# Patient Record
Sex: MALE | Race: WHITE | Employment: FULL TIME | URBAN - METROPOLITAN AREA
[De-identification: names, ages, dates, MRNs, and addresses within clinical notes are randomized per-mention and may not be internally consistent; named-entity substitution may affect disease eponyms.]

---

## 2017-03-12 DIAGNOSIS — E78.5 HYPERLIPIDEMIA LDL GOAL <130: ICD-10-CM

## 2017-03-14 RX ORDER — SIMVASTATIN 20 MG
TABLET ORAL
Qty: 90 TABLET | Refills: 0 | Status: SHIPPED | OUTPATIENT
Start: 2017-03-14 | End: 2017-06-11

## 2017-03-14 NOTE — TELEPHONE ENCOUNTER
Prescription approved per [facility] Refill Protocol.  Steph Ames RN

## 2017-03-14 NOTE — TELEPHONE ENCOUNTER
simvastatin (ZOCOR) 20 MG tablet       Last Written Prescription Date: 6/14/2016  Last Fill Quantity: 90, # refills: 2  Last Office Visit with G, P or [facility] prescribing provider: 4/29/2016       Lab Results   Component Value Date    CHOL 164 01/20/2014     Lab Results   Component Value Date    HDL 56 04/29/2016     Lab Results   Component Value Date    LDL 85 04/29/2016    LDL 93 01/20/2014     Lab Results   Component Value Date    TRIG 92 01/20/2014     Lab Results   Component Value Date    CHOLHDLRATIO 3.1 01/20/2014

## 2017-04-08 DIAGNOSIS — I10 HTN (HYPERTENSION): ICD-10-CM

## 2017-04-08 NOTE — LETTER
[facility]  [address]  [address]  [place]  [address]  Tel: [phone]    April 12, 2017    Primo MERVAT Willian  [address] [address]        Dear Mr. Dorsey,    Our records indicate that you are due for an office visit with .  Please call to schedule an appointment so that we can avoid interruption of your medical care and medication refills.  Your recent request for the medication, Losartan, has been filled for one month.  Further refills can be discussed at your next appointment.    Sincerely,    Steph PEREZ RN     [facility]/office of

## 2017-04-10 NOTE — TELEPHONE ENCOUNTER
losartan (COZAAR) 100 MG tablet        Last Written Prescription Date: 7/8/2016  Last Fill Quantity: 90, # refills: 2  Last Office Visit with G, P or [facility] prescribing provider: 4/29/2016       Potassium   Date Value Ref Range Status   04/29/2016 4.4 3.4 - 5.3 mmol/L Final     Creatinine   Date Value Ref Range Status   04/29/2016 0.89 0.66 - 1.25 mg/dL Final     BP Readings from Last 3 Encounters:   05/12/16 137/61   04/29/16 139/73   04/06/16 (!) 186/96

## 2017-04-12 RX ORDER — LOSARTAN POTASSIUM 100 MG/1
TABLET ORAL
Qty: 90 TABLET | Refills: 0 | Status: SHIPPED | OUTPATIENT
Start: 2017-04-12 | End: 2017-07-18

## 2017-04-12 NOTE — TELEPHONE ENCOUNTER
Left message on machine to call back to schedule.  Letter mailed to patient also.  Prescription approved per [facility] Refill Protocol X 1  Steph Ames RN

## 2017-06-11 DIAGNOSIS — E78.5 HYPERLIPIDEMIA LDL GOAL <130: ICD-10-CM

## 2017-06-12 NOTE — TELEPHONE ENCOUNTER
TC's Patient needs to est care with new PCP. Route to refill once complete      simvastatin (ZOCOR) 20 MG tablet       Last Written Prescription Date: 3/14/2017  Last Fill Quantity: 90, # refills: 0  Last Office Visit with [facility], [facility] or [facility] prescribing provider: 4/29/2016       Lab Results   Component Value Date    CHOL 164 01/20/2014     Lab Results   Component Value Date    HDL 56 04/29/2016     Lab Results   Component Value Date    LDL 85 04/29/2016    LDL 93 01/20/2014     Lab Results   Component Value Date    TRIG 92 01/20/2014     Lab Results   Component Value Date    CHOLHDLRATIO 3.1 01/20/2014

## 2017-06-14 RX ORDER — SIMVASTATIN 20 MG
TABLET ORAL
Qty: 90 TABLET | Refills: 0 | Status: SHIPPED | OUTPATIENT
Start: 2017-06-14 | End: 2018-01-16

## 2017-06-14 NOTE — TELEPHONE ENCOUNTER
Routing refill request to provider for review/approval because:  Labs not current:  FLP    PCP: Patient has appointment to establish care with Dr Maldonado.    Please review refill request and approve as appropriate    Thank you    Jesi Belle RN

## 2017-07-18 ENCOUNTER — OFFICE VISIT (OUTPATIENT)
Dept: FAMILY MEDICINE | Facility: CLINIC | Age: 77
End: 2017-07-18
Payer: COMMERCIAL

## 2017-07-18 VITALS
DIASTOLIC BLOOD PRESSURE: 70 MMHG | BODY MASS INDEX: 18.26 KG/M2 | HEART RATE: 67 BPM | WEIGHT: 134.8 LBS | TEMPERATURE: 97.6 F | HEIGHT: 72 IN | SYSTOLIC BLOOD PRESSURE: 136 MMHG | OXYGEN SATURATION: 98 %

## 2017-07-18 DIAGNOSIS — I10 ESSENTIAL HYPERTENSION: Primary | ICD-10-CM

## 2017-07-18 DIAGNOSIS — F17.200 TOBACCO USE DISORDER: ICD-10-CM

## 2017-07-18 DIAGNOSIS — E78.5 HYPERLIPIDEMIA LDL GOAL <130: ICD-10-CM

## 2017-07-18 DIAGNOSIS — M47.892 OTHER OSTEOARTHRITIS OF SPINE, CERVICAL REGION: ICD-10-CM

## 2017-07-18 DIAGNOSIS — J43.8 OTHER EMPHYSEMA (H): ICD-10-CM

## 2017-07-18 DIAGNOSIS — J30.2 CHRONIC SEASONAL ALLERGIC RHINITIS, UNSPECIFIED TRIGGER: ICD-10-CM

## 2017-07-18 PROCEDURE — 99214 OFFICE O/P EST MOD 30 MIN: CPT | Performed by: INTERNAL MEDICINE

## 2017-07-18 RX ORDER — SIMVASTATIN 20 MG
20 TABLET ORAL AT BEDTIME
Qty: 90 TABLET | Refills: 3 | Status: SHIPPED | OUTPATIENT
Start: 2017-07-18 | End: 2018-01-01

## 2017-07-18 RX ORDER — LOSARTAN POTASSIUM 100 MG/1
TABLET ORAL
Qty: 90 TABLET | Refills: 3 | Status: SHIPPED | OUTPATIENT
Start: 2017-07-18 | End: 2018-01-01

## 2017-07-18 RX ORDER — FLUTICASONE PROPIONATE 50 MCG
1-2 SPRAY, SUSPENSION (ML) NASAL DAILY
Qty: 16 G | Refills: 1 | COMMUNITY

## 2017-07-18 NOTE — MR AVS SNAPSHOT
After Visit Summary   7/18/2017    Primo Dorsey    MRN: [id number]           Patient Information     Date Of Birth          1940        Visit Information        Provider Department      7/18/2017 8:30 AM Ralph Maldonado MD [facility]        Today's Diagnoses     Essential hypertension    -  1    Other emphysema (H)        Hyperlipidemia LDL goal <130        Tobacco use disorder        Other osteoarthritis of spine, cervical region        Chronic seasonal allergic rhinitis, unspecified trigger           Follow-ups after your visit        Your next 10 appointments already scheduled     Aug 21, 2017  8:30 AM CDT   Office Visit with [facility]donado MD   [facility]    [address] [facility] [address] [phone]           Bring a current list of meds and any records pertaining to this visit.  For Physicals, please bring immunization records and any forms needing to be filled out.  Please arrive 10 minutes early to complete paperwork.              Who to contact     If you have questions or need follow up information about today's clinic visit or your schedule please contact [facility] directly at [phone].  Normal or non-critical lab and imaging results will be communicated to you by Front[employer], letter or phone within 4 business days after the clinic has received the results. If you do not hear from us within 7 days, please contact the clinic through [employer]en[employer] or phone. If you have a critical or abnormal lab result, we will notify you by phone as soon as possible.  Submit refill requests through [employer] or call your pharmacy and they will forward the refill request to us. Please allow 3 business days for your refill to be completed.          Additional Information About Your Visit        Frontenach[employer]oddler Information     [employer] lets you send messages to your doctor, view your test results, renew your prescriptions, schedule  "appointments and more. To sign up, go to [email or web address] . Click on \"Log in\" on the left side of the screen, which will take you to the Welcome page. Then click on \"Sign up Now\" on the right side of the page.     You will be asked to enter the access code listed below, as well as some personal information. Please follow the directions to create your username and password.     Your access code is: [email or web address]  Expires: 10/17/2017  6:01 AM     Your access code will  in 90 days. If you need help or a new code, please call your [facility] clinic or [phone].        Care EveryWhere ID     This is your Care EveryWhere ID. This could be used by other organizations to access your [facility] medical records  [id number]        Your Vitals Were     Pulse Temperature Height Pulse Oximetry BMI (Body Mass Index)       67 97.6  F (36.4  C) (Oral) 6' (1.829 m) 98% 18.28 kg/m2        Blood Pressure from Last 3 Encounters:   17 136/70   16 137/61   16 139/73    Weight from Last 3 Encounters:   17 134 lb 12.8 oz (61.1 kg)   16 136 lb 8 oz (61.9 kg)   16 139 lb 1 oz (63.1 kg)              Today, you had the following     No orders found for display         Today's Medication Changes          These changes are accurate as of: 17 11:59 PM.  If you have any questions, ask your nurse or doctor.               These medicines have changed or have updated prescriptions.        Dose/Directions    fluticasone-salmeterol 250-50 MCG/DOSE diskus inhaler   Commonly known as:  ADVAIR   This may have changed:  when to take this   Used for:  Other emphysema (H)   Changed by:  Ralph Maldonado MD        Dose:  1 puff   Inhale 1 puff into the lungs daily   Quantity:  1 Inhaler   Refills:  3       losartan 100 MG tablet   Commonly known as:  COZAAR   This may have changed:  See the new instructions.   Used for:  Essential hypertension   Changed by:  Ralph Maldonado MD        take 1 tablet by " mouth once daily.   Quantity:  90 tablet   Refills:  3       * simvastatin 20 MG tablet   Commonly known as:  ZOCOR   This may have changed:  Another medication with the same name was added. Make sure you understand how and when to take each.   Used for:  Hyperlipidemia LDL goal <130   Changed by:  Anton Brown MD        TAKE 1 TABLET BY MOUTH EVERY NIGHT AT BEDTIME.   Quantity:  90 tablet   Refills:  0       * simvastatin 20 MG tablet   Commonly known as:  ZOCOR   This may have changed:  You were already taking a medication with the same name, and this prescription was added. Make sure you understand how and when to take each.   Used for:  Hyperlipidemia LDL goal <130   Changed by:  Ralph Maldonado MD        Dose:  20 mg   Take 1 tablet (20 mg) by mouth At Bedtime   Quantity:  90 tablet   Refills:  3       * Notice:  This list has 2 medication(s) that are the same as other medications prescribed for you. Read the directions carefully, and ask your doctor or other care provider to review them with you.      Stop taking these medicines if you haven't already. Please contact your care team if you have questions.     ibuprofen 600 MG tablet   Commonly known as:  ADVIL/MOTRIN   Stopped by:  Ralph Maldonado MD           MULTI-DAY Tabs   Stopped by:  Ralph Maldonado MD           oxyCODONE-acetaminophen 5-325 MG per tablet   Commonly known as:  PERCOCET   Stopped by:  Ralph Maldonado MD           senna-docusate 8.6-50 MG per tablet   Commonly known as:  SENOKOT-S;PERICOLACE   Stopped by:  Ralph Maldonado MD                Where to get your medicines      These medications were sent to [facility][id number] - [place], [place] - [address]   [address] [facility] [address]     Phone:  [phone]     losartan 100 MG tablet    simvastatin 20 MG tablet                Primary Care Provider Office Phone # Fax #    Ralph Maldonado [phone]       [facility]  [address]  [facility] [address]        Equal Access to Services     JACKSON CARRILLO : Hadii michelle ku hadconyo Sosharonali, waaxda luqadaha, qaybta kaalmada malickvanessa, jose monreal carlos albertojack teresalucia urena herbie couch. So [facility] [phone].    ATENCIÓN: Si habla español, tiene a dee disposición servicios gratuitos de asistencia lingüística. Llame al [phone].    We comply with applicable federal civil rights laws and [place] laws. We do not discriminate on the basis of race, color, national origin, age, disability sex, sexual orientation or gender identity.            Thank you!     Thank you for choosing [facility]  for your care. Our goal is always to provide you with excellent care. Hearing back from our patients is one way we can continue to improve our services. Please take a few minutes to complete the written survey that you may receive in the mail after your visit with us. Thank you!             Your Updated Medication List - Protect others around you: Learn how to safely use, store and throw away your medicines at www.disposemymeds.org.          This list is accurate as of: 7/18/17 11:59 PM.  Always use your most recent med list.                   Brand Name Dispense Instructions for use Diagnosis    aspirin 81 MG tablet      1 tab po QD (Once per day)        FLONASE 50 MCG/ACT spray   Generic drug:  fluticasone     16 g    Spray 1-2 sprays into both nostrils daily        fluticasone-salmeterol 250-50 MCG/DOSE diskus inhaler    ADVAIR    1 Inhaler    Inhale 1 puff into the lungs daily    Other emphysema (H)       levalbuterol 45 MCG/ACT Inhaler    XOPENEX HFA    1 Inhaler    Inhale 2 puffs into the lungs every 6 hours as needed for shortness of breath / dyspnea or wheezing    COPD exacerbation (H)       losartan 100 MG tablet    COZAAR    90 tablet    take 1 tablet by mouth once daily.    Essential hypertension       * simvastatin 20 MG tablet    ZOCOR    90 tablet    TAKE 1 TABLET BY MOUTH  EVERY NIGHT AT BEDTIME.    Hyperlipidemia LDL goal <130       * simvastatin 20 MG tablet    ZOCOR    90 tablet    Take 1 tablet (20 mg) by mouth At Bedtime    Hyperlipidemia LDL goal <130       * Notice:  This list has 2 medication(s) that are the same as other medications prescribed for you. Read the directions carefully, and ask your doctor or other care provider to review them with you.

## 2017-07-18 NOTE — NURSING NOTE
Chief Complaint   Patient presents with     Hypertension     Lipids       Initial /77 (BP Location: Right arm, Patient Position: Chair, Cuff Size: Adult Regular)  Pulse 67  Temp 97.6  F (36.4  C) (Oral)  Ht 6' (1.829 m)  Wt 134 lb 12.8 oz (61.1 kg)  SpO2 98%  BMI 18.28 kg/m2 Estimated body mass index is 18.28 kg/(m^2) as calculated from the following:    Height as of this encounter: 6' (1.829 m).    Weight as of this encounter: 134 lb 12.8 oz (61.1 kg).  Medication Reconciliation: complete.  Isabel Blancas, CMA

## 2017-07-18 NOTE — PROGRESS NOTES
SUBJECTIVE:                                                    Prmio Dorsey is a 77 year old male who was a previous Dr. Brown patient, presents to clinic today for the following health issues:    He notes hearing loss for which he has hearing aids though he is reluctant to wear them due to aggravation of congestion.    Patient has emphysema and has been using Advair 1 puff once a day. He notes he recently started one month ago and has noticed improvement of breathing. Denies wheezing. Patient is able to walk at least 1.5 blocks without exertional dyspnea. He does not walk routinely though he does walk at work at a [employer]. Patient notes he has not used rescue inhaler in 4-6 weeks.    Patient has seasonal allergic rhinitis and notes he does not use fluticasone though he uses a nasal sprayer, Clear, a homeopathic nasal spray and a saline solution at night. He opts to keep Flonase on hand for increased symptoms.     Hyperlipidemia Follow-Up      Rate your low fat/cholesterol diet?: not monitoring fat    Taking statin?  Yes, no muscle aches from statin. Tolerated well.    Other lipid medications/supplements?:  none    Hypertension Follow-up      Outpatient blood pressures are not being checked.    Low Salt Diet: watches sodium      Amount of exercise or physical activity: still works, on his feet a lot    Problems taking medications regularly: No    Medication side effects: none    Diet: low salt    Problem list and histories reviewed & adjusted, as indicated.  Additional history: as documented    Current Outpatient Prescriptions   Medication Sig Dispense Refill     fluticasone-salmeterol (ADVAIR) 250-50 MCG/DOSE diskus inhaler Inhale 1 puff into the lungs daily 1 Inhaler 3     fluticasone (FLONASE) 50 MCG/ACT spray Spray 1-2 sprays into both nostrils daily 16 g 1     losartan (COZAAR) 100 MG tablet take 1 tablet by mouth once daily. 90 tablet 3     simvastatin (ZOCOR) 20 MG tablet Take 1 tablet (20  mg) by mouth At Bedtime 90 tablet 3     simvastatin (ZOCOR) 20 MG tablet TAKE 1 TABLET BY MOUTH EVERY NIGHT AT BEDTIME. 90 tablet 0     levalbuterol (XOPENEX HFA) 45 MCG/ACT inhaler Inhale 2 puffs into the lungs every 6 hours as needed for shortness of breath / dyspnea or wheezing 1 Inhaler 3     ASPIRIN 81 MG OR TABS 1 tab po QD (Once per day)       [DISCONTINUED] losartan (COZAAR) 100 MG tablet take 1 tablet by mouth once daily. 90 tablet 0     [DISCONTINUED] fluticasone-salmeterol (ADVAIR) 250-50 MCG/DOSE diskus inhaler Inhale 1 puff into the lungs 2 times daily 1 Inhaler 3     Allergies   Allergen Reactions     No Known Drug Allergies      Reviewed and updated as needed this visit by clinical staff  Tobacco  Meds  Soc Hx      Reviewed and updated as needed this visit by Provider       ROS:  Constitutional, HEENT, cardiovascular, pulmonary, gi and gu systems are negative, except as otherwise noted.    This document serves as a record of the services and decisions personally performed and made by Ralph Maldonado MD. It was created on his/her behalf by Breana Morgan, a trained medical scribe. The creation of this document is based the provider's statements to the medical scribe.  Scribfatou Morgan 8:46 AM, July 18, 2017     OBJECTIVE:   /70  Pulse 67  Temp 97.6  F (36.4  C) (Oral)  Ht 1.829 m (6')  Wt 61.1 kg (134 lb 12.8 oz)  SpO2 98%  BMI 18.28 kg/m2  Body mass index is 18.28 kg/(m^2).  Neck was supple without adenopathy or thyromegaly his carotids were normal without bruits  Chest clear to auscultation and percussion. Decreased breath sounds bilaterally  Cardiovascular S1 and S2 are physiologic without murmurs or gallops  Abdomen bowel sounds were normal.  There is no palpable mass or organomegaly  Extremities nontender without any edema  Skin without significant abnormality     ASSESSMENT/PLAN:   1. Essential hypertension  - losartan (COZAAR) 100 MG tablet; take 1 tablet by mouth once  daily.  Dispense: 90 tablet; Refill: 3    2. Other emphysema (H)  Patient has previously used Advair QD. Begin Advair BID.  - fluticasone-salmeterol (ADVAIR) 250-50 MCG/DOSE diskus inhaler; Inhale 1 puff into the lungs daily  Dispense: 1 Inhaler; Refill: 3    3. Hyperlipidemia LDL goal <130  - simvastatin (ZOCOR) 20 MG tablet; Take 1 tablet (20 mg) by mouth At Bedtime  Dispense: 90 tablet; Refill: 3    4. Tobacco use disorder    5. Other osteoarthritis of spine, cervical region    6.Chronic Seasonal allergic rhinitis,unspecified trigger  - fluticasone (FLONASE) 50 MCG/ACT spray; Spray 1-2 sprays into both nostrils daily  Dispense: 16 g; Refill: 1    Except otherwise noted as above, all conditions are stable and medications are tolerated well. Continue related medications unchanged.    Patient was instructed to monitor his activity by using a pedometer.     Follow up for breathing, annual physical and associated labs in one month.    Ralph Maldonado MD  [facility]    The information in this document, created by the medical scribe for me, accurately reflects the services I personally performed and the decisions made by me. I have reviewed and approved this document for accuracy prior to leaving the patient care area.  Ralph Maldonado MD  8:35 AM, 07/18/17

## 2017-07-24 DIAGNOSIS — J43.8 OTHER EMPHYSEMA (H): ICD-10-CM

## 2017-07-25 NOTE — TELEPHONE ENCOUNTER
fluticasone-salmeterol (ADVAIR) 250-50 MCG/DOSE diskus inhaler         Medication listed as historical    Last Written Prescription Date: -  Last Fill Quantity: 1, # refills: 3    Last Office Visit with G, P or [facility] prescribing provider:  7/18/17   Future Office Visit:    Next 5 appointments (look out 90 days)     Aug 21, 2017  8:30 AM CDT   [facility] Visit with Ralph Maldonado MD   [facility]    [address] [address]   [phone]                   Date of Last Asthma Action Plan Letter:   There are no preventive care reminders to display for this patient.   Asthma Control Test: No flowsheet data found.    Date of Last Spirometry Test:   No results found for this or any previous visit.          Cathi Avalos RT(R)

## 2017-07-26 NOTE — TELEPHONE ENCOUNTER
Routing refill request to provider for review/approval because:  1) Rx listed as historical  2) No spirometry test in patient chart  3) Per office visit notes 7/18/17 - patient to increase Advair to BID (was taking QD)     pended for BID dosing.   Yareli HOLLOWAY RN

## 2017-08-18 NOTE — PROGRESS NOTES
"  SUBJECTIVE:   Primo Dorsey is a 77 year old male who presents to clinic today for the following health issues:  {Provider please address medication reconciliation discrepancies--rooming staff please delete if no med/rec issues}    {Superlists:587224}    {additional problems for provider to add:117326}    Problem list and histories reviewed & adjusted, as indicated.  Additional history: {NONE - AS DOCUMENTED:199945::\"as documented\"}    {HIST REVIEW/ LINKS 2:319362}    Reviewed and updated as needed this visit by clinical staff       Reviewed and updated as needed this visit by Provider         {PROVIDER CHARTING PREFERENCE:808714}    "

## 2017-08-21 ENCOUNTER — OFFICE VISIT (OUTPATIENT)
Dept: FAMILY MEDICINE | Facility: CLINIC | Age: 77
End: 2017-08-21
Payer: COMMERCIAL

## 2017-08-21 VITALS
OXYGEN SATURATION: 97 % | HEIGHT: 72 IN | HEART RATE: 69 BPM | SYSTOLIC BLOOD PRESSURE: 155 MMHG | BODY MASS INDEX: 18.28 KG/M2 | WEIGHT: 135 LBS | DIASTOLIC BLOOD PRESSURE: 75 MMHG | TEMPERATURE: 97.9 F

## 2017-08-21 DIAGNOSIS — M47.892 OTHER OSTEOARTHRITIS OF SPINE, CERVICAL REGION: ICD-10-CM

## 2017-08-21 DIAGNOSIS — Z00.00 ENCOUNTER FOR ROUTINE ADULT HEALTH EXAMINATION WITHOUT ABNORMAL FINDINGS: ICD-10-CM

## 2017-08-21 DIAGNOSIS — Z12.5 SCREENING FOR PROSTATE CANCER: ICD-10-CM

## 2017-08-21 DIAGNOSIS — E78.5 HYPERLIPIDEMIA LDL GOAL <130: ICD-10-CM

## 2017-08-21 DIAGNOSIS — G62.89 OTHER POLYNEUROPATHY: ICD-10-CM

## 2017-08-21 DIAGNOSIS — F17.200 TOBACCO USE DISORDER: ICD-10-CM

## 2017-08-21 DIAGNOSIS — I10 ESSENTIAL HYPERTENSION: Primary | ICD-10-CM

## 2017-08-21 DIAGNOSIS — R53.83 FATIGUE, UNSPECIFIED TYPE: ICD-10-CM

## 2017-08-21 DIAGNOSIS — R73.9 HYPERGLYCEMIA: ICD-10-CM

## 2017-08-21 DIAGNOSIS — J43.8 OTHER EMPHYSEMA (H): ICD-10-CM

## 2017-08-21 LAB
ALBUMIN SERPL-MCNC: 4.3 G/DL (ref 3.4–5)
ALBUMIN UR-MCNC: NEGATIVE MG/DL
ALP SERPL-CCNC: 71 U/L (ref 40–150)
ALT SERPL W P-5'-P-CCNC: 27 U/L (ref 0–70)
ANION GAP SERPL CALCULATED.3IONS-SCNC: 8 MMOL/L (ref 3–14)
APPEARANCE UR: CLEAR
AST SERPL W P-5'-P-CCNC: 39 U/L (ref 0–45)
BILIRUB SERPL-MCNC: 0.6 MG/DL (ref 0.2–1.3)
BILIRUB UR QL STRIP: NEGATIVE
BUN SERPL-MCNC: 21 MG/DL (ref 7–30)
CALCIUM SERPL-MCNC: 9.2 MG/DL (ref 8.5–10.1)
CHLORIDE SERPL-SCNC: 106 MMOL/L (ref 94–109)
CHOLEST SERPL-MCNC: 167 MG/DL
CO2 SERPL-SCNC: 26 MMOL/L (ref 20–32)
COLOR UR AUTO: YELLOW
CREAT SERPL-MCNC: 0.86 MG/DL (ref 0.66–1.25)
ERYTHROCYTE [DISTWIDTH] IN BLOOD BY AUTOMATED COUNT: 13.6 % (ref 10–15)
FEF 25/75: NORMAL
FEV-1: NORMAL
FEV1/FVC: NORMAL
FVC: NORMAL
GFR SERPL CREATININE-BSD FRML MDRD: 87 ML/MIN/1.7M2
GLUCOSE SERPL-MCNC: 87 MG/DL (ref 70–99)
GLUCOSE UR STRIP-MCNC: NEGATIVE MG/DL
HBA1C MFR BLD: 5.5 % (ref 4.3–6)
HCT VFR BLD AUTO: 42.5 % (ref 40–53)
HDLC SERPL-MCNC: 74 MG/DL
HGB BLD-MCNC: 13.9 G/DL (ref 13.3–17.7)
HGB UR QL STRIP: ABNORMAL
KETONES UR STRIP-MCNC: NEGATIVE MG/DL
LDLC SERPL CALC-MCNC: 82 MG/DL
LEUKOCYTE ESTERASE UR QL STRIP: NEGATIVE
MCH RBC QN AUTO: 30.5 PG (ref 26.5–33)
MCHC RBC AUTO-ENTMCNC: 32.7 G/DL (ref 31.5–36.5)
MCV RBC AUTO: 93 FL (ref 78–100)
NITRATE UR QL: NEGATIVE
NONHDLC SERPL-MCNC: 93 MG/DL
PH UR STRIP: 7 PH (ref 5–7)
PLATELET # BLD AUTO: 212 10E9/L (ref 150–450)
POTASSIUM SERPL-SCNC: 4.2 MMOL/L (ref 3.4–5.3)
PROT SERPL-MCNC: 7.9 G/DL (ref 6.8–8.8)
PSA SERPL-ACNC: 1.75 UG/L (ref 0–4)
RBC # BLD AUTO: 4.56 10E12/L (ref 4.4–5.9)
RBC #/AREA URNS AUTO: NORMAL /HPF
SODIUM SERPL-SCNC: 140 MMOL/L (ref 133–144)
SOURCE: ABNORMAL
SP GR UR STRIP: 1.01 (ref 1–1.03)
TRIGL SERPL-MCNC: 53 MG/DL
TSH SERPL DL<=0.005 MIU/L-ACNC: 1.6 MU/L (ref 0.4–4)
UROBILINOGEN UR STRIP-ACNC: 0.2 EU/DL (ref 0.2–1)
VIT B12 SERPL-MCNC: 545 PG/ML (ref 193–986)
WBC # BLD AUTO: 6.5 10E9/L (ref 4–11)
WBC #/AREA URNS AUTO: NORMAL /HPF

## 2017-08-21 PROCEDURE — 36415 COLL VENOUS BLD VENIPUNCTURE: CPT | Performed by: INTERNAL MEDICINE

## 2017-08-21 PROCEDURE — 85027 COMPLETE CBC AUTOMATED: CPT | Performed by: INTERNAL MEDICINE

## 2017-08-21 PROCEDURE — 81001 URINALYSIS AUTO W/SCOPE: CPT | Performed by: INTERNAL MEDICINE

## 2017-08-21 PROCEDURE — 94010 BREATHING CAPACITY TEST: CPT | Performed by: INTERNAL MEDICINE

## 2017-08-21 PROCEDURE — G0103 PSA SCREENING: HCPCS | Performed by: INTERNAL MEDICINE

## 2017-08-21 PROCEDURE — G0439 PPPS, SUBSEQ VISIT: HCPCS | Performed by: INTERNAL MEDICINE

## 2017-08-21 PROCEDURE — 82607 VITAMIN B-12: CPT | Performed by: INTERNAL MEDICINE

## 2017-08-21 PROCEDURE — 84443 ASSAY THYROID STIM HORMONE: CPT | Performed by: INTERNAL MEDICINE

## 2017-08-21 PROCEDURE — 80053 COMPREHEN METABOLIC PANEL: CPT | Performed by: INTERNAL MEDICINE

## 2017-08-21 PROCEDURE — 80061 LIPID PANEL: CPT | Performed by: INTERNAL MEDICINE

## 2017-08-21 PROCEDURE — 83036 HEMOGLOBIN GLYCOSYLATED A1C: CPT | Performed by: INTERNAL MEDICINE

## 2017-08-21 NOTE — MR AVS SNAPSHOT
After Visit Summary   8/21/2017    Primo Dorsey    MRN: [id number]           Patient Information     Date Of Birth          1940        Visit Information        Provider Department      8/21/2017 8:30 AM Ralph Maldonado MD [facility]        Today's Diagnoses     Essential hypertension    -  1    Hyperlipidemia LDL goal <130        Other polyneuropathy (H)        Other emphysema (H)        Tobacco use disorder        Other osteoarthritis of spine, cervical region        Encounter for routine adult health examination without abnormal findings        Screening for prostate cancer        Fatigue, unspecified type        Hyperglycemia          Care Instructions      Preventive Health Recommendations:       Male Ages 65 and over    Yearly exam:             See your health care provider every year in order to  o   Review health changes.   o   Discuss preventive care.    o   Review your medicines if your doctor has prescribed any.    Talk with your health care provider about whether you should have a test to screen for prostate cancer (PSA).    Every 3 years, have a diabetes test (fasting glucose). If you are at risk for diabetes, you should have this test more often.    Every 5 years, have a cholesterol test. Have this test more often if you are at risk for high cholesterol or heart disease.     Every 10 years, have a colonoscopy. Or, have a yearly FIT test (stool test). These exams will check for colon cancer.    Talk to with your health care provider about screening for Abdominal Aortic Aneurysm if you have a family history of AAA or have a history of smoking.  Shots:     Get a flu shot each year.     Get a tetanus shot every 10 years.     Talk to your doctor about your pneumonia vaccines. There are now two you should receive - Pneumovax (PPSV 23) and Prevnar (PCV 13).    Talk to your doctor about a shingles vaccine.     Talk to your doctor about the hepatitis B vaccine.  Nutrition:  "    Eat at least 5 servings of fruits and vegetables each day.     Eat whole-grain bread, whole-wheat pasta and brown rice instead of white grains and rice.     Talk to your doctor about Calcium and Vitamin D.   Lifestyle    Exercise for at least 150 minutes a week (30 minutes a day, 5 days a week). This will help you control your weight and prevent disease.     Limit alcohol to one drink per day.     No smoking.     Wear sunscreen to prevent skin cancer.     See your dentist every six months for an exam and cleaning.     See your eye doctor every 1 to 2 years to screen for conditions such as glaucoma, macular degeneration and cataracts.          Follow-ups after your visit        Who to contact     If you have questions or need follow up information about today's clinic visit or your schedule please contact [facility] directly at [phone].  Normal or non-critical lab and imaging results will be communicated to you by RunRevhart, letter or phone within 4 business days after the clinic has received the results. If you do not hear from us within 7 days, please contact the clinic through RunRevhart or phone. If you have a critical or abnormal lab result, we will notify you by phone as soon as possible.  Submit refill requests through Contraqer or call your pharmacy and they will forward the refill request to us. Please allow 3 business days for your refill to be completed.          Additional Information About Your Visit        Contraqer Information     Contraqer lets you send messages to your doctor, view your test results, renew your prescriptions, schedule appointments and more. To sign up, go to [email or web address] . Click on \"Log in\" on the left side of the screen, which will take you to the Welcome page. Then click on \"Sign up Now\" on the right side of the page.     You will be asked to enter the access code listed below, as well as some personal information. Please follow the directions to create your " username and password.     Your access code is: [email or web address]  Expires: 10/17/2017  6:01 AM     Your access code will  in 90 days. If you need help or a new code, please call your [facility] clinic or [phone].        Care EveryWhere ID     This is your Care EveryWhere ID. This could be used by other organizations to access your [facility] medical records  [id number]        Your Vitals Were     Pulse Temperature Height Pulse Oximetry BMI (Body Mass Index)       69 97.9  F (36.6  C) (Oral) 6' (1.829 m) 97% 18.31 kg/m2        Blood Pressure from Last 3 Encounters:   17 155/75   17 136/70   16 137/61    Weight from Last 3 Encounters:   17 135 lb (61.2 kg)   17 134 lb 12.8 oz (61.1 kg)   16 136 lb 8 oz (61.9 kg)              We Performed the Following     CBC with platelets     Comprehensive metabolic panel     Hemoglobin A1c     Lipid panel reflex to direct LDL     Prostate spec antigen screen     Spirometry, Breathing Capacity: Normal Order, Clinic Performed     TSH     UA reflex to Microscopic and Culture     Urine Microscopic     Vitamin B12          Today's Medication Changes          These changes are accurate as of: 17 11:59 PM.  If you have any questions, ask your nurse or doctor.               Start taking these medicines.        Dose/Directions    order for DME   Used for:  Other emphysema (H)   Started by:  Ralph Maldonado MD        Dog walking three times weekly   Quantity:  1 unit marking on an U-100 insulin syringe   Refills:  11            Where to get your medicines      Some of these will need a paper prescription and others can be bought over the counter.  Ask your nurse if you have questions.     Bring a paper prescription for each of these medications     order for DME                Primary Care Provider Office Phone # Fax #    Ralph Maldonado [phone] [address] [facility] [address]  [facility] [address]        Equal Access to  Services     [facility]: Hadii aad ku hadconybennett Sanadov, waaxda luqadaha, qaybta kaalmada erica, jose stoner . So [facility] [phone].    ATENCIÓN: Si habla dexter, tiene a dee disposición servicios gratuitos de asistencia lingüística. Llame al [phone].    We comply with applicable federal civil rights laws and [place] laws. We do not discriminate on the basis of race, color, national origin, age, disability sex, sexual orientation or gender identity.            Thank you!     Thank you for choosing [facility]  for your care. Our goal is always to provide you with excellent care. Hearing back from our patients is one way we can continue to improve our services. Please take a few minutes to complete the written survey that you may receive in the mail after your visit with us. Thank you!             Your Updated Medication List - Protect others around you: Learn how to safely use, store and throw away your medicines at www.disposemymeds.org.          This list is accurate as of: 8/21/17 11:59 PM.  Always use your most recent med list.                   Brand Name Dispense Instructions for use Diagnosis    aspirin 81 MG tablet      1 tab po QD (Once per day)        FLONASE 50 MCG/ACT spray   Generic drug:  fluticasone     16 g    Spray 1-2 sprays into both nostrils daily        fluticasone-salmeterol 250-50 MCG/DOSE diskus inhaler    ADVAIR    3 Inhaler    Inhale 1 puff into the lungs 2 times daily    Other emphysema (H)       levalbuterol 45 MCG/ACT Inhaler    XOPENEX HFA    1 Inhaler    Inhale 2 puffs into the lungs every 6 hours as needed for shortness of breath / dyspnea or wheezing    COPD exacerbation (H)       losartan 100 MG tablet    COZAAR    90 tablet    take 1 tablet by mouth once daily.    Essential hypertension       order for DME     1 unit marking on an U-100 insulin syringe    Dog walking three times weekly    Other emphysema (H)       PRO-BIOTIC  BLEND PO           * simvastatin 20 MG tablet    ZOCOR    90 tablet    TAKE 1 TABLET BY MOUTH EVERY NIGHT AT BEDTIME.    Hyperlipidemia LDL goal <130       * simvastatin 20 MG tablet    ZOCOR    90 tablet    Take 1 tablet (20 mg) by mouth At Bedtime    Hyperlipidemia LDL goal <130       * Notice:  This list has 2 medication(s) that are the same as other medications prescribed for you. Read the directions carefully, and ask your doctor or other care provider to review them with you.

## 2017-08-21 NOTE — PROGRESS NOTES
SUBJECTIVE:   Primo Dorsey is a 77 year old male who presents for Preventive Visit.    155/75  Are you in the first 12 months of your Medicare Part B coverage?  No    Healthy Habits:    Do you get at least three servings of calcium containing foods daily (dairy, green leafy vegetables, etc.)? yes    Amount of exercise or daily activities, outside of work: 5 day(s) per week    Problems taking medications regularly No    Medication side effects: No    Have you had an eye exam in the past two years? yes    Do you see a dentist twice per year? yes    Do you have sleep apnea, excessive snoring or daytime drowsiness?no    COGNITIVE SCREEN  1) Repeat 3 items (Banana, Sunrise, Chair)    2) Clock draw:   3) 3 item recall:   Results: 3 items recalled: COGNITIVE IMPAIRMENT LESS LIKELY    Mini-CogTM Copyright S Iva. Licensed by the author for use in [facility]; reprinted with permission (eligio@[facility]). All rights reserved.              {additional problems to add (Optional):084287}    Reviewed and updated as needed this visit by clinical staff         Reviewed and updated as needed this visit by Provider      Social History   Substance Use Topics     Smoking status: Current Every Day Smoker     Packs/day: 0.75     Years: 45.00     Types: Cigarettes     Smokeless tobacco: Never Used      Comment: 1 pack will last 2-3 days     Alcohol use No      Comment: not for 10 years       The patient does not drink >3 drinks per day nor >7 drinks per week.    Today's PHQ-2 Score:   PHQ-2 ( 1999 Pfizer) 11/2/2015 10/6/2014   Q1: Little interest or pleasure in doing things 0 0   Q2: Feeling down, depressed or hopeless 0 0   PHQ-2 Score 0 0         Do you feel safe in your environment - Yes    Do you have a Health Care Directive?: Yes: Patient states has Advance Directive and will bring in a copy to clinic.    Current providers sharing in care for this patient include: Patient Care Team:  Ralph Maldonado MD as PCP -  General (Internal Medicine)      Hearing impairment: Yes,     Ability to successfully perform activities of daily living: Yes, no assistance needed     Fall risk:         Home safety:  none identified      The following health maintenance items are reviewed in Epic and correct as of today:Health Maintenance   Topic Date Due     COPD ACTION PLAN Q1 YR  1940     AORTIC ANEURYSM SCREENING (SYSTEM ASSIGNED)  2007     FALL RISK ASSESSMENT  2016     INFLUENZA VACCINE (SYSTEM ASSIGNED)  2017     LIPID SCREEN Q5 YR MALE (SYSTEM ASSIGNED)  2021     ADVANCE DIRECTIVE PLANNING Q5 YRS  2021     TETANUS IMMUNIZATION (SYSTEM ASSIGNED)  2023     SPIROMETRY ONETIME  Completed     PNEUMOCOCCAL  Completed     {Chronicprobdata (Optional):512825}    {Decision Support (Optional):037674}    ROS:  {ROS COMP:758777}    OBJECTIVE:   There were no vitals taken for this visit. Estimated body mass index is 18.28 kg/(m^2) as calculated from the following:    Height as of 17: 6' (1.829 m).    Weight as of 17: 134 lb 12.8 oz (61.1 kg).  EXAM:   {Exam :197825}    ASSESSMENT / PLAN:   {Diag Picklist:621985}    End of Life Planning:  Patient currently has an advanced directive: { :833473}    COUNSELING:  {Medicare Counselin}    {BP Counseling- Complete if BP >= 120/80  (Optional):414626}    Estimated body mass index is 18.28 kg/(m^2) as calculated from the following:    Height as of 17: 6' (1.829 m).    Weight as of 17: 134 lb 12.8 oz (61.1 kg).  {Weight Management Plan -- Complete if patient has an abnormal BMI (Optional):381993}   reports that he has been smoking Cigarettes.  He has a 33.75 pack-year smoking history. He has never used smokeless tobacco.  {Tobacco Cessation -- Complete if patient is a smoker (Optional):222658}    Appropriate preventive services were discussed with this patient, including applicable screening as appropriate for cardiovascular disease, diabetes,  osteopenia/osteoporosis, and glaucoma.  As appropriate for age/gender, discussed screening for colorectal cancer, prostate cancer, breast cancer, and cervical cancer. Checklist reviewing preventive services available has been given to the patient.    Reviewed patients plan of care and provided an AVS. The {CarePlan:514439} for Primo meets the Care Plan requirement. This Care Plan has been established and reviewed with the {PATIENT, FAMILY MEMBER, CAREGIVER:801969}.    Counseling Resources:  ATP IV Guidelines  Pooled Cohorts Equation Calculator  Breast Cancer Risk Calculator  FRAX Risk Assessment  ICSI Preventive Guidelines  Dietary Guidelines for Americans, 2010  USDA's MyPlate  ASA Prophylaxis  Lung CA Screening    Ralph Maldonado MD  [facility]

## 2017-08-21 NOTE — NURSING NOTE
Chief Complaint   Patient presents with     Medicare Visit       Initial /75  Pulse 69  Temp 97.9  F (36.6  C) (Oral)  Ht 6' (1.829 m)  Wt 135 lb (61.2 kg)  SpO2 97%  BMI 18.31 kg/m2 Estimated body mass index is 18.31 kg/(m^2) as calculated from the following:    Height as of this encounter: 6' (1.829 m).    Weight as of this encounter: 135 lb (61.2 kg).  Medication Reconciliation: complete   Neida HOLLOWAY CMA

## 2017-08-21 NOTE — PROGRESS NOTES
SUBJECTIVE:   Primo Dorsey is a 77 year old male who presents for Preventive Visit.    Are you in the first 12 months of your Medicare Part B coverage?  No    Healthy Habits:    Do you get at least three servings of calcium containing foods daily (dairy, green leafy vegetables, etc.)? yes    Amount of exercise or daily activities, outside of work: 5 day(s) per week    Problems taking medications regularly No    Medication side effects: No    Have you had an eye exam in the past two years? yes    Do you see a dentist twice per year? yes    Do you have sleep apnea, excessive snoring or daytime drowsiness?no    COGNITIVE SCREEN  1) Repeat 3 items (Banana, Sunrise, Chair)    2) Clock draw:   3) 3 item recall:   Results: 3 items recalled: COGNITIVE IMPAIRMENT LESS LIKELY    Mini-CogTM Copyright S [employer]va. Licensed by the author for use in [facility] [employer]; reprinted with permission (eligio@[facility]). All rights reserved.      Patient presents to the clinic for a preventive health visit. He reports his breathing is going well. Symptoms are well managed with Advair 1 puff BID. He rarely has to use his rescue inhaler. He states he can walk up to two blocks without exertional dyspnea. He is currently walking 5100 steps a day. Patient was advised to add 30 minutes of walking daily to his routine. Patient started taking a probiotic a month ago, he states that his bowels were inconsistent and weren't regular, notes an improvement in his bowel habits. He denies any headaches, dyspnea, angina, palpitations, heartburn, diarrhea, constipation, hematochezia, urinary difficulties, and skin changes. Nocturia 2-3x.    Patient complains of peripheral neuropathy. He describes a warm, burning sensation in the bottoms of his feet bilaterally but left greater than right. He also complains of occasional leg cramps. He states the leg cramps usually occur at the end of the week. Patient uses a compression stocking to resolve his  symptoms. He has degenerative joint disease and has neck pain and stiffness that radiates into his head, but denies any paraesthesias in his arms.     Current everyday smoker. He is smoking 6 cigarettes a day. Patient has no plans of cigarette cessation at this time.    Reviewed and updated as needed this visit by clinical staff  Tobacco  Allergies  Meds  Problems  Med Hx  Surg Hx  Fam Hx  Soc Hx        Reviewed and updated as needed this visit by Provider      Social History   Substance Use Topics     Smoking status: Current Every Day Smoker     Packs/day: 0.75     Years: 45.00     Types: Cigarettes     Smokeless tobacco: Never Used      Comment: 1 pack will last 2-3 days     Alcohol use No      Comment: not for 10 years       The patient does not drink >3 drinks per day nor >7 drinks per week.    Today's PHQ-2 Score:   PHQ-2 ( 1999 [employer]) 11/2/2015 10/6/2014   Q1: Little interest or pleasure in doing things 0 0   Q2: Feeling down, depressed or hopeless 0 0   PHQ-2 Score 0 0       Do you feel safe in your environment - Yes    Do you have a Health Care Directive?: Yes: Patient states has Advance Directive and will bring in a copy to clinic.    Current providers sharing in care for this patient include: Patient Care Team:  Ralph Maldonado MD as PCP - General (Internal Medicine)      Hearing impairment: Yes,     Ability to successfully perform activities of daily living: Yes, no assistance needed     Fall risk:       Home safety:  none identified      The following health maintenance items are reviewed in Epic and correct as of today:Health Maintenance   Topic Date Due     COPD ACTION PLAN Q1 YR  1940     AORTIC ANEURYSM SCREENING (SYSTEM ASSIGNED)  05/13/2007     FALL RISK ASSESSMENT  11/02/2016     INFLUENZA VACCINE (SYSTEM ASSIGNED)  09/01/2017     LIPID SCREEN Q5 YR MALE (SYSTEM ASSIGNED)  04/29/2021     ADVANCE DIRECTIVE PLANNING Q5 YRS  08/03/2021     TETANUS IMMUNIZATION (SYSTEM ASSIGNED)   06/20/2023     SPIROMETRY ONETIME  Completed     PNEUMOCOCCAL  Completed     Current Outpatient Prescriptions   Medication Sig Dispense Refill     Probiotic Product (PRO-BIOTIC BLEND PO)        fluticasone-salmeterol (ADVAIR) 250-50 MCG/DOSE diskus inhaler Inhale 1 puff into the lungs 2 times daily 3 Inhaler 3     fluticasone (FLONASE) 50 MCG/ACT spray Spray 1-2 sprays into both nostrils daily 16 g 1     losartan (COZAAR) 100 MG tablet take 1 tablet by mouth once daily. 90 tablet 3     simvastatin (ZOCOR) 20 MG tablet Take 1 tablet (20 mg) by mouth At Bedtime 90 tablet 3     simvastatin (ZOCOR) 20 MG tablet TAKE 1 TABLET BY MOUTH EVERY NIGHT AT BEDTIME. 90 tablet 0     levalbuterol (XOPENEX HFA) 45 MCG/ACT inhaler Inhale 2 puffs into the lungs every 6 hours as needed for shortness of breath / dyspnea or wheezing 1 Inhaler 3     ASPIRIN 81 MG OR TABS 1 tab po QD (Once per day)       Allergies   Allergen Reactions     No Known Drug Allergies        ROS:  C: NEGATIVE for fever, chills, change in weight  I: NEGATIVE for worrisome rashes, moles or lesions  E: NEGATIVE for vision changes or irritation  E/M: NEGATIVE for ear, mouth and throat problems  R: NEGATIVE for significant cough or SOB  B: NEGATIVE for masses, tenderness or discharge  CV: NEGATIVE for chest pain, palpitations or peripheral edema  GI: NEGATIVE for nausea, abdominal pain, heartburn, or change in bowel habits  : NEGATIVE for frequency, dysuria, or hematuria  M: NEGATIVE for significant arthralgias or myalgia  N: NEGATIVE for weakness, dizziness or paresthesias  E: NEGATIVE for temperature intolerance, skin/hair changes  H: NEGATIVE for bleeding problems  P: NEGATIVE for changes in mood or affect    This document serves as a record of the services and decisions personally performed and made by Ralph Maldonado MD. It was created on his/her behalf by Awa Mcdaniels, a trained medical scribe. The creation of this document is based the provider's  statements to the medical scribe.  Lynda Cejaarturo 8:35 AM, August 21, 2017    OBJECTIVE:   /75  Pulse 69  Temp 97.9  F (36.6  C) (Oral)  Ht 1.829 m (6')  Wt 61.2 kg (135 lb)  SpO2 97%  BMI 18.31 kg/m2 Estimated body mass index is 18.28 kg/(m^2) as calculated from the following:    Height as of 7/18/17: 6' (1.829 m).    Weight as of 7/18/17: 134 lb 12.8 oz (61.1 kg).  EXAM:   GENERAL: healthy, alert and no distress  EYES: Eyes grossly normal to inspection, PERRL and conjunctivae and sclerae normal  HENT: ear canals and TM's normal, nose and mouth without ulcers or lesions, poor dentition  NECK: no adenopathy, no asymmetry, masses, or scars and thyroid normal to palpation, minimal loss of rage of motion  RESP: lungs clear to auscultation - no rales,or wheezes, decreased respirations with expiratory rhonchi bilaterally  CV: regular rate and rhythm, normal S1 S2, no S3 or S4, no murmur, click or rub, no peripheral edema and peripheral pulses strong  ABDOMEN: soft, nontender, no hepatosplenomegaly, no masses and bowel sounds normal   (male): normal male genitalia without lesions or urethral discharge, no hernia, prostate gland is 2+ and smooth without nodules  MS: no gross musculoskeletal defects noted, no edema, superficial varicosities in his feet and ankles, he has bilateral femoral bruits otherwise pulses are normal  Back: minor scoliosis   SKIN: no suspicious lesions or rashes  NEURO: Normal strength and tone, mentation intact and speech normal, paraesthesias of the plantar feet bilaterally  PSYCH: mentation appears normal, affect normal/bright    ASSESSMENT / PLAN:   1. Essential hypertension  - Comprehensive metabolic panel  Reviewed the benefits of an exercise program in addition to his daily ADL's  Reevaluate blood pressure  2. Hyperlipidemia LDL goal <130  - Lipid panel reflex to direct LDL    3. Other polyneuropathy (H)  Evaluation for printable causes of neuropathy. Patient using  orthotics. The sensations are not debilitating at this point and no treatment is necessary however the patient was educated about balance  And the availability of treatment modalities if necessary.  4. Other emphysema (H)  - Spirometry, Breathing Capacity: Normal Order, Clinic Performed  - order for DME; Dog walking three times weekly  Dispense: 1 unit marking on an U-100 insulin syringe; Refill: 11    5. Tobacco use disorder  -Current every day smoker. Patient is using 6 cigarettes daily.    6. Other osteoarthritis of spine, cervical region  -Status quo, patient manages pain with Aleve.    7. Encounter for routine adult health examination without abnormal findings  - UA reflex to Microscopic and Culture  - CBC with platelets  - Comprehensive metabolic panel    8. Screening for prostate cancer  - Prostate spec antigen screen    9. Fatigue, unspecified type  - Vitamin B12  - TSH    10. Hyperglycemia  - Hemoglobin A1c    -Will start using Rescue inhaler, TID before breakfast, lunch, and dinner to see if it will improve his breathing.  -Patient will start walking his dog for at least 30 minutes a day 3 to 4 times per week.    Life Planning:  Patient currently has an advanced directive: Yes.  Practitioner is supportive of decision.    COUNSELING:  Reviewed preventive health counseling, as reflected in patient instructions       Regular exercise       Healthy diet/nutrition       Vision screening       Hearing screening       Dental care    Estimated body mass index is 18.28 kg/(m^2) as calculated from the following:    Height as of 7/18/17: 6' (1.829 m).    Weight as of 7/18/17: 134 lb 12.8 oz (61.1 kg).     reports that he has been smoking Cigarettes.  He has a 33.75 pack-year smoking history. He has never used smokeless tobacco.  Tobacco Cessation Action Plan: Information offered: Patient not interested at this time    Appropriate preventive services were discussed with this patient, including applicable screening as  appropriate for cardiovascular disease, diabetes, osteopenia/osteoporosis, and glaucoma.  As appropriate for age/gender, discussed screening for colorectal cancer, prostate cancer, breast cancer, and cervical cancer. Checklist reviewing preventive services available has been given to the patient.    Reviewed patients plan of care and provided an AVS. The Basic Care Plan (routine screening as documented in Health Maintenance) for Primo meets the Care Plan requirement. This Care Plan has been established and reviewed with the Patient.    Counseling Resources:  ATP IV Guidelines  Pooled Cohorts Equation Calculator  Breast Cancer Risk Calculator  FRAX Risk Assessment  ICSI Preventive Guidelines  Dietary Guidelines for Americans, 2010  USDA's MyPlate  ASA Prophylaxis  Lung CA Screening    Ralph Maldonado MD  [facility]    The information in this document, created by the medical scribe for me, accurately reflects the services I personally performed and the decisions made by me. I have reviewed and approved this document for accuracy prior to leaving the patient care area.  Ralph Maldonado MD  9:26 AM, 08/21/17

## 2017-10-28 ENCOUNTER — HOSPITAL ENCOUNTER (EMERGENCY)
Facility: CLINIC | Age: 77
Discharge: HOME | End: 2017-10-28
Attending: NURSE PRACTITIONER | Admitting: NURSE PRACTITIONER
Payer: MEDICARE

## 2017-10-28 VITALS
SYSTOLIC BLOOD PRESSURE: 178 MMHG | WEIGHT: 135 LBS | RESPIRATION RATE: 18 BRPM | TEMPERATURE: 98.2 F | DIASTOLIC BLOOD PRESSURE: 87 MMHG | OXYGEN SATURATION: 98 % | HEIGHT: 72 IN | BODY MASS INDEX: 18.28 KG/M2

## 2017-10-28 DIAGNOSIS — R04.0 EPISTAXIS: ICD-10-CM

## 2017-10-28 PROCEDURE — 99284 EMERGENCY DEPT VISIT MOD MDM: CPT | Mod: 25

## 2017-10-28 PROCEDURE — 30901 CONTROL OF NOSEBLEED: CPT | Mod: RT

## 2017-10-28 ASSESSMENT — ENCOUNTER SYMPTOMS
FEVER: 0
RESPIRATORY NEGATIVE: 1
LIGHT-HEADEDNESS: 0
CHILLS: 0
CARDIOVASCULAR NEGATIVE: 1

## 2017-10-28 NOTE — ED PROVIDER NOTES
History     Chief Complaint:  Epistaxis     HPI   Primo Dorsey is a 77 year old male with a history of seasonal allergies who presents with epistaxis. Four days ago, patient was using his OTC saline nasal wash at home for some nasal congestion when he reports onset of right epistaxis. He applied pressure and packed it with tissue with eventual resolution by the next morning. He went to work as normal three days ago, but two days ago he reports recurrence of right epistaxis lasting about 30 minutes, and resolving with ice and manual pressure. He had recurrence of right epistaxis again last night, which lasted for some 1.5 hours before resolving with manual pressure, ice, and tissue packing. He has not had any recurrence since then, and no active bleeding at time of evaluation. The patient denies vomiting, lightheadedness, chest pain, palpitations, dyspnea, fever or chills, or any other acute symptoms. He takes aspirin 81mg daily but no other chronic anticoagulation.       Allergies:  NKDA     Medications:    Aspirin 81mg  Losartan  levalbuterol   Simvastatin   Fluticasone      Past Medical History:    COPD  Hypertension  Hyperlipidemia   DJD  Inguinal herniae   Seasonal allergies  Hearing loss     Past Surgical History:    Appendectomy   Herniorrhaphy, umbilical, bilateral inguina  Tonsillectomy    Family History:    CAD (brother with CABG)  Ca, gastric and lung (brother)    Social History:  Current 0.75ppd tobacco smoker x45 years. Consumes 1 glass wine/night.   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    Review of Systems   Constitutional: Negative for chills and fever.   HENT: Positive for congestion and nosebleeds.    Respiratory: Negative.    Cardiovascular: Negative.    Neurological: Negative for light-headedness.   All other systems reviewed and are negative.      Physical Exam     Patient Vitals for the past 24 hrs:   BP Temp Temp src Heart Rate Resp SpO2  Height Weight   10/28/17 1318 178/87 98.2  F (36.8  C) Oral 97 18 98 % 1.829 m (6') 61.2 kg (135 lb)        Physical Exam  Constitutional: Non toxic appearing.   Head: Head moves freely with normal range of motion.   ENT: Oropharynx is clear and moist. Nares with no active bleeding. Clear Rhinorrhea noted. No blood noted posterior oropharynx. No prominent Kiesselbach's plexus noted on the right/affected nare. The tissues is friable in the right nare, inferior aspect at the septum.    Eyes: Conjunctivae pink. EOMs intact.  Cardiovascular: Regular rate and rhythm. Normal heart sounds. No concerning murmur.  Pulmonary/Chest: No respiratory distress. Breath sounds normal.   Neurological: Oriented to person, place, and time. No focal deficits.   Skin: Skin is warm. No rash noted.        Emergency Department Course   Procedure:  PROCEDURE: Silver Nitrate Cautery    PROCEDURE NOTE:  The friable tissue at the septum in the right nare was cauterized with silver nitrate.  The patient tolerated the chemical cautery well.      Emergency Department Course:  Past medical records, nursing notes, and vitals reviewed.   1325: I performed an exam of the patient as documented above.     1340: I rechecked patient. Clinical findings and plan explained to the Patient. Patient discharged home with instructions regarding supportive care, medications, and reasons to return as well as the importance of close follow-up were reviewed.       Impression & Plan    Medical Decision Making:  Primo Dorsey is a 77 year old male who is not on chronic anticoagulation and presents for evaluation of epistaxis that has since resolved. Last episode yesterday evening lasting 1/5 hours. There is a friable area at the inferior septum in the right nare that I did chemically cauterize with silver nitrate. We discussed no blowing his norse and sneezing with his mouth open as well as what to do if the bleeding recurs. Also, he was given ENT contact information  for follow up if recurring bleeding.     Diagnosis:    ICD-10-CM    1. Epistaxis R04.0     resolved       Disposition:  discharged to home    I, Esvin Frey, am serving as a scribe at 1:23 PM on 10/28/2017 to document services personally performed by Yareli Ghosh NP  based on my observations and the provider's statements to me.      Esvin Frey  10/28/2017    EMERGENCY DEPARTMENT       Yareli Ghosh, AUBRIE CRENSHAW  10/30/17 1238

## 2017-10-28 NOTE — ED NOTES
Tuesday night pt used his nasal spray, nose bleed started after and throughout the night, no bleeding on Wed.  Thursday nosebleed for about 0.5 hour, stopped after ice was applied.  Again nosebleed started last evening, ice pack applied with not relief, bleeding for about 1.5 hours.

## 2017-10-28 NOTE — DISCHARGE INSTRUCTIONS
Do not blow your nose. If you sneeze, sneeze with your mouth open.     Nosebleed (Adult)    Bleeding from the nose most commonly occurs because of injury or drying and cracking of the inner lining of the nose. Most nosebleeds are because of dry air or nose-picking. They can occur during a common cold or an allergy attack. They can also occur on a very hot day, or from dry air in the winter.  If the bleeding site is found, it may be cauterized. This means it is treated to cause a blood clot to form. This may be done with a chemical, heat, or electricity. If the bleeding continues after the site is cauterized, or if the site cannot be found, packing may be put in your nose. This is to apply pressure and stop the bleeding. The packing may be made of gauze or sponge. A small balloon catheter is sometimes used. These must be removed by your doctor. Some types of packing dissolve on their own.  Home care    If packing was put in your nose, unless told otherwise, do not pull on it or try to remove it yourself. You will be given an appointment to have it removed. You may also have been given antibiotics to prevent a sinus infection. If so, finish all of the medicine.    Do not blow your nose for 12 hours after the bleeding stops. This will allow a strong blood clot to form. Do not pick your nose. This may restart bleeding.    Avoid drinking alcohol and hot liquids for the next 2 days. Alcohol or hot liquids in your mouth can dilate blood vessels in your nose. This can cause bleeding to start again.    Do not take ibuprofen, naproxen, or medicines that contain aspirin. These thin the blood and may cause your nose to bleed. You may take acetaminophen for pain, unless another pain medicine was prescribed.    If the bleeding starts again, sit up and lean forward to prevent swallowing blood. Pinch your nose tightly on both sides, as shown above, for 10 to 15 minutes. Time yourself. Don t release the pressure on your nose until  10 minutes is up. If bleeding does not stop, continue to pinch your nose and call your healthcare provider or return to this facility.    If you have a cold, allergies, or dry nasal membranes, lubricate the nasal passages. Apply a small amount of petroleum jelly inside the nose with a cotton swab twice a day (morning and night).    Avoid overheating your home. This can dry the air and make your condition worse.    Put a humidifier in the room where you sleep. This will add moisture to the air.    Use a saline nasal spray to keep nasal passages moist.    Do not pick your nose. Keep fingernails trimmed to decrease risk of bleeds.    Do not smoke.  Follow-up care  Follow up with your healthcare provider, or as advised. Nasal packing should be rechecked or removed within 2 to 3 days.  When to seek medical advice  Call your healthcare provider right away if any of these occur.    You have another nosebleed that you cannot control    Dizziness, weakness, or fainting    You become tired or confused    Fever of 100.4 F (38 C) or higher, or as directed by your healthcare provider    Headache    Sinus or facial pain    Shortness of breath or trouble breathing  Date Last Reviewed: 3/22/2015    4092-4566 The [employer]. [address] [place] [address], [place] [address]. All rights reserved. This information is not intended as a substitute for professional medical care. Always follow your healthcare professional's instructions.

## 2017-10-28 NOTE — ED AVS SNAPSHOT
Emergency Department    [address] [place] [id number]    Phone:  [phone]    Fax:  [phone]                                       Primo Dorsey   MRN: [id number]    Department:   Emergency Department   Date of Visit:  10/28/2017           After Visit Summary Signature Page     I have received my discharge instructions, and my questions have been answered. I have discussed any challenges I see with this plan with the nurse or doctor.    ..........................................................................................................................................  Patient/Patient Representative Signature      ..........................................................................................................................................  Patient Representative Print Name and Relationship to Patient    ..................................................               ................................................  Date                                            Time    ..........................................................................................................................................  Reviewed by Signature/Title    ...................................................              ..............................................  Date                                                            Time

## 2017-10-28 NOTE — ED AVS SNAPSHOT
Emergency Department    [address] [facility] [address]    Phone:  [phone]    Fax:  [phone]                                       Primo Doresy   MRN: [id number]    Department:   Emergency Department   Date of Visit:  10/28/2017           Patient Information     Date Of Birth          1940        Your diagnoses for this visit were:     Epistaxis resolved       You were seen by Yareli Ghosh APRN CNP.      Follow-up Information     Follow up with Naren aDvila MD In 5 days.    Specialty:  Otolaryngology    Why:  if you have recuurent bleeding    Contact information:    [facility] OTOLARYNGOLOGY  [address] [address] [phone]          Discharge Instructions         Do not blow your nose. If you sneeze, sneeze with your mouth open.     Nosebleed (Adult)    Bleeding from the nose most commonly occurs because of injury or drying and cracking of the inner lining of the nose. Most nosebleeds are because of dry air or nose-picking. They can occur during a common cold or an allergy attack. They can also occur on a very hot day, or from dry air in the winter.  If the bleeding site is found, it may be cauterized. This means it is treated to cause a blood clot to form. This may be done with a chemical, heat, or electricity. If the bleeding continues after the site is cauterized, or if the site cannot be found, packing may be put in your nose. This is to apply pressure and stop the bleeding. The packing may be made of gauze or sponge. A small balloon catheter is sometimes used. These must be removed by your doctor. Some types of packing dissolve on their own.  Home care    If packing was put in your nose, unless told otherwise, do not pull on it or try to remove it yourself. You will be given an appointment to have it removed. You may also have been given antibiotics to prevent a sinus infection. If so, finish all of the medicine.    Do not blow  your nose for 12 hours after the bleeding stops. This will allow a strong blood clot to form. Do not pick your nose. This may restart bleeding.    Avoid drinking alcohol and hot liquids for the next 2 days. Alcohol or hot liquids in your mouth can dilate blood vessels in your nose. This can cause bleeding to start again.    Do not take ibuprofen, naproxen, or medicines that contain aspirin. These thin the blood and may cause your nose to bleed. You may take acetaminophen for pain, unless another pain medicine was prescribed.    If the bleeding starts again, sit up and lean forward to prevent swallowing blood. Pinch your nose tightly on both sides, as shown above, for 10 to 15 minutes. Time yourself. Don t release the pressure on your nose until 10 minutes is up. If bleeding does not stop, continue to pinch your nose and call your healthcare provider or return to this facility.    If you have a cold, allergies, or dry nasal membranes, lubricate the nasal passages. Apply a small amount of petroleum jelly inside the nose with a cotton swab twice a day (morning and night).    Avoid overheating your home. This can dry the air and make your condition worse.    Put a humidifier in the room where you sleep. This will add moisture to the air.    Use a saline nasal spray to keep nasal passages moist.    Do not pick your nose. Keep fingernails trimmed to decrease risk of bleeds.    Do not smoke.  Follow-up care  Follow up with your healthcare provider, or as advised. Nasal packing should be rechecked or removed within 2 to 3 days.  When to seek medical advice  Call your healthcare provider right away if any of these occur.    You have another nosebleed that you cannot control    Dizziness, weakness, or fainting    You become tired or confused    Fever of 100.4 F (38 C) or higher, or as directed by your healthcare provider    Headache    Sinus or facial pain    Shortness of breath or trouble breathing  Date Last Reviewed:  3/22/2015    7719-9892 [employer] by [employer]. [address] [place] [address], [place] [address]. All rights reserved. This information is not intended as a substitute for professional medical care. Always follow your healthcare professional's instructions.          24 Hour Appointment Hotline       To make an appointment at any [facility], call [phone] ([phone]). If you don't have a family doctor or clinic, we will help you find one. [facility] are conveniently located to serve the needs of you and your family.             Review of your medicines      Our records show that you are taking the medicines listed below. If these are incorrect, please call your family doctor or clinic.        Dose / Directions Last dose taken    aspirin 81 MG tablet        1 tab po QD (Once per day)   Refills:  0        FLONASE 50 MCG/ACT spray   Dose:  1-2 spray   Quantity:  16 g   Generic drug:  fluticasone        Spray 1-2 sprays into both nostrils daily   Refills:  1        fluticasone-salmeterol 250-50 MCG/DOSE diskus inhaler   Commonly known as:  ADVAIR   Dose:  1 puff   Quantity:  3 Inhaler        Inhale 1 puff into the lungs 2 times daily   Refills:  3        levalbuterol 45 MCG/ACT Inhaler   Commonly known as:  XOPENEX HFA   Dose:  2 puff   Quantity:  1 Inhaler        Inhale 2 puffs into the lungs every 6 hours as needed for shortness of breath / dyspnea or wheezing   Refills:  3        losartan 100 MG tablet   Commonly known as:  COZAAR   Quantity:  90 tablet        take 1 tablet by mouth once daily.   Refills:  3        order for DME   Quantity:  1 unit marking on an U-100 insulin syringe        Dog walking three times weekly   Refills:  11        PRO-BIOTIC BLEND PO        Refills:  0        * simvastatin 20 MG tablet   Commonly known as:  ZOCOR   Quantity:  90 tablet        TAKE 1 TABLET BY MOUTH EVERY NIGHT AT BEDTIME.   Refills:  0        * simvastatin 20 MG tablet   Commonly known as:  ZOCOR   Dose:   20 mg   Quantity:  90 tablet        Take 1 tablet (20 mg) by mouth At Bedtime   Refills:  3        * Notice:  This list has 2 medication(s) that are the same as other medications prescribed for you. Read the directions carefully, and ask your doctor or other care provider to review them with you.            Orders Needing Specimen Collection     None      Pending Results     No orders found from 10/26/2017 to 10/29/2017.            Pending Culture Results     No orders found from 10/26/2017 to 10/29/2017.            Pending Results Instructions     If you had any lab results that were not finalized at the time of your Discharge, you can call the ED Lab Result RN at [phone]. You will be contacted by this team for any positive Lab results or changes in treatment. The nurses are available 7 days a week from 10A to 6:30P.  You can leave a message 24 hours per day and they will return your call.        Test Results From Your Hospital Stay               Clinical Quality Measure: Blood Pressure Screening     Your blood pressure was checked while you were in the emergency department today. The last reading we obtained was  BP: 178/87 . Please read the guidelines below about what these numbers mean and what you should do about them.  If your systolic blood pressure (the top number) is less than 120 and your diastolic blood pressure (the bottom number) is less than 80, then your blood pressure is normal. There is nothing more that you need to do about it.  If your systolic blood pressure (the top number) is 120-139 or your diastolic blood pressure (the bottom number) is 80-89, your blood pressure may be higher than it should be. You should have your blood pressure rechecked within a year by a primary care provider.  If your systolic blood pressure (the top number) is 140 or greater or your diastolic blood pressure (the bottom number) is 90 or greater, you may have high blood pressure. High blood pressure is treatable,  "but if left untreated over time it can put you at risk for heart attack, stroke, or kidney failure. You should have your blood pressure rechecked by a primary care provider within the next 4 weeks.  If your provider in the emergency department today gave you specific instructions to follow-up with your doctor or provider even sooner than that, you should follow that instruction and not wait for up to 4 weeks for your follow-up visit.        Thank you for choosing [facility]       Thank you for choosing [facility] for your care. Our goal is always to provide you with excellent care. Hearing back from our patients is one way we can continue to improve our services. Please take a few minutes to complete the written survey that you may receive in the mail after you visit with us. Thank you!        [employer] Information     [employer] lets you send messages to your doctor, view your test results, renew your prescriptions, schedule appointments and more. To sign up, go to [email or web address] . Click on \"Log in\" on the left side of the screen, which will take you to the Welcome page. Then click on \"Sign up Now\" on the right side of the page.     You will be asked to enter the access code listed below, as well as some personal information. Please follow the directions to create your username and password.     Your access code is: [email or web address]  Expires: 2018  2:01 PM     Your access code will  in 90 days. If you need help or a new code, please call your [facility] clinic or [phone].        Care EveryWhere ID     This is your Care EveryWhere ID. This could be used by other organizations to access your [facility] medical records  [id number]        Equal Access to Services     MAISHA CARRILLO : Jose Antonio Heller, aleisha carter, jose taveras. So [facility] [phone].    ATENCIÓN: Si habla español, tiene a dee disposición servicios gratuitos de asistencia " adalgisa Allenalessia al [phone].    We comply with applicable federal civil rights laws and [place] laws. We do not discriminate on the basis of race, color, national origin, age, disability, sex, sexual orientation, or gender identity.            After Visit Summary       This is your record. Keep this with you and show to your community pharmacist(s) and doctor(s) at your next visit.

## 2017-11-24 DIAGNOSIS — J44.1 COPD EXACERBATION (H): ICD-10-CM

## 2017-11-27 NOTE — TELEPHONE ENCOUNTER
levalbuterol       Last Written Prescription Date:  11/16/16  Last Fill Quantity: 1,   # refills: 3  Last Office Visit: 8/21/17  Future Office visit:       Routing refill request to provider for review/approval because:  Diagnosis not on problem list.

## 2017-11-28 RX ORDER — LEVALBUTEROL TARTRATE 45 UG/1
2 AEROSOL, METERED ORAL EVERY 6 HOURS PRN
Qty: 1 INHALER | Refills: 3 | Status: SHIPPED | OUTPATIENT
Start: 2017-11-28 | End: 2018-01-01

## 2018-01-01 ENCOUNTER — TELEPHONE (OUTPATIENT)
Dept: FAMILY MEDICINE | Facility: CLINIC | Age: 78
End: 2018-01-01

## 2018-01-01 ENCOUNTER — OFFICE VISIT (OUTPATIENT)
Dept: FAMILY MEDICINE | Facility: CLINIC | Age: 78
End: 2018-01-01
Payer: COMMERCIAL

## 2018-01-01 VITALS
SYSTOLIC BLOOD PRESSURE: 141 MMHG | DIASTOLIC BLOOD PRESSURE: 73 MMHG | TEMPERATURE: 97.1 F | BODY MASS INDEX: 17.61 KG/M2 | OXYGEN SATURATION: 97 % | HEART RATE: 68 BPM | HEIGHT: 72 IN | WEIGHT: 130 LBS

## 2018-01-01 VITALS
WEIGHT: 128 LBS | BODY MASS INDEX: 17.34 KG/M2 | TEMPERATURE: 97.2 F | HEART RATE: 76 BPM | OXYGEN SATURATION: 99 % | SYSTOLIC BLOOD PRESSURE: 130 MMHG | HEIGHT: 72 IN | DIASTOLIC BLOOD PRESSURE: 60 MMHG

## 2018-01-01 VITALS
WEIGHT: 131 LBS | HEART RATE: 71 BPM | DIASTOLIC BLOOD PRESSURE: 78 MMHG | TEMPERATURE: 97.7 F | OXYGEN SATURATION: 98 % | SYSTOLIC BLOOD PRESSURE: 169 MMHG | BODY MASS INDEX: 17.74 KG/M2 | HEIGHT: 72 IN

## 2018-01-01 DIAGNOSIS — J43.8 OTHER EMPHYSEMA (H): ICD-10-CM

## 2018-01-01 DIAGNOSIS — J30.2 CHRONIC SEASONAL ALLERGIC RHINITIS, UNSPECIFIED TRIGGER: ICD-10-CM

## 2018-01-01 DIAGNOSIS — F17.200 TOBACCO USE DISORDER: ICD-10-CM

## 2018-01-01 DIAGNOSIS — M47.892 OTHER OSTEOARTHRITIS OF SPINE, CERVICAL REGION: ICD-10-CM

## 2018-01-01 DIAGNOSIS — I10 ESSENTIAL HYPERTENSION: ICD-10-CM

## 2018-01-01 DIAGNOSIS — Z12.11 SPECIAL SCREENING FOR MALIGNANT NEOPLASMS, COLON: ICD-10-CM

## 2018-01-01 DIAGNOSIS — E78.5 HYPERLIPIDEMIA LDL GOAL <130: ICD-10-CM

## 2018-01-01 DIAGNOSIS — K40.20 BILATERAL INGUINAL HERNIA WITHOUT OBSTRUCTION OR GANGRENE, RECURRENCE NOT SPECIFIED: ICD-10-CM

## 2018-01-01 DIAGNOSIS — I10 ESSENTIAL HYPERTENSION: Primary | ICD-10-CM

## 2018-01-01 DIAGNOSIS — B37.0 THRUSH: ICD-10-CM

## 2018-01-01 DIAGNOSIS — E55.9 VITAMIN D DEFICIENCY: ICD-10-CM

## 2018-01-01 DIAGNOSIS — J44.1 COPD EXACERBATION (H): ICD-10-CM

## 2018-01-01 DIAGNOSIS — Z00.00 ENCOUNTER FOR ROUTINE ADULT HEALTH EXAMINATION WITHOUT ABNORMAL FINDINGS: Primary | ICD-10-CM

## 2018-01-01 DIAGNOSIS — Z12.5 SCREENING FOR PROSTATE CANCER: ICD-10-CM

## 2018-01-01 LAB
ALBUMIN SERPL-MCNC: 4.3 G/DL (ref 3.4–5)
ALBUMIN UR-MCNC: NEGATIVE MG/DL
ALP SERPL-CCNC: 75 U/L (ref 40–150)
ALT SERPL W P-5'-P-CCNC: 22 U/L (ref 0–70)
ANION GAP SERPL CALCULATED.3IONS-SCNC: 11 MMOL/L (ref 3–14)
APPEARANCE UR: CLEAR
AST SERPL W P-5'-P-CCNC: 26 U/L (ref 0–45)
BILIRUB SERPL-MCNC: 0.6 MG/DL (ref 0.2–1.3)
BILIRUB UR QL STRIP: NEGATIVE
BUN SERPL-MCNC: 16 MG/DL (ref 7–30)
CALCIUM SERPL-MCNC: 9 MG/DL (ref 8.5–10.1)
CHLORIDE SERPL-SCNC: 105 MMOL/L (ref 94–109)
CHOLEST SERPL-MCNC: 160 MG/DL
CO2 SERPL-SCNC: 24 MMOL/L (ref 20–32)
COLOR UR AUTO: YELLOW
CREAT SERPL-MCNC: 0.84 MG/DL (ref 0.66–1.25)
DEPRECATED CALCIDIOL+CALCIFEROL SERPL-MC: 25 UG/L (ref 20–75)
ERYTHROCYTE [DISTWIDTH] IN BLOOD BY AUTOMATED COUNT: 14.3 % (ref 10–15)
FEF 25/75: NORMAL
FEV-1: NORMAL
FEV1/FVC: NORMAL
FVC: NORMAL
GFR SERPL CREATININE-BSD FRML MDRD: 89 ML/MIN/1.7M2
GLUCOSE SERPL-MCNC: 100 MG/DL (ref 70–99)
GLUCOSE UR STRIP-MCNC: NEGATIVE MG/DL
HCT VFR BLD AUTO: 41 % (ref 40–53)
HDLC SERPL-MCNC: 66 MG/DL
HGB BLD-MCNC: 13.4 G/DL (ref 13.3–17.7)
HGB UR QL STRIP: NEGATIVE
KETONES UR STRIP-MCNC: NEGATIVE MG/DL
LDLC SERPL CALC-MCNC: 80 MG/DL
LEUKOCYTE ESTERASE UR QL STRIP: NEGATIVE
MCH RBC QN AUTO: 30.2 PG (ref 26.5–33)
MCHC RBC AUTO-ENTMCNC: 32.7 G/DL (ref 31.5–36.5)
MCV RBC AUTO: 93 FL (ref 78–100)
NITRATE UR QL: NEGATIVE
NONHDLC SERPL-MCNC: 94 MG/DL
PH UR STRIP: 7 PH (ref 5–7)
PLATELET # BLD AUTO: 214 10E9/L (ref 150–450)
POTASSIUM SERPL-SCNC: 4 MMOL/L (ref 3.4–5.3)
PROT SERPL-MCNC: 7.5 G/DL (ref 6.8–8.8)
PSA SERPL-ACNC: 1.46 UG/L (ref 0–4)
RBC # BLD AUTO: 4.43 10E12/L (ref 4.4–5.9)
SODIUM SERPL-SCNC: 140 MMOL/L (ref 133–144)
SOURCE: NORMAL
SP GR UR STRIP: 1.01 (ref 1–1.03)
TRIGL SERPL-MCNC: 69 MG/DL
UROBILINOGEN UR STRIP-ACNC: 0.2 EU/DL (ref 0.2–1)
WBC # BLD AUTO: 8.8 10E9/L (ref 4–11)

## 2018-01-01 PROCEDURE — G0103 PSA SCREENING: HCPCS | Performed by: INTERNAL MEDICINE

## 2018-01-01 PROCEDURE — 99397 PER PM REEVAL EST PAT 65+ YR: CPT | Mod: 25 | Performed by: INTERNAL MEDICINE

## 2018-01-01 PROCEDURE — 99213 OFFICE O/P EST LOW 20 MIN: CPT | Performed by: INTERNAL MEDICINE

## 2018-01-01 PROCEDURE — 81003 URINALYSIS AUTO W/O SCOPE: CPT | Performed by: INTERNAL MEDICINE

## 2018-01-01 PROCEDURE — 80053 COMPREHEN METABOLIC PANEL: CPT | Performed by: INTERNAL MEDICINE

## 2018-01-01 PROCEDURE — 80061 LIPID PANEL: CPT | Performed by: INTERNAL MEDICINE

## 2018-01-01 PROCEDURE — 36415 COLL VENOUS BLD VENIPUNCTURE: CPT | Performed by: INTERNAL MEDICINE

## 2018-01-01 PROCEDURE — 94010 BREATHING CAPACITY TEST: CPT | Performed by: INTERNAL MEDICINE

## 2018-01-01 PROCEDURE — 85027 COMPLETE CBC AUTOMATED: CPT | Performed by: INTERNAL MEDICINE

## 2018-01-01 PROCEDURE — 82306 VITAMIN D 25 HYDROXY: CPT | Performed by: INTERNAL MEDICINE

## 2018-01-01 RX ORDER — NYSTATIN 100000/ML
500000 SUSPENSION, ORAL (FINAL DOSE FORM) ORAL 4 TIMES DAILY
Qty: 280 ML | Refills: 0 | Status: SHIPPED | OUTPATIENT
Start: 2018-01-01 | End: 2019-01-01

## 2018-01-01 RX ORDER — LEVALBUTEROL TARTRATE 45 UG/1
AEROSOL, METERED ORAL
Qty: 15 G | Refills: 2 | Status: SHIPPED | OUTPATIENT
Start: 2018-01-01 | End: 2019-01-01

## 2018-01-01 RX ORDER — SIMVASTATIN 20 MG
TABLET ORAL
Qty: 90 TABLET | Refills: 3 | Status: SHIPPED | OUTPATIENT
Start: 2018-01-01

## 2018-01-01 RX ORDER — LOSARTAN POTASSIUM 100 MG/1
TABLET ORAL
Qty: 90 TABLET | Refills: 2 | Status: SHIPPED | OUTPATIENT
Start: 2018-01-01 | End: 2019-01-01

## 2018-01-01 RX ORDER — LOSARTAN POTASSIUM 100 MG/1
TABLET ORAL
Qty: 90 TABLET | Refills: 0 | Status: SHIPPED | OUTPATIENT
Start: 2018-01-01 | End: 2018-01-01

## 2018-01-16 ENCOUNTER — OFFICE VISIT (OUTPATIENT)
Dept: FAMILY MEDICINE | Facility: CLINIC | Age: 78
End: 2018-01-16
Payer: COMMERCIAL

## 2018-01-16 VITALS
TEMPERATURE: 98.3 F | DIASTOLIC BLOOD PRESSURE: 78 MMHG | HEART RATE: 65 BPM | BODY MASS INDEX: 18.01 KG/M2 | OXYGEN SATURATION: 99 % | HEIGHT: 72 IN | WEIGHT: 133 LBS | SYSTOLIC BLOOD PRESSURE: 162 MMHG

## 2018-01-16 DIAGNOSIS — R05.9 COUGH: Primary | ICD-10-CM

## 2018-01-16 LAB
ERYTHROCYTE [DISTWIDTH] IN BLOOD BY AUTOMATED COUNT: 13.6 % (ref 10–15)
FLUAV+FLUBV AG SPEC QL: NEGATIVE
FLUAV+FLUBV AG SPEC QL: NEGATIVE
HCT VFR BLD AUTO: 39.5 % (ref 40–53)
HGB BLD-MCNC: 12.9 G/DL (ref 13.3–17.7)
MCH RBC QN AUTO: 30.3 PG (ref 26.5–33)
MCHC RBC AUTO-ENTMCNC: 32.7 G/DL (ref 31.5–36.5)
MCV RBC AUTO: 93 FL (ref 78–100)
PLATELET # BLD AUTO: 214 10E9/L (ref 150–450)
RBC # BLD AUTO: 4.26 10E12/L (ref 4.4–5.9)
SPECIMEN SOURCE: NORMAL
WBC # BLD AUTO: 8.1 10E9/L (ref 4–11)

## 2018-01-16 PROCEDURE — 85027 COMPLETE CBC AUTOMATED: CPT | Performed by: INTERNAL MEDICINE

## 2018-01-16 PROCEDURE — 36415 COLL VENOUS BLD VENIPUNCTURE: CPT | Performed by: INTERNAL MEDICINE

## 2018-01-16 PROCEDURE — 99213 OFFICE O/P EST LOW 20 MIN: CPT | Performed by: INTERNAL MEDICINE

## 2018-01-16 PROCEDURE — 87804 INFLUENZA ASSAY W/OPTIC: CPT | Mod: 59 | Performed by: INTERNAL MEDICINE

## 2018-01-16 RX ORDER — AZITHROMYCIN 250 MG/1
TABLET, FILM COATED ORAL
Qty: 6 TABLET | Refills: 0 | Status: SHIPPED | OUTPATIENT
Start: 2018-01-16 | End: 2018-01-01

## 2018-01-16 RX ORDER — METHYLPREDNISOLONE 4 MG
TABLET, DOSE PACK ORAL
Qty: 21 TABLET | Refills: 0 | Status: SHIPPED | OUTPATIENT
Start: 2018-01-16 | End: 2018-01-01

## 2018-01-16 NOTE — PROGRESS NOTES
SUBJECTIVE:   Primo Dorsey is a 77 year old male who presents to clinic today for the following health issues:      RESPIRATORY SYMPTOMS      Duration: x2.5 weeks    Description  nasal congestion, rhinorrhea, cough, wheezing, fever, chills, headache, fatigue/malaise, hoarse voice, nausea and stomach ache  Congested, dry cough - clear phlegm     Severity: moderate    Accompanying signs and symptoms: None    He denies shortness of breath, chest pain, facial pain    History (predisposing factors):  none    Precipitating or alleviating factors: None    Therapies tried and outcome:  Sudafed -helped dry up sinus a little but sx's still present.    The patient states that he has been cold for the past month. He is normally comfortable in 70 degrees but now he has to long underwear under his normal clothing. Despite his extra layers he still feels cold.    Problem list and histories reviewed & adjusted, as indicated.  Additional history: as documented    BP Readings from Last 3 Encounters:   01/16/18 162/78   10/28/17 178/87   08/21/17 155/75    Wt Readings from Last 3 Encounters:   01/16/18 60.3 kg (133 lb)   10/28/17 61.2 kg (135 lb)   08/21/17 61.2 kg (135 lb)                      Reviewed and updated as needed this visit by clinical staffTobacco  Allergies  Meds  Soc Hx      Reviewed and updated as needed this visit by Provider         ROS:  Constitutional, HEENT, cardiovascular, pulmonary, gi and gu systems are negative, except as otherwise noted.    This document serves as a record of the services and decisions personally performed and made by Ralph Maldonado MD. It was created on his behalf by Pao Barrow, a trained medical scribe. The creation of this document is based the provider's statements to the medical scribe. 1/16/2018  OBJECTIVE:   /78 (BP Location: Right arm, Patient Position: Sitting, Cuff Size: Adult Regular)  Pulse 65  Temp 98.3  F (36.8  C) (Oral)  Ht 1.829 m (6')  Wt 60.3 kg  (133 lb)  SpO2 99%  BMI 18.04 kg/m2  Body mass index is 18.04 kg/(m^2).  GENERAL: healthy, alert and no distress  HENT: ear canals and TM's normal, nose and mouth without ulcers or lesions, throat is erythematous  NECK: no adenopathy, no asymmetry, masses, or scars and thyroid normal to palpation  RESP: lungs clear to auscultation - no rales, rhonchi or wheezes, expiratory faint rhonchi  CV: regular rate and rhythm, normal S1 S2, no S3 or S4, no murmur, click or rub, no peripheral edema and peripheral pulses strong  ABDOMEN: soft, nontender, no hepatosplenomegaly, no masses and bowel sounds normal  MS: no gross musculoskeletal defects noted, no edema  SKIN: no suspicious lesions or rashes    Diagnostic Test Results:  Results for orders placed or performed in visit on 01/16/18 (from the past 24 hour(s))   Influenza A/B antigen   Result Value Ref Range    Influenza A/B Agn Specimen Nasal     Influenza A Negative NEG^Negative    Influenza B Negative NEG^Negative   CBC with platelets   Result Value Ref Range    WBC 8.1 4.0 - 11.0 10e9/L    RBC Count 4.26 (L) 4.4 - 5.9 10e12/L    Hemoglobin 12.9 (L) 13.3 - 17.7 g/dL    Hematocrit 39.5 (L) 40.0 - 53.0 %    MCV 93 78 - 100 fl    MCH 30.3 26.5 - 33.0 pg    MCHC 32.7 31.5 - 36.5 g/dL    RDW 13.6 10.0 - 15.0 %    Platelet Count 214 150 - 450 10e9/L       ASSESSMENT/PLAN:     1. Cough  Start taking Medrol and azithromycin regimens.  - Influenza A/B antigen - negative  - CBC with platelets  - methylPREDNISolone (MEDROL DOSEPAK) 4 MG tablet; Follow package instructions  Dispense: 21 tablet; Refill: 0  - azithromycin (ZITHROMAX) 250 MG tablet; Two tablets first day, then one tablet daily for four days.  Dispense: 6 tablet; Refill: 0    Ralph Maldonado MD  [facility]    The information in this document, created by the medical scribe for me, accurately reflects the services I personally performed and the decisions made by me. I have reviewed and approved this  document for accuracy prior to leaving the patient care area.  Ralph Maldonado MD  4:29 PM, 01/16/18

## 2018-01-16 NOTE — MR AVS SNAPSHOT
"              After Visit Summary   2018    Primo Dorsey    MRN: [id number]           Patient Information     Date Of Birth          1940        Visit Information        Provider Department      2018 4:00 PM Ralph Maldonado MD [facility]        Today's Diagnoses     Cough    -  1       Follow-ups after your visit        Who to contact     If you have questions or need follow up information about today's clinic visit or your schedule please contact [facility] directly at [phone].  Normal or non-critical lab and imaging results will be communicated to you by [employer], letter or phone within 4 business days after the clinic has received the results. If you do not hear from us within 7 days, please contact the clinic through [employer] or phone. If you have a critical or abnormal lab result, we will notify you by phone as soon as possible.  Submit refill requests through [employer] or call your pharmacy and they will forward the refill request to us. Please allow 3 business days for your refill to be completed.          Additional Information About Your Visit        MyChart Information     [employer] lets you send messages to your doctor, view your test results, renew your prescriptions, schedule appointments and more. To sign up, go to [email or web address] . Click on \"Log in\" on the left side of the screen, which will take you to the Welcome page. Then click on \"Sign up Now\" on the right side of the page.     You will be asked to enter the access code listed below, as well as some personal information. Please follow the directions to create your username and password.     Your access code is: [email or web address]  Expires: 2018  1:01 PM     Your access code will  in 90 days. If you need help or a new code, please call your [facility] or [phone].        Care EveryWhere ID     This is your Care EveryWhere ID. This could be used by other organizations to access your " [place] medical records  [id number]        Your Vitals Were     Pulse Temperature Height Pulse Oximetry BMI (Body Mass Index)       65 98.3  F (36.8  C) (Oral) 6' (1.829 m) 99% 18.04 kg/m2        Blood Pressure from Last 3 Encounters:   01/16/18 162/78   10/28/17 178/87   08/21/17 155/75    Weight from Last 3 Encounters:   01/16/18 133 lb (60.3 kg)   10/28/17 135 lb (61.2 kg)   08/21/17 135 lb (61.2 kg)              We Performed the Following     CBC with platelets     Influenza A/B antigen          Today's Medication Changes          These changes are accurate as of: 1/16/18 11:59 PM.  If you have any questions, ask your nurse or doctor.               Start taking these medicines.        Dose/Directions    azithromycin 250 MG tablet   Commonly known as:  ZITHROMAX   Used for:  Cough   Started by:  Ralph Maldonado MD        Two tablets first day, then one tablet daily for four days.   Quantity:  6 tablet   Refills:  0       methylPREDNISolone 4 MG tablet   Commonly known as:  MEDROL DOSEPAK   Used for:  Cough   Started by:  Ralph Maldonado MD        Follow package instructions   Quantity:  21 tablet   Refills:  0            Where to get your medicines      These medications were sent to [id number] - [place] [address]  [address] [address]     Phone:  [phone]     azithromycin 250 MG tablet    methylPREDNISolone 4 MG tablet                Primary Care Provider Office Phone # Fax #    Ralph Maldonado [phone] [address] [address]        Equal Access to Services     [facility] LORENA : Haddiego Heller, aleisha carter, jose taveras. So [facility] [phone].    ATENCIÓN: Si habla español, tiene a dee disposición servicios gratuitos de asistencia lingüística. Llame al [phone].    We comply with applicable federal civil rights laws and [place]  laws. We do not discriminate on the basis of race, color, national origin, age, disability, sex, sexual orientation, or gender identity.            Thank you!     Thank you for choosing [facility]  for your care. Our goal is always to provide you with excellent care. Hearing back from our patients is one way we can continue to improve our services. Please take a few minutes to complete the written survey that you may receive in the mail after your visit with us. Thank you!             Your Updated Medication List - Protect others around you: Learn how to safely use, store and throw away your medicines at www.disposemymeds.org.          This list is accurate as of: 1/16/18 11:59 PM.  Always use your most recent med list.                   Brand Name Dispense Instructions for use Diagnosis    aspirin 81 MG tablet      1 tab po QD (Once per day)        azithromycin 250 MG tablet    ZITHROMAX    6 tablet    Two tablets first day, then one tablet daily for four days.    Cough       B-12 1000 MCG Caps      Take 1 tablet by mouth        FLONASE 50 MCG/ACT spray   Generic drug:  fluticasone     16 g    Spray 1-2 sprays into both nostrils daily        fluticasone-salmeterol 250-50 MCG/DOSE diskus inhaler    ADVAIR    3 Inhaler    Inhale 1 puff into the lungs 2 times daily    Other emphysema (H)       levalbuterol 45 MCG/ACT Inhaler    XOPENEX HFA    1 Inhaler    Inhale 2 puffs into the lungs every 6 hours as needed for shortness of breath / dyspnea or wheezing    COPD exacerbation (H)       losartan 100 MG tablet    COZAAR    90 tablet    take 1 tablet by mouth once daily.    Essential hypertension       methylPREDNISolone 4 MG tablet    MEDROL DOSEPAK    21 tablet    Follow package instructions    Cough       order for DME     1 unit marking on an U-100 insulin syringe    Dog walking three times weekly    Other emphysema (H)       PRO-BIOTIC BLEND PO           simvastatin 20 MG tablet    ZOCOR    90 tablet     Take 1 tablet (20 mg) by mouth At Bedtime    Hyperlipidemia LDL goal <130       VITAMIN D (CHOLECALCIFEROL) PO      Take 1,000 Units by mouth daily

## 2018-01-16 NOTE — NURSING NOTE
Chief Complaint   Patient presents with     URI       Initial /78 (BP Location: Right arm, Patient Position: Sitting, Cuff Size: Adult Regular)  Pulse 65  Temp 98.3  F (36.8  C) (Oral)  Ht 6' (1.829 m)  Wt 133 lb (60.3 kg)  SpO2 99%  BMI 18.04 kg/m2 Estimated body mass index is 18.04 kg/(m^2) as calculated from the following:    Height as of this encounter: 6' (1.829 m).    Weight as of this encounter: 133 lb (60.3 kg).  Medication Reconciliation: complete   Rhoda Guilford- CMA

## 2018-07-02 NOTE — TELEPHONE ENCOUNTER
"losartan (COZAAR) 100 MG tablet 90 tablet 3 7/18/2017     Last Written Prescription Date:  7/18/17  Last Fill Quantity: 90,  # refills: 3   Last office visit: 1/16/2018 with prescribing provider:  Joel   Future Office Visit:  none    Requested Prescriptions   Pending Prescriptions Disp Refills     losartan (COZAAR) 100 MG tablet [Pharmacy Med Name: Losartan Potassium Oral Tablet 100 MG] 90 tablet 2     Sig: TAKE ONE TABLET BY MOUTH ONE TIME DAILY    Angiotensin-II Receptors Failed    7/1/2018 12:30 PM       Failed - Blood pressure under 140/90 in past 12 months    BP Readings from Last 3 Encounters:   01/16/18 162/78   10/28/17 178/87   08/21/17 155/75                Passed - Recent (12 mo) or future (30 days) visit within the authorizing provider's specialty    Patient had office visit in the last 12 months or has a visit in the next 30 days with authorizing provider or within the authorizing provider's specialty.  See \"Patient Info\" tab in inbasket, or \"Choose Columns\" in Meds & Orders section of the refill encounter.           Passed - Patient is age 18 or older       Passed - Normal serum creatinine on file in past 12 months    Recent Labs   Lab Test  08/21/17   0926   CR  0.86            Passed - Normal serum potassium on file in past 12 months    Recent Labs   Lab Test  08/21/17   0926   POTASSIUM  4.2                    No flowsheet data found.  "

## 2018-07-03 NOTE — TELEPHONE ENCOUNTER
Routing refill request to provider for review/approval because:  BP out of range.  Please authorize if appropriate.  Thanks,  Stephie Hamilton RN

## 2018-07-10 NOTE — PROGRESS NOTES
SUBJECTIVE:   Primo Dorsey is a 78 year old male who presents to clinic today for the following health issues:      Medication Followup of Losartan    Taking Medication as prescribed: yes    Side Effects:  None    Medication Helping Symptoms:  yes           Problem list and histories reviewed & adjusted, as indicated.  Additional history: as documented    Current Outpatient Prescriptions   Medication Sig Dispense Refill     ASPIRIN 81 MG OR TABS 1 tab po QD (Once per day)       fluticasone (FLONASE) 50 MCG/ACT spray Spray 1-2 sprays into both nostrils daily 16 g 1     levalbuterol (XOPENEX HFA) 45 MCG/ACT Inhaler Inhale 2 puffs into the lungs every 6 hours as needed for shortness of breath / dyspnea or wheezing 1 Inhaler 3     nystatin (MYCOSTATIN) 050557 UNIT/ML suspension Take 5 mLs (500,000 Units) by mouth 4 times daily (Patient not taking: Reported on 8/30/2018) 280 mL 0     order for DME Dog walking three times weekly 1 unit marking on an U-100 insulin syringe 11     ADVAIR DISKUS 250-50 MCG/DOSE diskus inhaler Inhale 1 puff into the lungs twice daily 180 Inhaler 0     losartan (COZAAR) 100 MG tablet TAKE ONE TABLET BY MOUTH ONE TIME DAILY  90 tablet 2     simvastatin (ZOCOR) 20 MG tablet TAKE ONE TABLET (20 mg) BY MOUTH DAILY AT BEDTIME 90 tablet 3       Reviewed and updated as needed this visit by clinical staff  Tobacco  Allergies  Soc Hx      Reviewed and updated as needed this visit by Provider         ROS:  Constitutional, HEENT, cardiovascular, pulmonary, gi and gu systems are negative, except as otherwise noted.    OBJECTIVE:     /73  Pulse 68  Temp 97.1  F (36.2  C) (Oral)  Ht 6' (1.829 m)  Wt 130 lb (59 kg)  SpO2 97%  BMI 17.63 kg/m2  Body mass index is 17.63 kg/(m^2).  GENERAL: healthy, alert and no distress  HEENT posterior pharynx associated with white exudate.  Sample taken for KOH  NECK: no adenopathy, no asymmetry, masses, or scars and thyroid normal to palpation  RESP: lungs  clear to auscultation - no rales, rhonchi or wheezes  CV: regular rate and rhythm, normal S1 S2, no S3 or S4, no murmur, click or rub, no peripheral edema and peripheral pulses strong  ABDOMEN: soft, nontender, no hepatosplenomegaly, no masses and bowel sounds normal  MS: no gross musculoskeletal defects noted, no edema        ASSESSMENT/PLAN:             1. Essential hypertension  Patient's blood pressure is borderline recommend following no added salt diet and keeping close tabs recheck his blood pressure in the office    2. Hyperlipidemia LDL goal <130      3. Other osteoarthritis of spine, cervical region      4. Other emphysema (H)      5. Chronic seasonal allergic rhinitis, unspecified trigger      6. Thrush    - nystatin (MYCOSTATIN) 106187 UNIT/ML suspension; Take 5 mLs (500,000 Units) by mouth 4 times daily (Patient not taking: Reported on 8/30/2018)  Dispense: 280 mL; Refill: 0        Ralph Maldonado MD  [facility]

## 2018-07-10 NOTE — MR AVS SNAPSHOT
"              After Visit Summary   7/10/2018    Primo Dorsey    MRN: [id number]           Patient Information     Date Of Birth          1940        Visit Information        Provider Department      7/10/2018 4:30 PM Ralph Maldonado MD [facility]        Today's Diagnoses     Essential hypertension    -  1    Hyperlipidemia LDL goal <130        Other osteoarthritis of spine, cervical region        Other emphysema (H)        Chronic seasonal allergic rhinitis, unspecified trigger        Thrush           Follow-ups after your visit        Who to contact     If you have questions or need follow up information about today's clinic visit or your schedule please contact [facility] directly at [phone].  Normal or non-critical lab and imaging results will be communicated to you by MyChart, letter or phone within 4 business days after the clinic has received the results. If you do not hear from us within 7 days, please contact the clinic through MyChart or phone. If you have a critical or abnormal lab result, we will notify you by phone as soon as possible.  Submit refill requests through Tistagames or call your pharmacy and they will forward the refill request to us. Please allow 3 business days for your refill to be completed.          Additional Information About Your Visit        MyChart Information     Tistagames lets you send messages to your doctor, view your test results, renew your prescriptions, schedule appointments and more. To sign up, go to [email or web address] . Click on \"Log in\" on the left side of the screen, which will take you to the Welcome page. Then click on \"Sign up Now\" on the right side of the page.     You will be asked to enter the access code listed below, as well as some personal information. Please follow the directions to create your username and password.     Your access code is: [email or web address]  Expires: 2019 11:19 PM     Your access code will  in 90 " days. If you need help or a new code, please call your [facility] clinic or [phone].        Care EveryWhere ID     This is your Care EveryWhere ID. This could be used by other organizations to access your [facility] medical records  [id number]        Your Vitals Were     Pulse Temperature Height Pulse Oximetry BMI (Body Mass Index)       68 97.1  F (36.2  C) (Oral) 6' (1.829 m) 97% 17.63 kg/m2        Blood Pressure from Last 3 Encounters:   08/30/18 130/60   08/20/18 169/78   07/10/18 141/73    Weight from Last 3 Encounters:   08/30/18 128 lb (58.1 kg)   08/20/18 131 lb (59.4 kg)   07/10/18 130 lb (59 kg)              Today, you had the following     No orders found for display         Today's Medication Changes          These changes are accurate as of 7/10/18 11:59 PM.  If you have any questions, ask your nurse or doctor.               Start taking these medicines.        Dose/Directions    nystatin 037847 UNIT/ML suspension   Commonly known as:  MYCOSTATIN   Used for:  Thrush   Started by:  Ralph Maldonado MD        Dose:  457661 Units   Take 5 mLs (500,000 Units) by mouth 4 times daily   Quantity:  280 mL   Refills:  0            Where to get your medicines      These medications were sent [id number] - [facility] [address]  [address] [facility] [address]     Phone:  [phone]     nystatin 962345 UNIT/ML suspension                Primary Care Provider Office Phone # Fax #    Ralph Maldonado [phone] [address] [facility] [address]  [facility] [address]        Equal Access to Services     [facility] : Jose Antonio Heller, aleisha carter, eula ortezaltawana maldonado, jose couch. So [facility] [phone].    ATENCIÓN: Si habla español, tiene a dee disposición servicios gratuitos de asistencia lingüística. Llame al [phone].    We comply with applicable federal civil rights laws and [place] laws. We do not  discriminate on the basis of race, color, national origin, age, disability, sex, sexual orientation, or gender identity.            Thank you!     Thank you for choosing [facility]  for your care. Our goal is always to provide you with excellent care. Hearing back from our patients is one way we can continue to improve our services. Please take a few minutes to complete the written survey that you may receive in the mail after your visit with us. Thank you!             Your Updated Medication List - Protect others around you: Learn how to safely use, store and throw away your medicines at www.disposemymeds.org.          This list is accurate as of 7/10/18 11:59 PM.  Always use your most recent med list.                   Brand Name Dispense Instructions for use Diagnosis    aspirin 81 MG tablet      1 tab po QD (Once per day)        FLONASE 50 MCG/ACT spray   Generic drug:  fluticasone     16 g    Spray 1-2 sprays into both nostrils daily        levalbuterol 45 MCG/ACT Inhaler    XOPENEX HFA    1 Inhaler    Inhale 2 puffs into the lungs every 6 hours as needed for shortness of breath / dyspnea or wheezing    COPD exacerbation (H)       nystatin 392808 UNIT/ML suspension    MYCOSTATIN    280 mL    Take 5 mLs (500,000 Units) by mouth 4 times daily    Thrush       order for DME     1 unit marking on an U-100 insulin syringe    Dog walking three times weekly    Other emphysema (H)

## 2018-08-17 NOTE — PROGRESS NOTES
SUBJECTIVE:   Primo Dorsey is a 78 year old male who presents for Preventive Visit.  Concerned that the inhalers have caused him to have thrush and that it is compromising his breathing.  Nystatin causes taste problem and he believes it caused him to have a rash on his leg    Neuropathy in his feet    Are you in the first 12 months of your Medicare Part B coverage?  No    Healthy Habits:    Do you get at least three servings of calcium containing foods daily (dairy, green leafy vegetables, etc.)? yes    Amount of exercise or daily activities, outside of work: 7 day(s) per week    Problems taking medications regularly No    Medication side effects: No    Have you had an eye exam in the past two years? yes    Do you see a dentist twice per year? yes    Do you have sleep apnea, excessive snoring or daytime drowsiness?no      Ability to successfully perform activities of daily living: Yes, no assistance needed    Home safety:  none identified     Hearing impairment: Yes,     Fall risk:           COGNITIVE SCREEN  1) Repeat 3 items (Leader, Season, Table)    2) Clock draw:   3) 3 item recall:   Results: 3 items recalled: COGNITIVE IMPAIRMENT LESS LIKELY    Mini-CogTM Copyright S Iva. Licensed by the author for use in [facility]; reprinted with permission (soob@[facility]). All rights reserved.                Reviewed and updated as needed this visit by clinical staff         Reviewed and updated as needed this visit by Provider        Social History   Substance Use Topics     Smoking status: Current Every Day Smoker     Packs/day: 0.75     Years: 45.00     Types: Cigarettes     Smokeless tobacco: Never Used      Comment: 1 pack will last 2-3 days     Alcohol use No      Comment: not for 10 years       If you drink alcohol do you typically have >3 drinks per day or >7 drinks per week? No                        Today's PHQ-2 Score:   PHQ-2 ( 1999 [employer]) 7/10/2018 8/21/2017   Q1: Little interest or  pleasure in doing things 0 0   Q2: Feeling down, depressed or hopeless 0 0   PHQ-2 Score 0 0       Do you feel safe in your environment - Yes    Do you have a Health Care Directive?: Yes: Patient states has Advance Directive and will bring in a copy to clinic.    Current providers sharing in care for this patient include:   Patient Care Team:  Ralph Maldonado MD as PCP - General (Internal Medicine)    The following health maintenance items are reviewed in Epic and correct as of today:  Health Maintenance   Topic Date Due     COPD ACTION PLAN Q1 YR  1940     INFLUENZA VACCINE (1) 09/01/2018     FALL RISK ASSESSMENT  07/10/2019     PHQ-2 Q1 YR  07/10/2019     ADVANCE DIRECTIVE PLANNING Q5 YRS  08/03/2021     LIPID SCREEN Q5 YR MALE (SYSTEM ASSIGNED)  08/21/2022     TETANUS IMMUNIZATION (SYSTEM ASSIGNED)  06/20/2023     SPIROMETRY ONETIME  Completed     PNEUMOCOCCAL  Completed     Current Outpatient Prescriptions   Medication Sig Dispense Refill     ASPIRIN 81 MG OR TABS 1 tab po QD (Once per day)       fluticasone (FLONASE) 50 MCG/ACT spray Spray 1-2 sprays into both nostrils daily 16 g 1     levalbuterol (XOPENEX HFA) 45 MCG/ACT Inhaler Inhale 2 puffs into the lungs every 6 hours as needed for shortness of breath / dyspnea or wheezing 1 Inhaler 3     order for DME Dog walking three times weekly 1 unit marking on an U-100 insulin syringe 11     ADVAIR DISKUS 250-50 MCG/DOSE diskus inhaler Inhale 1 puff into the lungs twice daily 180 Inhaler 0     losartan (COZAAR) 100 MG tablet TAKE ONE TABLET BY MOUTH ONE TIME DAILY  90 tablet 2     nystatin (MYCOSTATIN) 132587 UNIT/ML suspension Take 5 mLs (500,000 Units) by mouth 4 times daily (Patient not taking: Reported on 8/30/2018) 280 mL 0     simvastatin (ZOCOR) 20 MG tablet TAKE ONE TABLET (20 mg) BY MOUTH DAILY AT BEDTIME 90 tablet 3           ROS:  CONSTITUTIONAL: NEGATIVE for fever, chills, change in weight  INTEGUMENTARY/SKIN: NEGATIVE for worrisome rashes, moles  or lesions  EYES: NEGATIVE for vision changes or irritation  ENT/MOUTH: NEGATIVE for ear, mouth and throat problems complains of chronic hearing issues  RESP: NEGATIVE for significant cough or SOB  BREAST: NEGATIVE for masses, tenderness or discharge  CV: NEGATIVE for chest pain, palpitations or peripheral edema walks 5-6000 steps per day  GI: NEGATIVE for nausea, abdominal pain, heartburn, or change in bowel habits however he complains of chronic constipation  : NEGATIVE for frequency, dysuria, or hematuria nocturia x4  MUSCULOSKELETAL: NEGATIVE for significant arthralgias or myalgia however he has chronic neck pain  NEURO: NEGATIVE for weakness, dizziness or paresthesias  ENDOCRINE: NEGATIVE for temperature intolerance, skin/hair changes  HEME: NEGATIVE for bleeding problems  PSYCHIATRIC: NEGATIVE for changes in mood or affect    OBJECTIVE:   There were no vitals taken for this visit. Estimated body mass index is 17.63 kg/(m^2) as calculated from the following:    Height as of 7/10/18: 6' (1.829 m).    Weight as of 7/10/18: 130 lb (59 kg).   EXAM: /78 (BP Location: Left arm, Patient Position: Sitting, Cuff Size: Adult Regular)  Pulse 71  Temp 97.7  F (36.5  C) (Oral)  Ht 6' (1.829 m)  Wt 131 lb (59.4 kg)  SpO2 98%  BMI 17.77 kg/m2    GENERAL: healthy, alert and no distress  EYES: Eyes grossly normal to inspection, PERRL and conjunctivae and sclerae normal  HENT: ear canals and TM's normal, nose and mouth without ulcers or lesions  NECK: no adenopathy, no asymmetry, masses, or scars and thyroid normal to palpation  RESP: lungs clear to auscultation - no rales, rhonchi or wheezes  CV: regular rate and rhythm, normal S1 S2, no S3 or S4, no murmur, click or rub, no peripheral edema and peripheral pulses strong  ABDOMEN: soft, nontender, no hepatosplenomegaly, no masses and bowel sounds normal  MS: no gross musculoskeletal defects noted, no edema  SKIN: no suspicious lesions or rashes  NEURO: Normal  strength and tone, mentation intact and speech normal  PSYCH: mentation appears normal, affect normal/bright        ASSESSMENT / PLAN:   1. Encounter for routine adult health examination without abnormal findings    - Spirometry, Breathing Capacity: Normal Order, Clinic Performed  - UA reflex to Microscopic and Culture  - CBC with platelets  - Comprehensive metabolic panel  - Lipid panel reflex to direct LDL Fasting  - Prostate spec antigen screen  - Vitamin D Deficiency    2. Essential hypertension  Blood pressure is previously been well controlled need to document normal,m follow-up blood pressure  - UA reflex to Microscopic and Culture  - CBC with platelets  - Comprehensive metabolic panel    3. Other emphysema (H)  -Notable decline in FEV1.  Use Advair regularly and add albuterol regularly  - Spirometry, Breathing Capacity: Normal Order, Clinic Performed    4. Hyperlipidemia LDL goal <130    - Lipid panel reflex to direct LDL Fasting    5. Tobacco use disorder    Reviewed opportunities for cigarette cessation  6. Other osteoarthritis of spine, cervical region      7. Chronic seasonal allergic rhinitis, unspecified trigger      8. Screening for prostate cancer    - Prostate spec antigen screen    9. Vitamin D deficiency    - Vitamin D Deficiency    10. Special screening for malignant neoplasms, colon  - Fecal colorectal cancer screen (FIT); Future    End of Life Planning:  Patient currently has an advanced directive:     COUNSELING:      BP Readings from Last 1 Encounters:   07/10/18 141/73     Estimated body mass index is 17.63 kg/(m^2) as calculated from the following:    Height as of 7/10/18: 6' (1.829 m).    Weight as of 7/10/18: 130 lb (59 kg).           reports that he has been smoking Cigarettes.  He has a 33.75 pack-year smoking history. He has never used smokeless tobacco.      Appropriate preventive services were discussed with this patient, including applicable screening as appropriate for  cardiovascular disease, diabetes, osteopenia/osteoporosis, and glaucoma.  As appropriate for age/gender, discussed screening for colorectal cancer, prostate cancer, breast cancer, and cervical cancer. Checklist reviewing preventive services available has been given to the patient.    Reviewed patients plan of care and provided an AVS. The  saad Tillman meets the Care Plan requirement. This Care Plan has been established and reviewed with the Patient.    Counseling Resources:  ATP IV Guidelines  Pooled Cohorts Equation Calculator  Breast Cancer Risk Calculator  FRAX Risk Assessment  ICSI Preventive Guidelines  Dietary Guidelines for Americans, 2010  USDA's MyPlate  ASA Prophylaxis  Lung CA Screening    Ralph Maldonado MD  [facility]

## 2018-08-20 NOTE — MR AVS SNAPSHOT
After Visit Summary   8/20/2018    Primo Dorsey    MRN: [id number]           Patient Information     Date Of Birth          1940        Visit Information        Provider Department      8/20/2018 8:00 AM Ralph Maldonado MD [facility]        Today's Diagnoses     Encounter for routine adult health examination without abnormal findings    -  1    Essential hypertension        Other emphysema (H)        Hyperlipidemia LDL goal <130        Tobacco use disorder        Other osteoarthritis of spine, cervical region        Chronic seasonal allergic rhinitis, unspecified trigger        Screening for prostate cancer        Vitamin D deficiency        Special screening for malignant neoplasms, colon          Care Instructions      Preventive Health Recommendations:       Male Ages 65 and over    Yearly exam:             See your health care provider every year in order to  o   Review health changes.   o   Discuss preventive care.    o   Review your medicines if your doctor has prescribed any.    Talk with your health care provider about whether you should have a test to screen for prostate cancer (PSA).    Every 3 years, have a diabetes test (fasting glucose). If you are at risk for diabetes, you should have this test more often.    Every 5 years, have a cholesterol test. Have this test more often if you are at risk for high cholesterol or heart disease.     Every 10 years, have a colonoscopy. Or, have a yearly FIT test (stool test). These exams will check for colon cancer.    Talk to with your health care provider about screening for Abdominal Aortic Aneurysm if you have a family history of AAA or have a history of smoking.  Shots:     Get a flu shot each year.     Get a tetanus shot every 10 years.     Talk to your doctor about your pneumonia vaccines. There are now two you should receive - Pneumovax (PPSV 23) and Prevnar (PCV 13).    Talk to your pharmacist about a shingles vaccine.  "    Talk to your doctor about the hepatitis B vaccine.  Nutrition:     Eat at least 5 servings of fruits and vegetables each day.     Eat whole-grain bread, whole-wheat pasta and brown rice instead of white grains and rice.     Get adequate Calcium and Vitamin D.   Lifestyle    Exercise for at least 150 minutes a week (30 minutes a day, 5 days a week). This will help you control your weight and prevent disease.     Limit alcohol to one drink per day.     No smoking.     Wear sunscreen to prevent skin cancer.     See your dentist every six months for an exam and cleaning.     See your eye doctor every 1 to 2 years to screen for conditions such as glaucoma, macular degeneration and cataracts.          Follow-ups after your visit        Who to contact     If you have questions or need follow up information about today's clinic visit or your schedule please contact [facility] directly at [phone].  Normal or non-critical lab and imaging results will be communicated to you by MyChart, letter or phone within 4 business days after the clinic has received the results. If you do not hear from us within 7 days, please contact the clinic through VODECLIChart or phone. If you have a critical or abnormal lab result, we will notify you by phone as soon as possible.  Submit refill requests through U-NOTE or call your pharmacy and they will forward the refill request to us. Please allow 3 business days for your refill to be completed.          Additional Information About Your Visit        VODECLIChart Information     U-NOTE lets you send messages to your doctor, view your test results, renew your prescriptions, schedule appointments and more. To sign up, go to [email or web address] . Click on \"Log in\" on the left side of the screen, which will take you to the Welcome page. Then click on \"Sign up Now\" on the right side of the page.     You will be asked to enter the access code listed below, as well as some personal " information. Please follow the directions to create your username and password.     Your access code is: [email or web address]  Expires: 2019 10:19 PM     Your access code will  in 90 days. If you need help or a new code, please call your [facility] clinic or [phone].        Care EveryWhere ID     This is your Care EveryWhere ID. This could be used by other organizations to access your [facility] medical records  [id number]        Your Vitals Were     Pulse Temperature Height Pulse Oximetry BMI (Body Mass Index)       71 97.7  F (36.5  C) (Oral) 6' (1.829 m) 98% 17.77 kg/m2        Blood Pressure from Last 3 Encounters:   18 130/60   18 169/78   07/10/18 141/73    Weight from Last 3 Encounters:   18 128 lb (58.1 kg)   18 131 lb (59.4 kg)   07/10/18 130 lb (59 kg)              We Performed the Following     CBC with platelets     Comprehensive metabolic panel     Lipid panel reflex to direct LDL Fasting     Prostate spec antigen screen     Spirometry, Breathing Capacity: Normal Order, Clinic Performed     UA reflex to Microscopic and Culture     Vitamin D Deficiency        Primary Care Provider Office Phone # Fax #    Ralph Maldonado [phone] [address] [address]        Equal Access to Services     [facility] : Hadii aad ku hadasho Sosharonali, waaxda luqadaha, qaybta kaalmada erica, jose stoner . So [facility] [phone].    ATENCIÓN: Si habla español, tiene a dee disposición servicios gratuitos de asistencia lingüística. Llame al [phone].    We comply with applicable federal civil rights laws and [place] laws. We do not discriminate on the basis of race, color, national origin, age, disability, sex, sexual orientation, or gender identity.            Thank you!     Thank you for choosing [facility]  for your care. Our goal is always to provide you with excellent care. Hearing back from our  patients is one way we can continue to improve our services. Please take a few minutes to complete the written survey that you may receive in the mail after your visit with us. Thank you!             Your Updated Medication List - Protect others around you: Learn how to safely use, store and throw away your medicines at www.disposemymeds.org.          This list is accurate as of 8/20/18 11:59 PM.  Always use your most recent med list.                   Brand Name Dispense Instructions for use Diagnosis    aspirin 81 MG tablet      1 tab po QD (Once per day)        FLONASE 50 MCG/ACT spray   Generic drug:  fluticasone     16 g    Spray 1-2 sprays into both nostrils daily        levalbuterol 45 MCG/ACT Inhaler    XOPENEX HFA    1 Inhaler    Inhale 2 puffs into the lungs every 6 hours as needed for shortness of breath / dyspnea or wheezing    COPD exacerbation (H)       nystatin 186763 UNIT/ML suspension    MYCOSTATIN    280 mL    Take 5 mLs (500,000 Units) by mouth 4 times daily    Thrush       order for DME     1 unit marking on an U-100 insulin syringe    Dog walking three times weekly    Other emphysema (H)

## 2018-08-27 NOTE — TELEPHONE ENCOUNTER
"  fluticasone-salmeterol (ADVAIR) 250-50 MCG/DOSE diskus inhaler 3 Inhaler 3 7/27/2017         Last Written Prescription Date:  07/27/2017  Last Fill Quantity: 3,  # refills: 3   Last office visit: 8/20/2018 with prescribing provider:     Future Office Visit: 08/30/2018  Next 5 appointments (look out 90 days)     Aug 30, 2018  8:00 AM CDT   [facility]donado [facility]   [facility]    [address] [address]   [phone]                 Requested Prescriptions   Pending Prescriptions Disp Refills     ADVAIR DISKUS 250-50 MCG/DOSE diskus inhaler [Pharmacy Med Name: Advair Diskus Inhalation Aerosol Powder Breath Activated 250-50 MCG/DOSE]  2     Sig: Inhale 1 puff into the lungs twice daily    Inhaled Steroids Protocol Passed    8/26/2018 12:20 PM       Passed - Patient is age 12 or older       Passed - Recent (12 mo) or future (30 days) visit within the authorizing provider's specialty    Patient had office visit in the last 12 months or has a visit in the next 30 days with authorizing provider or within the authorizing provider's specialty.  See \"Patient Info\" tab in inbasket, or \"Choose Columns\" in Meds & Orders section of the refill encounter.              "

## 2018-08-28 NOTE — TELEPHONE ENCOUNTER
Pres[place] [facility] [place]  Dinora Falcon RN

## 2018-08-30 NOTE — PATIENT INSTRUCTIONS
Smoking Cessation  Set a quit date.  Begin tapering your tobacco use one week prior.    Let people know that you are planning to quit, looking for support/encouragement, and what your triggers are for smoking so that they can help you avoid them.     Make a list of your triggers and some viable alternatives to smoking when you have an urge.     List stress management techniques. New stresses will occur during this period of time. Having techniques to deal with this is important.    Counseling and support is available from the tobacco settlement fund through [employer].  They have coaches and phone counselors available.     [employer]  [email or web address]

## 2018-08-30 NOTE — MR AVS SNAPSHOT
After Visit Summary   8/30/2018    Primo Dorsey    MRN: [id number]           Patient Information     Date Of Birth          1940        Visit Information        Provider Department      8/30/2018 8:00 AM Ralph Maldonado MD [facility]        Today's Diagnoses     Essential hypertension    -  1    Hyperlipidemia LDL goal <130        Tobacco use disorder        Bilateral inguinal hernia without obstruction or gangrene, recurrence not specified        Other osteoarthritis of spine, cervical region        Other emphysema (H)        Chronic seasonal allergic rhinitis, unspecified trigger        Special screening for malignant neoplasms, colon          Care Instructions    Smoking Cessation  Set a quit date.  Begin tapering your tobacco use one week prior.    Let people know that you are planning to quit, looking for support/encouragement, and what your triggers are for smoking so that they can help you avoid them.     Make a list of your triggers and some viable alternatives to smoking when you have an urge.     List stress management techniques. New stresses will occur during this period of time. Having techniques to deal with this is important.    Counseling and support is available from the tobacco settlement fund through [employer].  They have coaches and phone counselors available.     [employer]  [email or web address]            Follow-ups after your visit        Future tests that were ordered for you today     Open Future Orders        Priority Expected Expires Ordered    Fecal colorectal cancer screen (FIT) Routine 9/20/2018 11/22/2018 8/30/2018            Who to contact     If you have questions or need follow up information about today's clinic visit or your schedule please contact [facility] directly at [phone].  Normal or non-critical lab and imaging results will be communicated to you by MyChart, letter or phone within 4 business days after the clinic has  received the results. If you do not hear from us within 7 days, please contact the clinic through [employer] or phone. If you have a critical or abnormal lab result, we will notify you by phone as soon as possible.  Submit refill requests through [employer] or call your pharmacy and they will forward the refill request to us. Please allow 3 business days for your refill to be completed.          Additional Information About Your Visit        Care EveryWhere ID     This is your Care EveryWhere ID. This could be used by other organizations to access your [facility] medical records  [id number]        Your Vitals Were     Pulse Temperature Height Pulse Oximetry BMI (Body Mass Index)       76 97.2  F (36.2  C) (Oral) 6' (1.829 m) 99% 17.36 kg/m2        Blood Pressure from Last 3 Encounters:   08/30/18 130/60   08/20/18 169/78   07/10/18 141/73    Weight from Last 3 Encounters:   08/30/18 128 lb (58.1 kg)   08/20/18 131 lb (59.4 kg)   07/10/18 130 lb (59 kg)               Primary Care Provider Office Phone # Fax #    Ralph Maldonado [phone] [address] [address]        Equal Access to Services     JACKSON CARRILLO : Hadii aad ku hadasho Soomaali, waaxda luqadaha, qaybta kaalmada adeegyada, waxay rah couch. So [facility] [phone].    ATENCIÓN: Si habla español, tiene a dee disposición servicios gratuitos de asistencia lingüística. alessia al [phone].    We comply with applicable federal civil rights laws and [place] laws. We do not discriminate on the basis of race, color, national origin, age, disability, sex, sexual orientation, or gender identity.            Thank you!     Thank you for choosing [facility]  for your care. Our goal is always to provide you with excellent care. Hearing back from our patients is one way we can continue to improve our services. Please take a few minutes to complete the written survey that you may receive in  the mail after your visit with us. Thank you!             Your Updated Medication List - Protect others around you: Learn how to safely use, store and throw away your medicines at www.disposemymeds.org.          This list is accurate as of 8/30/18 11:59 PM.  Always use your most recent med list.                   Brand Name Dispense Instructions for use Diagnosis    ADVAIR DISKUS 250-50 MCG/DOSE diskus inhaler   Generic drug:  fluticasone-salmeterol     180 Inhaler    Inhale 1 puff into the lungs twice daily    Other emphysema (H)       aspirin 81 MG tablet      1 tab po QD (Once per day)        FLONASE 50 MCG/ACT spray   Generic drug:  fluticasone     16 g    Spray 1-2 sprays into both nostrils daily        levalbuterol 45 MCG/ACT Inhaler    XOPENEX HFA    1 Inhaler    Inhale 2 puffs into the lungs every 6 hours as needed for shortness of breath / dyspnea or wheezing    COPD exacerbation (H)       losartan 100 MG tablet    COZAAR    90 tablet    TAKE ONE TABLET BY MOUTH ONE TIME DAILY    Essential hypertension       nystatin 747412 UNIT/ML suspension    MYCOSTATIN    280 mL    Take 5 mLs (500,000 Units) by mouth 4 times daily    Thrush       order for DME     1 unit marking on an U-100 insulin syringe    Dog walking three times weekly    Other emphysema (H)       simvastatin 20 MG tablet    ZOCOR    90 tablet    Take 1 tablet (20 mg) by mouth At Bedtime    Hyperlipidemia LDL goal <130

## 2018-08-30 NOTE — PROGRESS NOTES
SUBJECTIVE:   Primo Dorsey is a 78 year old male who presents to clinic today for the following health issues:    Hypertension follow-up  He didn't have coffee or cigarettes this morning. He doesn't have a specific exercise routine, and doesn't go on walks at this time. His main activity is what he gets in at work. Currently taking only Losartan for high blood pressure.     COPD  Using Advair inhaler regularly, not noticing any changes in his breathing. He doesn't use the levalbuterol inhaler very often, but makes sure to keep one on him throughout the day. He is a current smoker, and has thought about quitting in the past but has been unable to. He smokes less when the weather gets cold as he doesn't smoke in his house.     Low Vitamin D  Vitamin D level was checked recently. He was taking a vitamin D supplement in the past, but stopped taking it because he stopped it along with a lot of other supplements after he had some abnormal GI symptoms.     Nicotine abuse    Reviewed cigarette usage and effect on his COPD and other health hazards patient agreeable and anticipates stopping cold turkey.  Reviewed adjunctive opportunities available for cigarette cessation.    Problem list and histories reviewed & adjusted, as indicated.  Additional history: as documented    Current Outpatient Prescriptions   Medication Sig Dispense Refill     ADVAIR DISKUS 250-50 MCG/DOSE diskus inhaler Inhale 1 puff into the lungs twice daily 180 Inhaler 0     ASPIRIN 81 MG OR TABS 1 tab po QD (Once per day)       fluticasone (FLONASE) 50 MCG/ACT spray Spray 1-2 sprays into both nostrils daily 16 g 1     levalbuterol (XOPENEX HFA) 45 MCG/ACT Inhaler Inhale 2 puffs into the lungs every 6 hours as needed for shortness of breath / dyspnea or wheezing 1 Inhaler 3     losartan (COZAAR) 100 MG tablet TAKE ONE TABLET BY MOUTH ONE TIME DAILY  90 tablet 0     order for DME Dog walking three times weekly 1 unit marking on an U-100 insulin syringe  11     simvastatin (ZOCOR) 20 MG tablet Take 1 tablet (20 mg) by mouth At Bedtime 90 tablet 3     nystatin (MYCOSTATIN) 954876 UNIT/ML suspension Take 5 mLs (500,000 Units) by mouth 4 times daily (Patient not taking: Reported on 8/30/2018) 280 mL 0     Allergies   Allergen Reactions     No Known Drug Allergies        Reviewed and updated as needed this visit by clinical staff       Reviewed and updated as needed this visit by Provider       ROS:  Constitutional, HEENT, cardiovascular, pulmonary, gi and gu systems are negative, except as otherwise noted.    This document serves as a record of the services and decisions personally performed and made by Ralph Maldonado MD. It was created on his behalf by Jade Alexander, a trained medical scribe. The creation of this document is based on the provider's statements to the medical scribe.  Jade Alexander August 30, 2018 8:16 AM    OBJECTIVE:     /77 (BP Location: Left arm, Cuff Size: Adult Regular)  Pulse 76  Temp 97.2  F (36.2  C) (Oral)  Ht 1.829 m (6')  Wt 58.1 kg (128 lb)  SpO2 99%  BMI 17.36 kg/m2  Body mass index is 17.36 kg/(m^2).   /60  GENERAL: healthy, alert and no distress, underweight  EYES: Eyes grossly normal to inspection, PERRL, EOMI, sclerae white and conjunctivae normal  MS: no gross musculoskeletal defects noted, no edema  SKIN: no suspicious lesions or rashes  PSYCH: mentation appears normal, affect normal/bright    Diagnostic Test Results:  No results found for this or any previous visit (from the past 24 hour(s)).    ASSESSMENT/PLAN:     1. Essential hypertension  Improved on recheck. No changes to medication at this time.     2. Hyperlipidemia LDL goal <130  Reviewed reports with patient. Lipid panel looked good, as did PSA, CBC, UA all normal.    3. Tobacco use disorder  Discussed cigarette cessation. Advised pt on benefits of smoking cessation and discussed potential use of counseling and pharmacotherapies.    4. Bilateral inguinal hernia  without obstruction or gangrene, recurrence not specified    5. Other osteoarthritis of spine, cervical region    6. Other emphysema (H)  Reviewed spirometry tests with patient. Based on his results, recommended he increase his albuterol use throughout the day, can use up to 4 times in one day if needed.     7. Chronic seasonal allergic rhinitis, unspecified trigger  Vitamin D level was low on check. Will need to start a Vitamin D supplement, 1000 international units recommended    8. Special screening for malignant neoplasms, colon  Patient unable to complete last FIT test, requests a new kit today.   - Fecal colorectal cancer screen (FIT); Future    Length of visit was 15 minutes with more than 50 percent of that time used for discussing medical concerns and education    The information in this document, created by the medical scribe for me, accurately reflects the services I personally performed and the decisions made by me. I have reviewed and approved this document for accuracy.     Ralph Maldonado MD  [facility]

## 2018-09-04 NOTE — TELEPHONE ENCOUNTER
"simvastatin (ZOCOR) 20 MG tablet  Last Written Prescription Date:  7/18/2017  Last Fill Quantity: 90,  # refills: 3   Last office visit: 8/30/2018 with prescribing provider:  Joel   Future Office Visit:      Requested Prescriptions   Pending Prescriptions Disp Refills     simvastatin (ZOCOR) 20 MG tablet [Pharmacy Med Name: Simvastatin Oral Tablet 20 MG] 90 tablet 2     Sig: TAKE ONE TABLET (20 mg) BY MOUTH DAILY AT BEDTIME    Statins Protocol Passed    9/2/2018 12:44 PM       Passed - LDL on file in past 12 months    Recent Labs   Lab Test  08/20/18   0905   LDL  80            Passed - No abnormal creatine kinase in past 12 months    No lab results found.            Passed - Recent (12 mo) or future (30 days) visit within the authorizing provider's specialty    Patient had office visit in the last 12 months or has a visit in the next 30 days with authorizing provider or within the authorizing provider's specialty.  See \"Patient Info\" tab in inbasket, or \"Choose Columns\" in Meds & Orders section of the refill encounter.           Passed - Patient is age 18 or older          "

## 2018-09-05 NOTE — TELEPHONE ENCOUNTER
[place] [facility] [place]    Lobito TYSON RN

## 2018-10-02 NOTE — TELEPHONE ENCOUNTER
"Last Written Prescription Date:  7/03/18  Last Fill Quantity: 90 tablet,  # refills: 0   Last office visit: 8/30/2018 with prescribing provider:  Joel   Future Office Visit:      Requested Prescriptions   Pending Prescriptions Disp Refills     losartan (COZAAR) 100 MG tablet [Pharmacy Med Name: Losartan Potassium Oral Tablet 100 MG] 90 tablet 0     Sig: TAKE ONE TABLET BY MOUTH ONE TIME DAILY    Angiotensin-II Receptors Passed    10/2/2018  8:22 AM       Passed - Blood pressure under 140/90 in past 12 months    BP Readings from Last 3 Encounters:   08/30/18 130/60   08/20/18 169/78   07/10/18 141/73                Passed - Recent (12 mo) or future (30 days) visit within the authorizing provider's specialty    Patient had office visit in the last 12 months or has a visit in the next 30 days with authorizing provider or within the authorizing provider's specialty.  See \"Patient Info\" tab in inbasket, or \"Choose Columns\" in Meds & Orders section of the refill encounter.           Passed - Patient is age 18 or older       Passed - Normal serum creatinine on file in past 12 months    Recent Labs   Lab Test  08/20/18   0905   CR  0.84            Passed - Normal serum potassium on file in past 12 months    Recent Labs   Lab Test  08/20/18   0905   POTASSIUM  4.0                      "

## 2018-10-03 NOTE — TELEPHONE ENCOUNTER
Pres[place] approved per [facility] [place].  Arleen TYSON RN

## 2018-11-19 NOTE — TELEPHONE ENCOUNTER
[place] [facility] [place]  Arleen TYSON RN

## 2018-11-19 NOTE — TELEPHONE ENCOUNTER
"Last Written Prescription Date:  8/28/18  Last Fill Quantity: 180,  # refills: 0   Last office visit: 8/30/2018 with prescribing provider:     Future Office Visit:    Requested Prescriptions   Pending Prescriptions Disp Refills     ADVAIR DISKUS 250-50 MCG/DOSE diskus inhaler [Pharmacy Med Name: Advair Diskus Inhalation Aerosol Powder Breath Activated 250-50 MCG/DOSE]  0     Sig: INHALE ONE PUFF BY MOUTH TWICE DAILY    Inhaled Steroids Protocol Passed    11/18/2018 12:41 PM       Passed - Patient is age 12 or older       Passed - Recent (12 mo) or future (30 days) visit within the authorizing provider's specialty    Patient had office visit in the last 12 months or has a visit in the next 30 days with authorizing provider or within the authorizing provider's specialty.  See \"Patient Info\" tab in inbasket, or \"Choose Columns\" in Meds & Orders section of the refill encounter.                "

## 2018-12-04 NOTE — TELEPHONE ENCOUNTER
"Last Written Prescription Date:  11/28/18  Last Fill Quantity: 1,  # refills: 3   Last office visit: 8/30/2018 with prescribing provider:     Future Office Visit:    Requested Prescriptions   Pending Prescriptions Disp Refills     XOPENEX HFA 45 MCG/ACT inhaler [Pharmacy Med Name: Xopenex HFA Inhalation Aerosol 45 MCG/ACT] 15 g 2     Sig: INHALE TWO PUFFS into the lungs EVERY SIX HOURS AS NEEDED for shortness of breath/dyspnea or wheezing    Short-Acting Beta Agonist Inhalers Protocol  Passed    12/2/2018  1:19 PM       Passed - Patient is age 12 or older       Passed - Recent (12 mo) or future (30 days) visit within the authorizing provider's specialty    Patient had office visit in the last 12 months or has a visit in the next 30 days with authorizing provider or within the authorizing provider's specialty.  See \"Patient Info\" tab in inbasket, or \"Choose Columns\" in Meds & Orders section of the refill encounter.                "

## 2018-12-05 NOTE — TELEPHONE ENCOUNTER
Prescription approved per [facility] Refill Protocol.  Arleen TYSON RN

## 2019-01-01 ENCOUNTER — HOSPITAL ENCOUNTER (EMERGENCY)
Facility: CLINIC | Age: 79
Discharge: HOME | End: 2019-01-10
Attending: EMERGENCY MEDICINE | Admitting: EMERGENCY MEDICINE
Payer: COMMERCIAL

## 2019-01-01 ENCOUNTER — APPOINTMENT (OUTPATIENT)
Dept: CT IMAGING | Facility: CLINIC | Age: 79
End: 2019-01-01
Attending: EMERGENCY MEDICINE
Payer: COMMERCIAL

## 2019-01-01 ENCOUNTER — APPOINTMENT (OUTPATIENT)
Dept: CT IMAGING | Facility: CLINIC | Age: 79
End: 2019-01-01
Payer: COMMERCIAL

## 2019-01-01 ENCOUNTER — NURSE TRIAGE (OUTPATIENT)
Dept: NURSING | Facility: CLINIC | Age: 79
End: 2019-01-01

## 2019-01-01 ENCOUNTER — APPOINTMENT (OUTPATIENT)
Dept: CARDIOLOGY | Facility: CLINIC | Age: 79
End: 2019-01-01
Attending: INTERNAL MEDICINE
Payer: COMMERCIAL

## 2019-01-01 ENCOUNTER — TELEPHONE (OUTPATIENT)
Dept: FAMILY MEDICINE | Facility: CLINIC | Age: 79
End: 2019-01-01

## 2019-01-01 ENCOUNTER — APPOINTMENT (OUTPATIENT)
Dept: MRI IMAGING | Facility: CLINIC | Age: 79
End: 2019-01-01
Attending: EMERGENCY MEDICINE
Payer: COMMERCIAL

## 2019-01-01 ENCOUNTER — TRANSFERRED RECORDS (OUTPATIENT)
Dept: HEALTH INFORMATION MANAGEMENT | Facility: CLINIC | Age: 79
End: 2019-01-01

## 2019-01-01 ENCOUNTER — OFFICE VISIT (OUTPATIENT)
Dept: FAMILY MEDICINE | Facility: CLINIC | Age: 79
End: 2019-01-01
Payer: COMMERCIAL

## 2019-01-01 ENCOUNTER — PATIENT OUTREACH (OUTPATIENT)
Dept: CARE COORDINATION | Facility: CLINIC | Age: 79
End: 2019-01-01

## 2019-01-01 ENCOUNTER — OFFICE VISIT (OUTPATIENT)
Dept: URGENT CARE | Facility: URGENT CARE | Age: 79
End: 2019-01-01
Payer: COMMERCIAL

## 2019-01-01 ENCOUNTER — APPOINTMENT (OUTPATIENT)
Dept: OCCUPATIONAL THERAPY | Facility: CLINIC | Age: 79
End: 2019-01-01
Attending: INTERNAL MEDICINE
Payer: COMMERCIAL

## 2019-01-01 ENCOUNTER — APPOINTMENT (OUTPATIENT)
Dept: CARDIOLOGY | Facility: CLINIC | Age: 79
End: 2019-01-01
Payer: COMMERCIAL

## 2019-01-01 ENCOUNTER — DOCUMENTATION ONLY (OUTPATIENT)
Dept: OTHER | Facility: CLINIC | Age: 79
End: 2019-01-01

## 2019-01-01 ENCOUNTER — HOSPITAL ENCOUNTER (EMERGENCY)
Facility: CLINIC | Age: 79
Discharge: HOME | End: 2019-03-30
Attending: EMERGENCY MEDICINE | Admitting: EMERGENCY MEDICINE
Payer: COMMERCIAL

## 2019-01-01 ENCOUNTER — HOSPITAL ENCOUNTER (OUTPATIENT)
Dept: CT IMAGING | Facility: CLINIC | Age: 79
Discharge: HOME | End: 2019-03-08
Attending: INTERNAL MEDICINE | Admitting: INTERNAL MEDICINE
Payer: COMMERCIAL

## 2019-01-01 ENCOUNTER — TELEPHONE (OUTPATIENT)
Dept: CARE COORDINATION | Facility: CLINIC | Age: 79
End: 2019-01-01

## 2019-01-01 ENCOUNTER — HOSPITAL ENCOUNTER (OUTPATIENT)
Facility: CLINIC | Age: 79
Setting detail: OBSERVATION
Discharge: HOME | End: 2019-03-18
Attending: EMERGENCY MEDICINE | Admitting: INTERNAL MEDICINE
Payer: COMMERCIAL

## 2019-01-01 ENCOUNTER — HOSPITAL ENCOUNTER (EMERGENCY)
Facility: CLINIC | Age: 79
Discharge: HOME | End: 2019-03-22
Attending: EMERGENCY MEDICINE | Admitting: EMERGENCY MEDICINE
Payer: COMMERCIAL

## 2019-01-01 ENCOUNTER — APPOINTMENT (OUTPATIENT)
Dept: SPEECH THERAPY | Facility: CLINIC | Age: 79
End: 2019-01-01
Attending: INTERNAL MEDICINE
Payer: COMMERCIAL

## 2019-01-01 ENCOUNTER — APPOINTMENT (OUTPATIENT)
Dept: CT IMAGING | Facility: CLINIC | Age: 79
End: 2019-01-01
Attending: INTERNAL MEDICINE
Payer: COMMERCIAL

## 2019-01-01 VITALS
WEIGHT: 135 LBS | HEIGHT: 71 IN | OXYGEN SATURATION: 94 % | TEMPERATURE: 98.2 F | BODY MASS INDEX: 18.9 KG/M2 | DIASTOLIC BLOOD PRESSURE: 96 MMHG | RESPIRATION RATE: 18 BRPM | SYSTOLIC BLOOD PRESSURE: 151 MMHG | HEART RATE: 87 BPM

## 2019-01-01 VITALS
HEART RATE: 84 BPM | BODY MASS INDEX: 18.23 KG/M2 | HEIGHT: 71 IN | SYSTOLIC BLOOD PRESSURE: 188 MMHG | OXYGEN SATURATION: 95 % | TEMPERATURE: 96.4 F | WEIGHT: 130.2 LBS | DIASTOLIC BLOOD PRESSURE: 83 MMHG

## 2019-01-01 VITALS
HEART RATE: 62 BPM | WEIGHT: 125.66 LBS | HEIGHT: 72 IN | OXYGEN SATURATION: 97 % | BODY MASS INDEX: 17.02 KG/M2 | RESPIRATION RATE: 17 BRPM | TEMPERATURE: 98 F | SYSTOLIC BLOOD PRESSURE: 140 MMHG | DIASTOLIC BLOOD PRESSURE: 71 MMHG

## 2019-01-01 VITALS
DIASTOLIC BLOOD PRESSURE: 68 MMHG | HEIGHT: 72 IN | WEIGHT: 128 LBS | HEART RATE: 70 BPM | OXYGEN SATURATION: 98 % | SYSTOLIC BLOOD PRESSURE: 152 MMHG | BODY MASS INDEX: 17.34 KG/M2 | TEMPERATURE: 96.3 F

## 2019-01-01 VITALS
SYSTOLIC BLOOD PRESSURE: 150 MMHG | OXYGEN SATURATION: 96 % | HEART RATE: 81 BPM | DIASTOLIC BLOOD PRESSURE: 80 MMHG | TEMPERATURE: 97.1 F

## 2019-01-01 VITALS
HEIGHT: 71 IN | WEIGHT: 128 LBS | SYSTOLIC BLOOD PRESSURE: 155 MMHG | DIASTOLIC BLOOD PRESSURE: 71 MMHG | BODY MASS INDEX: 17.92 KG/M2 | HEART RATE: 79 BPM | OXYGEN SATURATION: 93 % | TEMPERATURE: 97.8 F

## 2019-01-01 VITALS
SYSTOLIC BLOOD PRESSURE: 178 MMHG | TEMPERATURE: 97.5 F | HEART RATE: 85 BPM | DIASTOLIC BLOOD PRESSURE: 86 MMHG | OXYGEN SATURATION: 96 %

## 2019-01-01 VITALS
HEART RATE: 73 BPM | WEIGHT: 130 LBS | DIASTOLIC BLOOD PRESSURE: 60 MMHG | TEMPERATURE: 96 F | BODY MASS INDEX: 18.2 KG/M2 | OXYGEN SATURATION: 97 % | SYSTOLIC BLOOD PRESSURE: 164 MMHG | HEIGHT: 71 IN

## 2019-01-01 VITALS
SYSTOLIC BLOOD PRESSURE: 172 MMHG | HEIGHT: 72 IN | OXYGEN SATURATION: 96 % | BODY MASS INDEX: 16.66 KG/M2 | TEMPERATURE: 98 F | HEART RATE: 85 BPM | RESPIRATION RATE: 20 BRPM | DIASTOLIC BLOOD PRESSURE: 82 MMHG | WEIGHT: 123 LBS

## 2019-01-01 VITALS
HEART RATE: 69 BPM | OXYGEN SATURATION: 96 % | SYSTOLIC BLOOD PRESSURE: 166 MMHG | HEIGHT: 71 IN | WEIGHT: 129 LBS | DIASTOLIC BLOOD PRESSURE: 75 MMHG | TEMPERATURE: 96.9 F | BODY MASS INDEX: 18.06 KG/M2

## 2019-01-01 VITALS
TEMPERATURE: 97.7 F | BODY MASS INDEX: 16.93 KG/M2 | WEIGHT: 125 LBS | DIASTOLIC BLOOD PRESSURE: 81 MMHG | HEART RATE: 70 BPM | HEIGHT: 72 IN | RESPIRATION RATE: 22 BRPM | SYSTOLIC BLOOD PRESSURE: 144 MMHG | OXYGEN SATURATION: 97 %

## 2019-01-01 VITALS
DIASTOLIC BLOOD PRESSURE: 84 MMHG | HEART RATE: 74 BPM | OXYGEN SATURATION: 97 % | SYSTOLIC BLOOD PRESSURE: 178 MMHG | TEMPERATURE: 97.1 F

## 2019-01-01 DIAGNOSIS — C78.02 MALIGNANT NEOPLASM METASTATIC TO BOTH LUNGS (H): ICD-10-CM

## 2019-01-01 DIAGNOSIS — B37.0 THRUSH: ICD-10-CM

## 2019-01-01 DIAGNOSIS — J44.9 CHRONIC OBSTRUCTIVE PULMONARY DISEASE, UNSPECIFIED COPD TYPE (H): ICD-10-CM

## 2019-01-01 DIAGNOSIS — J44.1 COPD EXACERBATION (H): Primary | ICD-10-CM

## 2019-01-01 DIAGNOSIS — C78.01 MALIGNANT NEOPLASM METASTATIC TO BOTH LUNGS (H): Primary | ICD-10-CM

## 2019-01-01 DIAGNOSIS — R10.84 ABDOMINAL PAIN, GENERALIZED: ICD-10-CM

## 2019-01-01 DIAGNOSIS — E27.9 ADRENAL NODULE (H): ICD-10-CM

## 2019-01-01 DIAGNOSIS — R91.8 LUNG MASS: ICD-10-CM

## 2019-01-01 DIAGNOSIS — F17.200 TOBACCO USE DISORDER: ICD-10-CM

## 2019-01-01 DIAGNOSIS — R93.89 ABNORMAL CT SCAN: ICD-10-CM

## 2019-01-01 DIAGNOSIS — Z86.73 HISTORY OF EMBOLIC STROKE WITHOUT RESIDUAL DEFICITS: Primary | ICD-10-CM

## 2019-01-01 DIAGNOSIS — J44.1 COPD EXACERBATION (H): ICD-10-CM

## 2019-01-01 DIAGNOSIS — R06.09 DOE (DYSPNEA ON EXERTION): ICD-10-CM

## 2019-01-01 DIAGNOSIS — J44.9 CHRONIC OBSTRUCTIVE PULMONARY DISEASE, UNSPECIFIED COPD TYPE (H): Primary | ICD-10-CM

## 2019-01-01 DIAGNOSIS — R47.01 APHASIA: ICD-10-CM

## 2019-01-01 DIAGNOSIS — F41.9 ANXIETY: ICD-10-CM

## 2019-01-01 DIAGNOSIS — J20.9 ACUTE BRONCHITIS, UNSPECIFIED ORGANISM: ICD-10-CM

## 2019-01-01 DIAGNOSIS — I10 ESSENTIAL HYPERTENSION: ICD-10-CM

## 2019-01-01 DIAGNOSIS — H91.90 HEARING LOSS, UNSPECIFIED HEARING LOSS TYPE, UNSPECIFIED LATERALITY: ICD-10-CM

## 2019-01-01 DIAGNOSIS — C34.90 NON-SMALL CELL LUNG CANCER (NSCLC) (H): Primary | ICD-10-CM

## 2019-01-01 DIAGNOSIS — K59.00 CONSTIPATION, UNSPECIFIED CONSTIPATION TYPE: ICD-10-CM

## 2019-01-01 DIAGNOSIS — E78.5 HYPERLIPIDEMIA LDL GOAL <130: ICD-10-CM

## 2019-01-01 DIAGNOSIS — C78.02 MALIGNANT NEOPLASM METASTATIC TO BOTH LUNGS (H): Primary | ICD-10-CM

## 2019-01-01 DIAGNOSIS — I26.99 OTHER ACUTE PULMONARY EMBOLISM WITHOUT ACUTE COR PULMONALE (H): ICD-10-CM

## 2019-01-01 DIAGNOSIS — J01.90 ACUTE SINUSITIS WITH SYMPTOMS > 10 DAYS: Primary | ICD-10-CM

## 2019-01-01 DIAGNOSIS — I10 ESSENTIAL HYPERTENSION: Primary | ICD-10-CM

## 2019-01-01 DIAGNOSIS — Z86.73 HISTORY OF EMBOLIC STROKE WITHOUT RESIDUAL DEFICITS: ICD-10-CM

## 2019-01-01 DIAGNOSIS — R91.8 LUNG MASS: Primary | ICD-10-CM

## 2019-01-01 DIAGNOSIS — R91.8 MASS OF LEFT LUNG: ICD-10-CM

## 2019-01-01 DIAGNOSIS — K63.9 COLONIC THICKENING: ICD-10-CM

## 2019-01-01 DIAGNOSIS — J43.8 OTHER EMPHYSEMA (H): ICD-10-CM

## 2019-01-01 DIAGNOSIS — K59.00 CONSTIPATION, UNSPECIFIED CONSTIPATION TYPE: Primary | ICD-10-CM

## 2019-01-01 DIAGNOSIS — I63.9 CEREBROVASCULAR ACCIDENT (CVA), UNSPECIFIED MECHANISM (H): ICD-10-CM

## 2019-01-01 DIAGNOSIS — C78.01 MALIGNANT NEOPLASM METASTATIC TO BOTH LUNGS (H): ICD-10-CM

## 2019-01-01 DIAGNOSIS — M47.892 OTHER OSTEOARTHRITIS OF SPINE, CERVICAL REGION: ICD-10-CM

## 2019-01-01 LAB
ALBUMIN SERPL-MCNC: 3.2 G/DL (ref 3.4–5)
ALBUMIN SERPL-MCNC: 3.7 G/DL (ref 3.4–5)
ALBUMIN SERPL-MCNC: 3.7 G/DL (ref 3.4–5)
ALBUMIN UR-MCNC: 10 MG/DL
ALBUMIN UR-MCNC: NEGATIVE MG/DL
ALP SERPL-CCNC: 72 U/L (ref 40–150)
ALP SERPL-CCNC: 83 U/L (ref 40–150)
ALP SERPL-CCNC: 87 U/L (ref 40–150)
ALT SERPL W P-5'-P-CCNC: 20 U/L (ref 0–70)
ALT SERPL W P-5'-P-CCNC: 23 U/L (ref 0–70)
ALT SERPL W P-5'-P-CCNC: 26 U/L (ref 0–70)
AMORPH CRY #/AREA URNS HPF: ABNORMAL /HPF
ANION GAP SERPL CALCULATED.3IONS-SCNC: 11 MMOL/L (ref 3–14)
ANION GAP SERPL CALCULATED.3IONS-SCNC: 4 MMOL/L (ref 3–14)
ANION GAP SERPL CALCULATED.3IONS-SCNC: 7 MMOL/L (ref 3–14)
APPEARANCE UR: CLEAR
APPEARANCE UR: CLEAR
AST SERPL W P-5'-P-CCNC: 16 U/L (ref 0–45)
AST SERPL W P-5'-P-CCNC: 24 U/L (ref 0–45)
AST SERPL W P-5'-P-CCNC: 26 U/L (ref 0–45)
BASOPHILS # BLD AUTO: 0 10E9/L (ref 0–0.2)
BASOPHILS # BLD AUTO: 0 10E9/L (ref 0–0.2)
BASOPHILS # BLD AUTO: 0.1 10E9/L (ref 0–0.2)
BASOPHILS NFR BLD AUTO: 0.3 %
BILIRUB DIRECT SERPL-MCNC: 0.1 MG/DL (ref 0–0.2)
BILIRUB SERPL-MCNC: 0.2 MG/DL (ref 0.2–1.3)
BILIRUB SERPL-MCNC: 0.5 MG/DL (ref 0.2–1.3)
BILIRUB SERPL-MCNC: 0.5 MG/DL (ref 0.2–1.3)
BILIRUB UR QL STRIP: NEGATIVE
BILIRUB UR QL STRIP: NEGATIVE
BUN SERPL-MCNC: 17 MG/DL (ref 7–30)
BUN SERPL-MCNC: 17 MG/DL (ref 7–30)
BUN SERPL-MCNC: 20 MG/DL (ref 7–30)
CALCIUM SERPL-MCNC: 8.6 MG/DL (ref 8.5–10.1)
CALCIUM SERPL-MCNC: 8.7 MG/DL (ref 8.5–10.1)
CALCIUM SERPL-MCNC: 9 MG/DL (ref 8.5–10.1)
CHLORIDE SERPL-SCNC: 101 MMOL/L (ref 94–109)
CHLORIDE SERPL-SCNC: 104 MMOL/L (ref 94–109)
CHLORIDE SERPL-SCNC: 99 MMOL/L (ref 94–109)
CHOLEST SERPL-MCNC: 212 MG/DL
CO2 SERPL-SCNC: 23 MMOL/L (ref 20–32)
CO2 SERPL-SCNC: 28 MMOL/L (ref 20–32)
CO2 SERPL-SCNC: 29 MMOL/L (ref 20–32)
COLOR UR AUTO: ABNORMAL
COLOR UR AUTO: YELLOW
COPATH REPORT: NORMAL
CREAT SERPL-MCNC: 0.68 MG/DL (ref 0.66–1.25)
CREAT SERPL-MCNC: 0.69 MG/DL (ref 0.66–1.25)
CREAT SERPL-MCNC: 0.81 MG/DL (ref 0.66–1.25)
D DIMER PPP FEU-MCNC: 9.6 UG/ML FEU (ref 0–0.5)
DIFFERENTIAL METHOD BLD: ABNORMAL
EOSINOPHIL # BLD AUTO: 0.1 10E9/L (ref 0–0.7)
EOSINOPHIL # BLD AUTO: 0.2 10E9/L (ref 0–0.7)
EOSINOPHIL # BLD AUTO: 0.2 10E9/L (ref 0–0.7)
EOSINOPHIL NFR BLD AUTO: 0.5 %
EOSINOPHIL NFR BLD AUTO: 1.5 %
EOSINOPHIL NFR BLD AUTO: 1.7 %
ERYTHROCYTE [DISTWIDTH] IN BLOOD BY AUTOMATED COUNT: 13.4 % (ref 10–15)
ERYTHROCYTE [DISTWIDTH] IN BLOOD BY AUTOMATED COUNT: 13.8 % (ref 10–15)
ERYTHROCYTE [DISTWIDTH] IN BLOOD BY AUTOMATED COUNT: 14 % (ref 10–15)
ERYTHROCYTE [DISTWIDTH] IN BLOOD BY AUTOMATED COUNT: 14.1 % (ref 10–15)
FEF 25/75: NORMAL
FEV-1: NORMAL
FEV1/FVC: NORMAL
FVC: NORMAL
GFR SERPL CREATININE-BSD FRML MDRD: 86 ML/MIN/{1.73_M2}
GFR SERPL CREATININE-BSD FRML MDRD: >90 ML/MIN/{1.73_M2}
GFR SERPL CREATININE-BSD FRML MDRD: >90 ML/MIN/{1.73_M2}
GLUCOSE BLDC GLUCOMTR-MCNC: 125 MG/DL (ref 70–99)
GLUCOSE SERPL-MCNC: 105 MG/DL (ref 70–99)
GLUCOSE SERPL-MCNC: 108 MG/DL (ref 70–99)
GLUCOSE SERPL-MCNC: 118 MG/DL (ref 70–99)
GLUCOSE UR STRIP-MCNC: NEGATIVE MG/DL
GLUCOSE UR STRIP-MCNC: NEGATIVE MG/DL
HBA1C MFR BLD: 6.3 % (ref 0–5.6)
HCT VFR BLD AUTO: 35.5 % (ref 40–53)
HCT VFR BLD AUTO: 38.7 % (ref 40–53)
HCT VFR BLD AUTO: 39.8 % (ref 40–53)
HCT VFR BLD AUTO: 41.5 % (ref 40–53)
HDLC SERPL-MCNC: 81 MG/DL
HGB BLD-MCNC: 11.7 G/DL (ref 13.3–17.7)
HGB BLD-MCNC: 13.1 G/DL (ref 13.3–17.7)
HGB BLD-MCNC: 13.3 G/DL (ref 13.3–17.7)
HGB BLD-MCNC: 13.6 G/DL (ref 13.3–17.7)
HGB UR QL STRIP: NEGATIVE
HGB UR QL STRIP: NEGATIVE
HYALINE CASTS #/AREA URNS LPF: 2 /LPF (ref 0–2)
IMM GRANULOCYTES # BLD: 0 10E9/L (ref 0–0.4)
IMM GRANULOCYTES # BLD: 0.1 10E9/L (ref 0–0.4)
IMM GRANULOCYTES # BLD: 0.1 10E9/L (ref 0–0.4)
IMM GRANULOCYTES NFR BLD: 0.3 %
IMM GRANULOCYTES NFR BLD: 0.3 %
IMM GRANULOCYTES NFR BLD: 0.5 %
INR PPP: 1.12 (ref 0.86–1.14)
INTERPRETATION ECG - MUSE: NORMAL
KETONES UR STRIP-MCNC: NEGATIVE MG/DL
KETONES UR STRIP-MCNC: NEGATIVE MG/DL
LACTATE BLD-SCNC: 0.8 MMOL/L (ref 0.7–2)
LACTATE BLD-SCNC: 0.9 MMOL/L (ref 0.7–2)
LDLC SERPL CALC-MCNC: 83 MG/DL
LEUKOCYTE ESTERASE UR QL STRIP: NEGATIVE
LEUKOCYTE ESTERASE UR QL STRIP: NEGATIVE
LIPASE SERPL-CCNC: 136 U/L (ref 73–393)
LYMPHOCYTES # BLD AUTO: 1 10E9/L (ref 0.8–5.3)
LYMPHOCYTES # BLD AUTO: 1.1 10E9/L (ref 0.8–5.3)
LYMPHOCYTES # BLD AUTO: 1.2 10E9/L (ref 0.8–5.3)
LYMPHOCYTES NFR BLD AUTO: 7.8 %
LYMPHOCYTES NFR BLD AUTO: 7.9 %
LYMPHOCYTES NFR BLD AUTO: 8.5 %
MCH RBC QN AUTO: 28.6 PG (ref 26.5–33)
MCH RBC QN AUTO: 29.3 PG (ref 26.5–33)
MCH RBC QN AUTO: 29.6 PG (ref 26.5–33)
MCH RBC QN AUTO: 30.4 PG (ref 26.5–33)
MCHC RBC AUTO-ENTMCNC: 32.8 G/DL (ref 31.5–36.5)
MCHC RBC AUTO-ENTMCNC: 32.9 G/DL (ref 31.5–36.5)
MCHC RBC AUTO-ENTMCNC: 33 G/DL (ref 31.5–36.5)
MCHC RBC AUTO-ENTMCNC: 34.4 G/DL (ref 31.5–36.5)
MCV RBC AUTO: 87 FL (ref 78–100)
MCV RBC AUTO: 89 FL (ref 78–100)
MCV RBC AUTO: 89 FL (ref 78–100)
MCV RBC AUTO: 90 FL (ref 78–100)
MONOCYTES # BLD AUTO: 1.1 10E9/L (ref 0–1.3)
MONOCYTES # BLD AUTO: 1.4 10E9/L (ref 0–1.3)
MONOCYTES # BLD AUTO: 1.5 10E9/L (ref 0–1.3)
MONOCYTES NFR BLD AUTO: 10.2 %
MONOCYTES NFR BLD AUTO: 9 %
MONOCYTES NFR BLD AUTO: 9.9 %
MUCOUS THREADS #/AREA URNS LPF: PRESENT /LPF
NEUTROPHILS # BLD AUTO: 11.4 10E9/L (ref 1.6–8.3)
NEUTROPHILS # BLD AUTO: 13.1 10E9/L (ref 1.6–8.3)
NEUTROPHILS # BLD AUTO: 9.1 10E9/L (ref 1.6–8.3)
NEUTROPHILS NFR BLD AUTO: 79.3 %
NEUTROPHILS NFR BLD AUTO: 79.6 %
NEUTROPHILS NFR BLD AUTO: 82.1 %
NITRATE UR QL: NEGATIVE
NITRATE UR QL: NEGATIVE
NONHDLC SERPL-MCNC: 131 MG/DL
NRBC # BLD AUTO: 0 10*3/UL
NRBC # BLD AUTO: 0 10*3/UL
NRBC BLD AUTO-RTO: 0 /100
NRBC BLD AUTO-RTO: 0 /100
NT-PROBNP SERPL-MCNC: 381 PG/ML (ref 0–1800)
PH UR STRIP: 6 PH (ref 5–7)
PH UR STRIP: 6.5 PH (ref 5–7)
PLATELET # BLD AUTO: 186 10E9/L (ref 150–450)
PLATELET # BLD AUTO: 207 10E9/L (ref 150–450)
PLATELET # BLD AUTO: 224 10E9/L (ref 150–450)
PLATELET # BLD AUTO: 226 10E9/L (ref 150–450)
POTASSIUM SERPL-SCNC: 3.9 MMOL/L (ref 3.4–5.3)
POTASSIUM SERPL-SCNC: 4.1 MMOL/L (ref 3.4–5.3)
POTASSIUM SERPL-SCNC: 4.3 MMOL/L (ref 3.4–5.3)
PROT SERPL-MCNC: 6.7 G/DL (ref 6.8–8.8)
PROT SERPL-MCNC: 7.5 G/DL (ref 6.8–8.8)
PROT SERPL-MCNC: 7.5 G/DL (ref 6.8–8.8)
RADIOLOGIST FLAGS: ABNORMAL
RBC # BLD AUTO: 3.99 10E12/L (ref 4.4–5.9)
RBC # BLD AUTO: 4.37 10E12/L (ref 4.4–5.9)
RBC # BLD AUTO: 4.43 10E12/L (ref 4.4–5.9)
RBC # BLD AUTO: 4.75 10E12/L (ref 4.4–5.9)
RBC #/AREA URNS AUTO: 1 /HPF (ref 0–2)
RBC #/AREA URNS AUTO: 3 /HPF (ref 0–2)
SODIUM SERPL-SCNC: 134 MMOL/L (ref 133–144)
SODIUM SERPL-SCNC: 135 MMOL/L (ref 133–144)
SODIUM SERPL-SCNC: 137 MMOL/L (ref 133–144)
SOURCE: ABNORMAL
SOURCE: ABNORMAL
SP GR UR STRIP: 1.02 (ref 1–1.03)
SP GR UR STRIP: 1.02 (ref 1–1.03)
TRIGL SERPL-MCNC: 239 MG/DL
TROPONIN I SERPL-MCNC: <0.015 UG/L (ref 0–0.04)
UROBILINOGEN UR STRIP-MCNC: NORMAL MG/DL (ref 0–2)
UROBILINOGEN UR STRIP-MCNC: NORMAL MG/DL (ref 0–2)
WBC # BLD AUTO: 11.4 10E9/L (ref 4–11)
WBC # BLD AUTO: 11.5 10E9/L (ref 4–11)
WBC # BLD AUTO: 14.4 10E9/L (ref 4–11)
WBC # BLD AUTO: 15.9 10E9/L (ref 4–11)
WBC #/AREA URNS AUTO: 1 /HPF (ref 0–5)
WBC #/AREA URNS AUTO: <1 /HPF (ref 0–5)

## 2019-01-01 PROCEDURE — 88360 TUMOR IMMUNOHISTOCHEM/MANUAL: CPT | Performed by: RADIOLOGY

## 2019-01-01 PROCEDURE — 83690 ASSAY OF LIPASE: CPT | Performed by: EMERGENCY MEDICINE

## 2019-01-01 PROCEDURE — 85025 COMPLETE CBC W/AUTO DIFF WBC: CPT | Performed by: EMERGENCY MEDICINE

## 2019-01-01 PROCEDURE — 99214 OFFICE O/P EST MOD 30 MIN: CPT | Performed by: PSYCHIATRY & NEUROLOGY

## 2019-01-01 PROCEDURE — 83880 ASSAY OF NATRIURETIC PEPTIDE: CPT | Performed by: EMERGENCY MEDICINE

## 2019-01-01 PROCEDURE — 25000125 ZZHC RX 250: Performed by: EMERGENCY MEDICINE

## 2019-01-01 PROCEDURE — 99285 EMERGENCY DEPT VISIT HI MDM: CPT | Mod: 25

## 2019-01-01 PROCEDURE — 94010 BREATHING CAPACITY TEST: CPT | Performed by: INTERNAL MEDICINE

## 2019-01-01 PROCEDURE — 25000125 ZZHC RX 250: Performed by: INTERNAL MEDICINE

## 2019-01-01 PROCEDURE — 80061 LIPID PANEL: CPT | Performed by: EMERGENCY MEDICINE

## 2019-01-01 PROCEDURE — 00000159 ZZHCL STATISTIC H-SEND OUTS PREP: Performed by: RADIOLOGY

## 2019-01-01 PROCEDURE — 36415 COLL VENOUS BLD VENIPUNCTURE: CPT | Performed by: INTERNAL MEDICINE

## 2019-01-01 PROCEDURE — 97535 SELF CARE MNGMENT TRAINING: CPT | Mod: GO

## 2019-01-01 PROCEDURE — 99207 ZZC CDG-CODE CATEGORY CHANGED: CPT | Performed by: INTERNAL MEDICINE

## 2019-01-01 PROCEDURE — 93306 TTE W/DOPPLER COMPLETE: CPT | Mod: 26 | Performed by: INTERNAL MEDICINE

## 2019-01-01 PROCEDURE — 40000275 ZZH STATISTIC RCP TIME EA 10 MIN

## 2019-01-01 PROCEDURE — 88173 CYTOPATH EVAL FNA REPORT: CPT | Performed by: RADIOLOGY

## 2019-01-01 PROCEDURE — 80048 BASIC METABOLIC PNL TOTAL CA: CPT | Performed by: EMERGENCY MEDICINE

## 2019-01-01 PROCEDURE — 74177 CT ABD & PELVIS W/CONTRAST: CPT

## 2019-01-01 PROCEDURE — 25000128 H RX IP 250 OP 636: Performed by: EMERGENCY MEDICINE

## 2019-01-01 PROCEDURE — 88342 IMHCHEM/IMCYTCHM 1ST ANTB: CPT | Mod: XU | Performed by: RADIOLOGY

## 2019-01-01 PROCEDURE — 93005 ELECTROCARDIOGRAM TRACING: CPT

## 2019-01-01 PROCEDURE — 25000132 ZZH RX MED GY IP 250 OP 250 PS 637: Performed by: EMERGENCY MEDICINE

## 2019-01-01 PROCEDURE — 88161 CYTOPATH SMEAR OTHER SOURCE: CPT | Mod: 26 | Performed by: RADIOLOGY

## 2019-01-01 PROCEDURE — 83036 HEMOGLOBIN GLYCOSYLATED A1C: CPT | Performed by: EMERGENCY MEDICINE

## 2019-01-01 PROCEDURE — 93325 DOPPLER ECHO COLOR FLOW MAPG: CPT | Mod: 26 | Performed by: INTERNAL MEDICINE

## 2019-01-01 PROCEDURE — 88172 CYTP DX EVAL FNA 1ST EA SITE: CPT | Performed by: RADIOLOGY

## 2019-01-01 PROCEDURE — 25500064 ZZH RX 255 OP 636: Performed by: EMERGENCY MEDICINE

## 2019-01-01 PROCEDURE — 93270 REMOTE 30 DAY ECG REV/REPORT: CPT

## 2019-01-01 PROCEDURE — 96372 THER/PROPH/DIAG INJ SC/IM: CPT

## 2019-01-01 PROCEDURE — 25000128 H RX IP 250 OP 636: Performed by: INTERNAL MEDICINE

## 2019-01-01 PROCEDURE — 88342 IMHCHEM/IMCYTCHM 1ST ANTB: CPT | Mod: 26,59 | Performed by: RADIOLOGY

## 2019-01-01 PROCEDURE — 40000264 ECHOCARDIOGRAM COMPLETE

## 2019-01-01 PROCEDURE — G0378 HOSPITAL OBSERVATION PER HR: HCPCS

## 2019-01-01 PROCEDURE — 94640 AIRWAY INHALATION TREATMENT: CPT | Mod: 76

## 2019-01-01 PROCEDURE — 88161 CYTOPATH SMEAR OTHER SOURCE: CPT | Performed by: RADIOLOGY

## 2019-01-01 PROCEDURE — 83605 ASSAY OF LACTIC ACID: CPT | Performed by: INTERNAL MEDICINE

## 2019-01-01 PROCEDURE — 99213 OFFICE O/P EST LOW 20 MIN: CPT

## 2019-01-01 PROCEDURE — 84484 ASSAY OF TROPONIN QUANT: CPT | Performed by: INTERNAL MEDICINE

## 2019-01-01 PROCEDURE — A9585 GADOBUTROL INJECTION: HCPCS | Performed by: EMERGENCY MEDICINE

## 2019-01-01 PROCEDURE — 99220 ZZC INITIAL OBSERVATION CARE,LEVL III: CPT | Performed by: INTERNAL MEDICINE

## 2019-01-01 PROCEDURE — 25000132 ZZH RX MED GY IP 250 OP 250 PS 637: Performed by: INTERNAL MEDICINE

## 2019-01-01 PROCEDURE — 81001 URINALYSIS AUTO W/SCOPE: CPT | Performed by: EMERGENCY MEDICINE

## 2019-01-01 PROCEDURE — 94640 AIRWAY INHALATION TREATMENT: CPT

## 2019-01-01 PROCEDURE — 99214 OFFICE O/P EST MOD 30 MIN: CPT | Performed by: INTERNAL MEDICINE

## 2019-01-01 PROCEDURE — 71260 CT THORAX DX C+: CPT

## 2019-01-01 PROCEDURE — 88360 TUMOR IMMUNOHISTOCHEM/MANUAL: CPT | Mod: 26 | Performed by: RADIOLOGY

## 2019-01-01 PROCEDURE — 84484 ASSAY OF TROPONIN QUANT: CPT | Performed by: EMERGENCY MEDICINE

## 2019-01-01 PROCEDURE — 80076 HEPATIC FUNCTION PANEL: CPT | Performed by: EMERGENCY MEDICINE

## 2019-01-01 PROCEDURE — 99213 OFFICE O/P EST LOW 20 MIN: CPT | Performed by: PSYCHIATRY & NEUROLOGY

## 2019-01-01 PROCEDURE — 93320 DOPPLER ECHO COMPLETE: CPT | Mod: 26 | Performed by: INTERNAL MEDICINE

## 2019-01-01 PROCEDURE — 88173 CYTOPATH EVAL FNA REPORT: CPT | Mod: 26 | Performed by: RADIOLOGY

## 2019-01-01 PROCEDURE — 99214 OFFICE O/P EST MOD 30 MIN: CPT | Mod: 25 | Performed by: INTERNAL MEDICINE

## 2019-01-01 PROCEDURE — 94640 AIRWAY INHALATION TREATMENT: CPT | Performed by: PHYSICIAN ASSISTANT

## 2019-01-01 PROCEDURE — 93272 ECG/REVIEW INTERPRET ONLY: CPT | Performed by: INTERNAL MEDICINE

## 2019-01-01 PROCEDURE — 88341 IMHCHEM/IMCYTCHM EA ADD ANTB: CPT | Mod: XU | Performed by: RADIOLOGY

## 2019-01-01 PROCEDURE — 88305 TISSUE EXAM BY PATHOLOGIST: CPT | Performed by: RADIOLOGY

## 2019-01-01 PROCEDURE — 99214 OFFICE O/P EST MOD 30 MIN: CPT | Performed by: NURSE PRACTITIONER

## 2019-01-01 PROCEDURE — 93312 ECHO TRANSESOPHAGEAL: CPT | Mod: 26 | Performed by: INTERNAL MEDICINE

## 2019-01-01 PROCEDURE — 97165 OT EVAL LOW COMPLEX 30 MIN: CPT | Mod: GO

## 2019-01-01 PROCEDURE — 49180 BIOPSY ABDOMINAL MASS: CPT

## 2019-01-01 PROCEDURE — 99217 ZZC OBSERVATION CARE DISCHARGE: CPT | Performed by: INTERNAL MEDICINE

## 2019-01-01 PROCEDURE — 40000275 ZZH STATISTIC RCP TIME EA 10 MIN: Mod: 76

## 2019-01-01 PROCEDURE — 85027 COMPLETE CBC AUTOMATED: CPT | Performed by: INTERNAL MEDICINE

## 2019-01-01 PROCEDURE — 25000125 ZZHC RX 250: Performed by: RADIOLOGY

## 2019-01-01 PROCEDURE — 85379 FIBRIN DEGRADATION QUANT: CPT | Performed by: EMERGENCY MEDICINE

## 2019-01-01 PROCEDURE — 80053 COMPREHEN METABOLIC PANEL: CPT | Performed by: EMERGENCY MEDICINE

## 2019-01-01 PROCEDURE — 92610 EVALUATE SWALLOWING FUNCTION: CPT | Mod: GN

## 2019-01-01 PROCEDURE — 96374 THER/PROPH/DIAG INJ IV PUSH: CPT

## 2019-01-01 PROCEDURE — 81445 SO NEO GSAP 5-50DNA/DNA&RNA: CPT | Mod: 59 | Performed by: RADIOLOGY

## 2019-01-01 PROCEDURE — 83605 ASSAY OF LACTIC ACID: CPT | Performed by: EMERGENCY MEDICINE

## 2019-01-01 PROCEDURE — 40000863 ZZH STATISTIC RADIOLOGY XRAY, US, CT, MAR, NM

## 2019-01-01 PROCEDURE — 70544 MR ANGIOGRAPHY HEAD W/O DYE: CPT | Mod: 59

## 2019-01-01 PROCEDURE — 40000857 ZZH STATISTIC TEE INCLUDES SEDATION

## 2019-01-01 PROCEDURE — 99226 ZZC SUBSEQUENT OBSERVATION CARE,LEVEL III: CPT | Performed by: INTERNAL MEDICINE

## 2019-01-01 PROCEDURE — 96361 HYDRATE IV INFUSION ADD-ON: CPT

## 2019-01-01 PROCEDURE — 88341 IMHCHEM/IMCYTCHM EA ADD ANTB: CPT | Mod: 26 | Performed by: RADIOLOGY

## 2019-01-01 PROCEDURE — 99283 EMERGENCY DEPT VISIT LOW MDM: CPT

## 2019-01-01 PROCEDURE — 25000128 H RX IP 250 OP 636: Performed by: RADIOLOGY

## 2019-01-01 PROCEDURE — 70549 MR ANGIOGRAPH NECK W/O&W/DYE: CPT

## 2019-01-01 PROCEDURE — 93325 DOPPLER ECHO COLOR FLOW MAPG: CPT

## 2019-01-01 PROCEDURE — 00000146 ZZHCL STATISTIC GLUCOSE BY METER IP

## 2019-01-01 PROCEDURE — 99215 OFFICE O/P EST HI 40 MIN: CPT | Mod: 25 | Performed by: PHYSICIAN ASSISTANT

## 2019-01-01 PROCEDURE — 88172 CYTP DX EVAL FNA 1ST EA SITE: CPT | Mod: 26 | Performed by: RADIOLOGY

## 2019-01-01 PROCEDURE — 51798 US URINE CAPACITY MEASURE: CPT

## 2019-01-01 PROCEDURE — 99152 MOD SED SAME PHYS/QHP 5/>YRS: CPT

## 2019-01-01 PROCEDURE — 70553 MRI BRAIN STEM W/O & W/DYE: CPT

## 2019-01-01 PROCEDURE — 88305 TISSUE EXAM BY PATHOLOGIST: CPT | Mod: 26 | Performed by: RADIOLOGY

## 2019-01-01 PROCEDURE — 85610 PROTHROMBIN TIME: CPT | Performed by: INTERNAL MEDICINE

## 2019-01-01 RX ORDER — LORAZEPAM 0.5 MG/1
0.5 TABLET ORAL
Status: DISCONTINUED | OUTPATIENT
Start: 2019-01-01 | End: 2019-01-01 | Stop reason: HOSPADM

## 2019-01-01 RX ORDER — LEVOFLOXACIN 500 MG/1
500 TABLET, FILM COATED ORAL DAILY
Qty: 7 TABLET | Refills: 0 | Status: SHIPPED | OUTPATIENT
Start: 2019-01-01 | End: 2019-01-01

## 2019-01-01 RX ORDER — FLUMAZENIL 0.1 MG/ML
0.2 INJECTION, SOLUTION INTRAVENOUS
Status: DISCONTINUED | OUTPATIENT
Start: 2019-01-01 | End: 2019-01-01

## 2019-01-01 RX ORDER — MORPHINE SULFATE 4 MG/ML
4 INJECTION, SOLUTION INTRAMUSCULAR; INTRAVENOUS
Status: COMPLETED | OUTPATIENT
Start: 2019-01-01 | End: 2019-01-01

## 2019-01-01 RX ORDER — LORAZEPAM 0.5 MG/1
0.5 TABLET ORAL EVERY 6 HOURS PRN
OUTPATIENT
Start: 2019-01-01

## 2019-01-01 RX ORDER — FLUCONAZOLE 100 MG/1
TABLET ORAL
Qty: 3 TABLET | Refills: 1 | Status: SHIPPED | OUTPATIENT
Start: 2019-01-01

## 2019-01-01 RX ORDER — MAGNESIUM CARB/ALUMINUM HYDROX 105-160MG
296 TABLET,CHEWABLE ORAL ONCE
Status: COMPLETED | OUTPATIENT
Start: 2019-01-01 | End: 2019-01-01

## 2019-01-01 RX ORDER — SODIUM CHLORIDE 9 MG/ML
INJECTION, SOLUTION INTRAVENOUS CONTINUOUS PRN
Status: DISCONTINUED | OUTPATIENT
Start: 2019-01-01 | End: 2019-01-01

## 2019-01-01 RX ORDER — AZITHROMYCIN 250 MG/1
TABLET, FILM COATED ORAL
Qty: 6 TABLET | Refills: 0 | Status: SHIPPED | OUTPATIENT
Start: 2019-01-01 | End: 2019-01-01

## 2019-01-01 RX ORDER — FENTANYL CITRATE 50 UG/ML
25-50 INJECTION, SOLUTION INTRAMUSCULAR; INTRAVENOUS EVERY 5 MIN PRN
Status: DISCONTINUED | OUTPATIENT
Start: 2019-01-01 | End: 2019-01-01 | Stop reason: HOSPADM

## 2019-01-01 RX ORDER — FENTANYL CITRATE 50 UG/ML
25-50 INJECTION, SOLUTION INTRAMUSCULAR; INTRAVENOUS
Status: COMPLETED | OUTPATIENT
Start: 2019-01-01 | End: 2019-01-01

## 2019-01-01 RX ORDER — METRONIDAZOLE 500 MG/1
500 TABLET ORAL 2 TIMES DAILY
Qty: 14 TABLET | Refills: 0 | Status: SHIPPED | OUTPATIENT
Start: 2019-01-01 | End: 2019-01-01

## 2019-01-01 RX ORDER — NALOXONE HYDROCHLORIDE 0.4 MG/ML
.1-.4 INJECTION, SOLUTION INTRAMUSCULAR; INTRAVENOUS; SUBCUTANEOUS
Status: DISCONTINUED | OUTPATIENT
Start: 2019-01-01 | End: 2019-01-01 | Stop reason: HOSPADM

## 2019-01-01 RX ORDER — ACETAMINOPHEN 650 MG/1
650 SUPPOSITORY RECTAL EVERY 4 HOURS PRN
Status: DISCONTINUED | OUTPATIENT
Start: 2019-01-01 | End: 2019-01-01 | Stop reason: HOSPADM

## 2019-01-01 RX ORDER — SENNOSIDES 8.6 MG
1 TABLET ORAL 2 TIMES DAILY PRN
Qty: 28 TABLET | Refills: 0 | Status: SHIPPED | OUTPATIENT
Start: 2019-01-01 | End: 2019-01-01

## 2019-01-01 RX ORDER — LEVALBUTEROL TARTRATE 45 UG/1
2 AEROSOL, METERED ORAL EVERY 4 HOURS PRN
Status: DISCONTINUED | OUTPATIENT
Start: 2019-01-01 | End: 2019-01-01 | Stop reason: HOSPADM

## 2019-01-01 RX ORDER — SIMVASTATIN 20 MG
20 TABLET ORAL AT BEDTIME
Status: DISCONTINUED | OUTPATIENT
Start: 2019-01-01 | End: 2019-01-01 | Stop reason: HOSPADM

## 2019-01-01 RX ORDER — IOPAMIDOL 755 MG/ML
84 INJECTION, SOLUTION INTRAVASCULAR ONCE
Status: COMPLETED | OUTPATIENT
Start: 2019-01-01 | End: 2019-01-01

## 2019-01-01 RX ORDER — AMOXICILLIN 250 MG
2 CAPSULE ORAL 2 TIMES DAILY PRN
Status: DISCONTINUED | OUTPATIENT
Start: 2019-01-01 | End: 2019-01-01 | Stop reason: HOSPADM

## 2019-01-01 RX ORDER — ALBUTEROL SULFATE 90 UG/1
2 AEROSOL, METERED RESPIRATORY (INHALATION) EVERY 6 HOURS PRN
Qty: 18 G | Refills: 1 | Status: SHIPPED | OUTPATIENT
Start: 2019-01-01

## 2019-01-01 RX ORDER — IPRATROPIUM BROMIDE AND ALBUTEROL SULFATE 2.5; .5 MG/3ML; MG/3ML
1 SOLUTION RESPIRATORY (INHALATION) EVERY 6 HOURS PRN
Qty: 30 VIAL | Refills: 0 | Status: SHIPPED | OUTPATIENT
Start: 2019-01-01 | End: 2019-01-01

## 2019-01-01 RX ORDER — IOPAMIDOL 755 MG/ML
68 INJECTION, SOLUTION INTRAVASCULAR ONCE
Status: COMPLETED | OUTPATIENT
Start: 2019-01-01 | End: 2019-01-01

## 2019-01-01 RX ORDER — LOSARTAN POTASSIUM 100 MG/1
100 TABLET ORAL DAILY
Status: DISCONTINUED | OUTPATIENT
Start: 2019-01-01 | End: 2019-01-01 | Stop reason: HOSPADM

## 2019-01-01 RX ORDER — IOPAMIDOL 755 MG/ML
62 INJECTION, SOLUTION INTRAVASCULAR ONCE
Status: COMPLETED | OUTPATIENT
Start: 2019-01-01 | End: 2019-01-01

## 2019-01-01 RX ORDER — ALCOHOL ANTISEPTIC PADS
PADS, MEDICATED (EA) TOPICAL
Qty: 100 EACH | Refills: 0 | Status: SHIPPED | OUTPATIENT
Start: 2019-01-01

## 2019-01-01 RX ORDER — BISACODYL 10 MG
10 SUPPOSITORY, RECTAL RECTAL DAILY PRN
Status: DISCONTINUED | OUTPATIENT
Start: 2019-01-01 | End: 2019-01-01 | Stop reason: HOSPADM

## 2019-01-01 RX ORDER — POLYETHYLENE GLYCOL 3350 17 G/17G
17 POWDER, FOR SOLUTION ORAL DAILY PRN
Status: DISCONTINUED | OUTPATIENT
Start: 2019-01-01 | End: 2019-01-01 | Stop reason: HOSPADM

## 2019-01-01 RX ORDER — SODIUM CHLORIDE AND POTASSIUM CHLORIDE 150; 900 MG/100ML; MG/100ML
INJECTION, SOLUTION INTRAVENOUS CONTINUOUS
Status: DISCONTINUED | OUTPATIENT
Start: 2019-01-01 | End: 2019-01-01 | Stop reason: HOSPADM

## 2019-01-01 RX ORDER — FENTANYL CITRATE 50 UG/ML
25 INJECTION, SOLUTION INTRAMUSCULAR; INTRAVENOUS
Status: DISCONTINUED | OUTPATIENT
Start: 2019-01-01 | End: 2019-01-01

## 2019-01-01 RX ORDER — ACETAMINOPHEN 325 MG/1
650 TABLET ORAL EVERY 4 HOURS PRN
Status: DISCONTINUED | OUTPATIENT
Start: 2019-01-01 | End: 2019-01-01 | Stop reason: HOSPADM

## 2019-01-01 RX ORDER — LABETALOL 20 MG/4 ML (5 MG/ML) INTRAVENOUS SYRINGE
10-40 EVERY 10 MIN PRN
Status: DISCONTINUED | OUTPATIENT
Start: 2019-01-01 | End: 2019-01-01 | Stop reason: HOSPADM

## 2019-01-01 RX ORDER — ALBUTEROL SULFATE 90 UG/1
2 AEROSOL, METERED RESPIRATORY (INHALATION) EVERY 6 HOURS PRN
COMMUNITY
End: 2019-01-01

## 2019-01-01 RX ORDER — LOSARTAN POTASSIUM 100 MG/1
100 TABLET ORAL 2 TIMES DAILY
COMMUNITY

## 2019-01-01 RX ORDER — LEVALBUTEROL TARTRATE 45 UG/1
2 AEROSOL, METERED ORAL EVERY 4 HOURS PRN
COMMUNITY

## 2019-01-01 RX ORDER — LORAZEPAM 0.5 MG/1
0.5 TABLET ORAL 2 TIMES DAILY PRN
Qty: 60 TABLET | Refills: 0 | Status: SHIPPED | OUTPATIENT
Start: 2019-01-01

## 2019-01-01 RX ORDER — ALBUTEROL SULFATE 90 UG/1
2 AEROSOL, METERED RESPIRATORY (INHALATION) ONCE
Status: DISCONTINUED | OUTPATIENT
Start: 2019-01-01 | End: 2019-01-01

## 2019-01-01 RX ORDER — ONDANSETRON 4 MG/1
4 TABLET, ORALLY DISINTEGRATING ORAL EVERY 6 HOURS PRN
Status: DISCONTINUED | OUTPATIENT
Start: 2019-01-01 | End: 2019-01-01 | Stop reason: HOSPADM

## 2019-01-01 RX ORDER — LORAZEPAM 0.5 MG/1
0.5 TABLET ORAL AT BEDTIME
Status: DISCONTINUED | OUTPATIENT
Start: 2019-01-01 | End: 2019-01-01

## 2019-01-01 RX ORDER — PREDNISONE 20 MG/1
20 TABLET ORAL 2 TIMES DAILY
Qty: 10 TABLET | Refills: 0 | Status: SHIPPED | OUTPATIENT
Start: 2019-01-01 | End: 2019-01-01

## 2019-01-01 RX ORDER — NICOTINE POLACRILEX 4 MG
15-30 LOZENGE BUCCAL
Status: DISCONTINUED | OUTPATIENT
Start: 2019-01-01 | End: 2019-01-01 | Stop reason: HOSPADM

## 2019-01-01 RX ORDER — FLUCONAZOLE 100 MG/1
TABLET ORAL
Qty: 3 TABLET | Refills: 0 | Status: SHIPPED | OUTPATIENT
Start: 2019-01-01 | End: 2019-01-01

## 2019-01-01 RX ORDER — HYDRALAZINE HYDROCHLORIDE 20 MG/ML
10-20 INJECTION INTRAMUSCULAR; INTRAVENOUS
Status: DISCONTINUED | OUTPATIENT
Start: 2019-01-01 | End: 2019-01-01 | Stop reason: HOSPADM

## 2019-01-01 RX ORDER — NALOXONE HYDROCHLORIDE 0.4 MG/ML
.1-.4 INJECTION, SOLUTION INTRAMUSCULAR; INTRAVENOUS; SUBCUTANEOUS
Status: DISCONTINUED | OUTPATIENT
Start: 2019-01-01 | End: 2019-01-01

## 2019-01-01 RX ORDER — ATORVASTATIN CALCIUM 40 MG/1
40 TABLET, FILM COATED ORAL
Status: DISCONTINUED | OUTPATIENT
Start: 2019-01-01 | End: 2019-01-01

## 2019-01-01 RX ORDER — LEVALBUTEROL TARTRATE 45 UG/1
AEROSOL, METERED ORAL
Qty: 15 G | Refills: 1 | Status: SHIPPED | OUTPATIENT
Start: 2019-01-01 | End: 2019-01-01

## 2019-01-01 RX ORDER — LIDOCAINE HYDROCHLORIDE 40 MG/ML
1.5 SOLUTION TOPICAL ONCE
Status: COMPLETED | OUTPATIENT
Start: 2019-01-01 | End: 2019-01-01

## 2019-01-01 RX ORDER — GLYCOPYRROLATE 0.2 MG/ML
0.1 INJECTION, SOLUTION INTRAMUSCULAR; INTRAVENOUS ONCE
Status: COMPLETED | OUTPATIENT
Start: 2019-01-01 | End: 2019-01-01

## 2019-01-01 RX ORDER — IPRATROPIUM BROMIDE AND ALBUTEROL SULFATE 2.5; .5 MG/3ML; MG/3ML
1 SOLUTION RESPIRATORY (INHALATION) EVERY 6 HOURS PRN
Status: DISCONTINUED | OUTPATIENT
Start: 2019-01-01 | End: 2019-01-01 | Stop reason: HOSPADM

## 2019-01-01 RX ORDER — ALBUTEROL SULFATE 90 UG/1
2 AEROSOL, METERED RESPIRATORY (INHALATION) EVERY 6 HOURS PRN
Status: DISCONTINUED | OUTPATIENT
Start: 2019-01-01 | End: 2019-01-01 | Stop reason: HOSPADM

## 2019-01-01 RX ORDER — ONDANSETRON 2 MG/ML
4 INJECTION INTRAMUSCULAR; INTRAVENOUS EVERY 6 HOURS PRN
Status: DISCONTINUED | OUTPATIENT
Start: 2019-01-01 | End: 2019-01-01 | Stop reason: HOSPADM

## 2019-01-01 RX ORDER — IPRATROPIUM BROMIDE AND ALBUTEROL SULFATE 2.5; .5 MG/3ML; MG/3ML
3 SOLUTION RESPIRATORY (INHALATION) ONCE
Status: COMPLETED | OUTPATIENT
Start: 2019-01-01 | End: 2019-01-01

## 2019-01-01 RX ORDER — ASPIRIN 81 MG/1
324 TABLET, CHEWABLE ORAL ONCE
Status: COMPLETED | OUTPATIENT
Start: 2019-01-01 | End: 2019-01-01

## 2019-01-01 RX ORDER — LORAZEPAM 0.5 MG/1
0.5 TABLET ORAL AT BEDTIME
Qty: 30 TABLET | Refills: 0 | Status: SHIPPED | OUTPATIENT
Start: 2019-01-01 | End: 2019-01-01

## 2019-01-01 RX ORDER — IPRATROPIUM BROMIDE AND ALBUTEROL SULFATE 2.5; .5 MG/3ML; MG/3ML
1 SOLUTION RESPIRATORY (INHALATION) EVERY 6 HOURS PRN
Qty: 30 VIAL | Refills: 11 | Status: SHIPPED | OUTPATIENT
Start: 2019-01-01

## 2019-01-01 RX ORDER — LIDOCAINE 40 MG/G
CREAM TOPICAL
Status: DISCONTINUED | OUTPATIENT
Start: 2019-01-01 | End: 2019-01-01

## 2019-01-01 RX ORDER — AMOXICILLIN 250 MG
1 CAPSULE ORAL 2 TIMES DAILY PRN
Status: DISCONTINUED | OUTPATIENT
Start: 2019-01-01 | End: 2019-01-01 | Stop reason: HOSPADM

## 2019-01-01 RX ORDER — DEXTROSE MONOHYDRATE 25 G/50ML
25-50 INJECTION, SOLUTION INTRAVENOUS
Status: DISCONTINUED | OUTPATIENT
Start: 2019-01-01 | End: 2019-01-01 | Stop reason: HOSPADM

## 2019-01-01 RX ORDER — GADOBUTROL 604.72 MG/ML
10 INJECTION INTRAVENOUS ONCE
Status: COMPLETED | OUTPATIENT
Start: 2019-01-01 | End: 2019-01-01

## 2019-01-01 RX ORDER — AMOXICILLIN 500 MG/1
500 CAPSULE ORAL 3 TIMES DAILY
Qty: 30 CAPSULE | Refills: 0 | Status: SHIPPED | OUTPATIENT
Start: 2019-01-01 | End: 2019-01-01

## 2019-01-01 RX ORDER — SIMVASTATIN 20 MG
20 TABLET ORAL AT BEDTIME
Status: DISCONTINUED | OUTPATIENT
Start: 2019-01-01 | End: 2019-01-01

## 2019-01-01 RX ORDER — LIDOCAINE HYDROCHLORIDE 10 MG/ML
30 INJECTION, SOLUTION EPIDURAL; INFILTRATION; INTRACAUDAL; PERINEURAL ONCE
Status: COMPLETED | OUTPATIENT
Start: 2019-01-01 | End: 2019-01-01

## 2019-01-01 RX ORDER — FLUMAZENIL 0.1 MG/ML
0.2 INJECTION, SOLUTION INTRAVENOUS
Status: DISCONTINUED | OUTPATIENT
Start: 2019-01-01 | End: 2019-01-01 | Stop reason: HOSPADM

## 2019-01-01 RX ORDER — ONDANSETRON 2 MG/ML
4 INJECTION INTRAMUSCULAR; INTRAVENOUS EVERY 30 MIN PRN
Status: DISCONTINUED | OUTPATIENT
Start: 2019-01-01 | End: 2019-01-01 | Stop reason: HOSPADM

## 2019-01-01 RX ADMIN — Medication 2 SPRAY: at 04:14

## 2019-01-01 RX ADMIN — IPRATROPIUM BROMIDE AND ALBUTEROL SULFATE 3 ML: 2.5; .5 SOLUTION RESPIRATORY (INHALATION) at 16:35

## 2019-01-01 RX ADMIN — IPRATROPIUM BROMIDE AND ALBUTEROL SULFATE 3 ML: 2.5; .5 SOLUTION RESPIRATORY (INHALATION) at 07:30

## 2019-01-01 RX ADMIN — POTASSIUM CHLORIDE AND SODIUM CHLORIDE: 900; 150 INJECTION, SOLUTION INTRAVENOUS at 21:01

## 2019-01-01 RX ADMIN — MAGNESIUM CITRATE 296 ML: 1.75 LIQUID ORAL at 02:29

## 2019-01-01 RX ADMIN — SODIUM CHLORIDE 84 ML: 9 INJECTION, SOLUTION INTRAVENOUS at 01:33

## 2019-01-01 RX ADMIN — IOPAMIDOL 62 ML: 755 INJECTION, SOLUTION INTRAVENOUS at 01:04

## 2019-01-01 RX ADMIN — ATORVASTATIN CALCIUM 40 MG: 40 TABLET, FILM COATED ORAL at 21:01

## 2019-01-01 RX ADMIN — ACETAMINOPHEN 650 MG: 325 TABLET, FILM COATED ORAL at 16:27

## 2019-01-01 RX ADMIN — LEVALBUTEROL TARTRATE 2 PUFF: 45 AEROSOL, METERED ORAL at 13:32

## 2019-01-01 RX ADMIN — Medication 2 SPRAY: at 23:03

## 2019-01-01 RX ADMIN — MIDAZOLAM HYDROCHLORIDE 1 MG: 1 INJECTION, SOLUTION INTRAMUSCULAR; INTRAVENOUS at 13:54

## 2019-01-01 RX ADMIN — ACETAMINOPHEN 650 MG: 325 TABLET, FILM COATED ORAL at 00:47

## 2019-01-01 RX ADMIN — POTASSIUM CHLORIDE AND SODIUM CHLORIDE: 900; 150 INJECTION, SOLUTION INTRAVENOUS at 23:05

## 2019-01-01 RX ADMIN — FENTANYL CITRATE 25 MCG: 50 INJECTION, SOLUTION INTRAMUSCULAR; INTRAVENOUS at 11:36

## 2019-01-01 RX ADMIN — ACETAMINOPHEN 650 MG: 325 TABLET, FILM COATED ORAL at 00:21

## 2019-01-01 RX ADMIN — DOCUSATE SODIUM 286 ML: 10 LIQUID ORAL at 10:56

## 2019-01-01 RX ADMIN — FLUTICASONE FUROATE AND VILANTEROL TRIFENATATE 1 PUFF: 200; 25 POWDER RESPIRATORY (INHALATION) at 08:36

## 2019-01-01 RX ADMIN — ACETAMINOPHEN 650 MG: 325 TABLET, FILM COATED ORAL at 07:05

## 2019-01-01 RX ADMIN — Medication 10 MG: at 16:30

## 2019-01-01 RX ADMIN — SIMVASTATIN 20 MG: 20 TABLET, FILM COATED ORAL at 21:37

## 2019-01-01 RX ADMIN — DOCUSATE SODIUM 286 ML: 10 LIQUID ORAL at 01:41

## 2019-01-01 RX ADMIN — IPRATROPIUM BROMIDE AND ALBUTEROL SULFATE 3 ML: 2.5; .5 SOLUTION RESPIRATORY (INHALATION) at 09:41

## 2019-01-01 RX ADMIN — LIDOCAINE HYDROCHLORIDE 6 ML: 10 INJECTION, SOLUTION EPIDURAL; INFILTRATION; INTRACAUDAL; PERINEURAL at 14:16

## 2019-01-01 RX ADMIN — SODIUM CHLORIDE 1000 ML: 9 INJECTION, SOLUTION INTRAVENOUS at 00:01

## 2019-01-01 RX ADMIN — MIDAZOLAM 0.5 MG: 1 INJECTION INTRAMUSCULAR; INTRAVENOUS at 11:47

## 2019-01-01 RX ADMIN — LEVALBUTEROL TARTRATE 2 PUFF: 45 AEROSOL, METERED ORAL at 21:56

## 2019-01-01 RX ADMIN — Medication 1 MG: at 00:21

## 2019-01-01 RX ADMIN — MORPHINE SULFATE 4 MG: 4 INJECTION INTRAVENOUS at 00:00

## 2019-01-01 RX ADMIN — Medication 1 MG: at 21:37

## 2019-01-01 RX ADMIN — ACETAMINOPHEN 650 MG: 325 TABLET, FILM COATED ORAL at 14:04

## 2019-01-01 RX ADMIN — LOSARTAN POTASSIUM 100 MG: 100 TABLET ORAL at 09:12

## 2019-01-01 RX ADMIN — FENTANYL CITRATE 25 MCG: 50 INJECTION, SOLUTION INTRAMUSCULAR; INTRAVENOUS at 11:39

## 2019-01-01 RX ADMIN — SODIUM CHLORIDE 60 ML: 9 INJECTION, SOLUTION INTRAVENOUS at 01:04

## 2019-01-01 RX ADMIN — IOPAMIDOL 68 ML: 755 INJECTION, SOLUTION INTRAVENOUS at 01:32

## 2019-01-01 RX ADMIN — TOPICAL ANESTHETIC 0.5 ML: 200 SPRAY DENTAL; PERIODONTAL at 10:59

## 2019-01-01 RX ADMIN — FLUTICASONE FUROATE AND VILANTEROL TRIFENATATE 1 PUFF: 200; 25 POWDER RESPIRATORY (INHALATION) at 07:48

## 2019-01-01 RX ADMIN — ACETAMINOPHEN 650 MG: 325 TABLET, FILM COATED ORAL at 18:07

## 2019-01-01 RX ADMIN — MIDAZOLAM 1 MG: 1 INJECTION INTRAMUSCULAR; INTRAVENOUS at 11:39

## 2019-01-01 RX ADMIN — IPRATROPIUM BROMIDE AND ALBUTEROL SULFATE 3 ML: 2.5; .5 SOLUTION RESPIRATORY (INHALATION) at 17:52

## 2019-01-01 RX ADMIN — ENOXAPARIN SODIUM 90 MG: 100 INJECTION SUBCUTANEOUS at 04:03

## 2019-01-01 RX ADMIN — MIDAZOLAM 0.5 MG: 1 INJECTION INTRAMUSCULAR; INTRAVENOUS at 11:36

## 2019-01-01 RX ADMIN — SODIUM CHLORIDE 65 ML: 9 INJECTION, SOLUTION INTRAVENOUS at 08:21

## 2019-01-01 RX ADMIN — Medication 2 SPRAY: at 10:20

## 2019-01-01 RX ADMIN — GADOBUTROL 10 ML: 604.72 INJECTION INTRAVENOUS at 17:08

## 2019-01-01 RX ADMIN — Medication 2 SPRAY: at 00:51

## 2019-01-01 RX ADMIN — IOPAMIDOL 84 ML: 755 INJECTION, SOLUTION INTRAVENOUS at 08:18

## 2019-01-01 RX ADMIN — ACETAMINOPHEN 650 MG: 325 TABLET, FILM COATED ORAL at 07:44

## 2019-01-01 RX ADMIN — ONDANSETRON 4 MG: 2 INJECTION INTRAMUSCULAR; INTRAVENOUS at 00:00

## 2019-01-01 RX ADMIN — LIDOCAINE HYDROCHLORIDE 1.5 ML: 40 SOLUTION TOPICAL at 10:54

## 2019-01-01 RX ADMIN — IPRATROPIUM BROMIDE AND ALBUTEROL SULFATE 3 ML: 2.5; .5 SOLUTION RESPIRATORY (INHALATION) at 07:18

## 2019-01-01 RX ADMIN — ASPIRIN 81 MG 324 MG: 81 TABLET ORAL at 13:55

## 2019-01-01 RX ADMIN — FENTANYL CITRATE 50 MCG: 50 INJECTION, SOLUTION INTRAMUSCULAR; INTRAVENOUS at 13:55

## 2019-01-01 RX ADMIN — GLYCOPYRROLATE 0.1 MG: 0.2 INJECTION, SOLUTION INTRAMUSCULAR; INTRAVENOUS at 10:55

## 2019-01-01 ASSESSMENT — ENCOUNTER SYMPTOMS
VOMITING: 0
NAUSEA: 0
DYSURIA: 1
FOCAL WEAKNESS: 0
SHORTNESS OF BREATH: 0
FEVER: 0
COUGH: 1
FEVER: 0
SPUTUM PRODUCTION: 0
SINUS PAIN: 0
DIFFICULTY URINATING: 1
ABDOMINAL PAIN: 1
SORE THROAT: 0
CHILLS: 0
SHORTNESS OF BREATH: 0
FEVER: 0
WEAKNESS: 1
VOMITING: 0
CONSTIPATION: 1
CHILLS: 0
CONSTIPATION: 1
VOMITING: 0
DIARRHEA: 0
SINUS PAIN: 0
NAUSEA: 0
ABDOMINAL PAIN: 1
FEVER: 0
VOMITING: 0
NUMBNESS: 0
DIARRHEA: 0
BLOOD IN STOOL: 0
VOMITING: 0
SHORTNESS OF BREATH: 1
WHEEZING: 1
SPEECH DIFFICULTY: 1
SHORTNESS OF BREATH: 1
VOMITING: 0
ABDOMINAL PAIN: 1
CHEST TIGHTNESS: 1
SINUS PRESSURE: 0
NAUSEA: 0
ABDOMINAL PAIN: 1
CHILLS: 0
NAUSEA: 1
COUGH: 1
FEVER: 0
HEADACHES: 0
ABDOMINAL PAIN: 0
CONSTIPATION: 1

## 2019-01-01 ASSESSMENT — MIFFLIN-ST. JEOR
SCORE: 1337.27
SCORE: 1332.73
SCORE: 1364.49
SCORE: 1347
SCORE: 1335
SCORE: 1348.6
SCORE: 1348.6
SCORE: 1342.71
SCORE: 1341.81
SCORE: 1338
SCORE: 1325.92

## 2019-01-01 ASSESSMENT — ACTIVITIES OF DAILY LIVING (ADL)
DEPENDENT_IADLS:: CLEANING;COOKING;LAUNDRY;SHOPPING;MEAL PREPARATION;TRANSPORTATION;MEDICATION MANAGEMENT
DEPENDENT_IADLS:: INDEPENDENT
DEPENDENT_IADLS:: CLEANING;COOKING;LAUNDRY;SHOPPING;MEAL PREPARATION;TRANSPORTATION
DEPENDENT_IADLS:: INDEPENDENT
PREVIOUS_RESPONSIBILITIES: MEAL PREP;HOUSEKEEPING;LAUNDRY;SHOPPING;YARDWORK;MEDICATION MANAGEMENT;FINANCES;DRIVING;WORK
DEPENDENT_IADLS:: CLEANING;COOKING;LAUNDRY;SHOPPING;MEAL PREPARATION;TRANSPORTATION

## 2019-01-05 NOTE — PROGRESS NOTES
SUBJECTIVE:   Primo Dorsey is a 78 year old male who presents to clinic today for the following health issues:    HPI     Recently quit smoking 5 days ago.  Presents with increased sinus pain and drainage.  No fever or chills.  Has been coughing up some green mucus.  No SOB or congested cough.  Headed to work this AM.  Hx of COPD- has been a smoker x 60 years.    Problem list and histories reviewed & adjusted, as indicated.  Additional history: as documented        Patient Active Problem List   Diagnosis     HL (hearing loss)     HTN (hypertension)     Hyperlipidemia LDL goal <130     Advance Care Planning     Tobacco use disorder     DJD (degenerative joint disease) of cervical spine     Bilateral inguinal hernia without obstruction or gangrene, recurrence not specified     Other emphysema (H)     Chronic seasonal allergic rhinitis, unspecified trigger     Past Surgical History:   Procedure Laterality Date     APPENDECTOMY       EYE SURGERY      bilateral cataract     HERNIORRHAPHY UMBILICAL N/A 5/12/2016    Procedure: HERNIORRHAPHY UMBILICAL;  Surgeon: Ariel Kaye MD;  Location: [facility]     LAPAROSCOPIC HERNIORRHAPHY INGUINAL BILATERAL Bilateral 5/12/2016    Procedure: LAPAROSCOPIC HERNIORRHAPHY INGUINAL BILATERAL;  Surgeon: Ariel Kaye MD;  Location: [facility]     TONSILLECTOMY         Social History     Tobacco Use     Smoking status: Current Every Day Smoker     Packs/day: 0.75     Years: 45.00     Pack years: 33.75     Types: Cigarettes     Smokeless tobacco: Never Used     Tobacco comment: 1 pack will last 2-3 days   Substance Use Topics     Alcohol use: No     Alcohol/week: 0.0 oz     Comment: not for 10 years     Family History   Problem Relation Age of Onset     Cerebrovascular Disease Father         age 60     Allergies Daughter      Cancer Brother         lung and stomach     Hypertension Sister      Hypertension Sister      Hypertension Brother      C.A.D. Brother         cabg      Diabetes No family hx of      Cancer - colorectal No family hx of      Prostate Cancer No family hx of          Current Outpatient Medications   Medication Sig Dispense Refill     ADVAIR DISKUS 250-50 MCG/DOSE diskus inhaler INHALE ONE PUFF BY MOUTH TWICE DAILY  180 Inhaler 0     amoxicillin (AMOXIL) 500 MG capsule Take 1 capsule (500 mg) by mouth 3 times daily for 10 days 30 capsule 0     ASPIRIN 81 MG OR TABS 1 tab po QD (Once per day)       fluticasone (FLONASE) 50 MCG/ACT spray Spray 1-2 sprays into both nostrils daily 16 g 1     losartan (COZAAR) 100 MG tablet TAKE ONE TABLET BY MOUTH ONE TIME DAILY  90 tablet 2     simvastatin (ZOCOR) 20 MG tablet TAKE ONE TABLET (20 mg) BY MOUTH DAILY AT BEDTIME 90 tablet 3     XOPENEX HFA 45 MCG/ACT inhaler INHALE TWO PUFFS into the lungs EVERY SIX HOURS AS NEEDED for shortness of breath/dyspnea or wheezing 15 g 2     nystatin (MYCOSTATIN) 731515 UNIT/ML suspension Take 5 mLs (500,000 Units) by mouth 4 times daily (Patient not taking: Reported on 8/30/2018) 280 mL 0     order for DME Dog walking three times weekly 1 unit marking on an U-100 insulin syringe 11     Allergies   Allergen Reactions     No Known Drug Allergies      Recent Labs   Lab Test 08/20/18  0905 08/21/17  0926 04/29/16  1557  01/20/14  0843   A1C  --  5.5  --   --   --    LDL 80 82 85   < > 93   HDL 66 74 56   < > 53   TRIG 69 53  --   --  92   ALT 22 27 25   < > 22   CR 0.84 0.86 0.89   < > 0.80   GFRESTIMATED 89 87 84   < > >90   GFRESTBLACK >90 >90 >90  African American GFR Calc     < > >90   POTASSIUM 4.0 4.2 4.4   < > 4.2   TSH  --  1.60  --   --   --     < > = values in this interval not displayed.      BP Readings from Last 3 Encounters:   01/05/19 150/80   08/30/18 130/60   08/20/18 169/78    Wt Readings from Last 3 Encounters:   08/30/18 58.1 kg (128 lb)   08/20/18 59.4 kg (131 lb)   07/10/18 59 kg (130 lb)                    ROS:  Constitutional, HEENT, cardiovascular, pulmonary, gi and gu systems  are negative, except as otherwise noted.    OBJECTIVE:     /80   Pulse 81   Temp 97.1  F (36.2  C) (Oral)   SpO2 96%   There is no height or weight on file to calculate BMI.  GENERAL: healthy, alert and no distress  EYES: Eyes grossly normal to inspection, PERRL and conjunctivae and sclerae normal  HENT: ear canals and TM's normal, nose and mouth without ulcers or lesions, clear rhinorrhea-- feels sinus pain and pressure in maxillary and frontal sinuses bilaterally.  NECK: no adenopathy, no asymmetry, masses, or scars and thyroid normal to palpation  RESP: lungs clear to auscultation - no rales, rhonchi or wheezes, dry cough  CV: regular rate and rhythm, normal S1 S2, no S3 or S4, no murmur, click or rub, no peripheral edema and peripheral pulses strong  ABDOMEN: soft, nontender, no hepatosplenomegaly, no masses and bowel sounds normal  MS: no gross musculoskeletal defects noted, no edema  PSYCH: mentation appears normal, affect normal/bright    ASSESSMENT/PLAN:     1. Acute sinusitis with symptoms > 10 days    - amoxicillin (AMOXIL) 500 MG capsule; Take 1 capsule (500 mg) by mouth 3 times daily for 10 days  Dispense: 30 capsule; Refill: 0    Will treat with amoxicillin 500 mg tid x 10 days.  Continue with increased fluids and rest.  Close Follow-up if no change or worsening symptoms prn,    [facility] Provider  [facility]

## 2019-01-09 NOTE — ED AVS SNAPSHOT
Emergency Department  [address] [place] [address]  Phone:  [phone]  Fax:  [phone]                                    Primo Dorsey   MRN: [id number]    Department:   Emergency Department   Date of Visit:  1/9/2019           After Visit Summary Signature Page    I have received my discharge instructions, and my questions have been answered. I have discussed any challenges I see with this plan with the nurse or doctor.    ..........................................................................................................................................  Patient/Patient Representative Signature      ..........................................................................................................................................  Patient Representative Print Name and Relationship to Patient    ..................................................               ................................................  Date                                   Time    ..........................................................................................................................................  Reviewed by Signature/Title    ...................................................              ..............................................  Date                                               Time          22EPIC Rev 08/18

## 2019-01-10 NOTE — DISCHARGE INSTRUCTIONS
Start daily lovenox injections (a blood thinner to treat your pulmonary embolism or blood clot in the lungs)    Start two antibiotics to treat your colon infection    Follow-up very quickly with the oncology clinic to determine next steps in evaluating your lung mass

## 2019-01-10 NOTE — ED PROVIDER NOTES
History     Chief Complaint:  Abdominal Pain, Shortness of Breath     The history is provided by the patient.      Primo Dorsey is a 78 year old male with a history of COPD, hypertension and hyperlipidemia who presents to the emergency department for evaluation of abdominal pain and shortness of breath. Of note, the patient was recently seen in Urgent Care on 1/5 and was diagnosed with acute sinusitis and started on amoxicillin. He states that he started this medication on 1/5 and ever since has been complaining of abdominal pain. He states that although the sinus infection symptoms have cleared since starting the antibiotics, he has had persistent abdominal pain, shortness of breath and chest tightness above his baseline COPD, prompting him to seek further evaluation here in the ED. Here, the patient complains of the abdominal pain, described as cramping, that seems to improve with passing gas and bowel movements, stating that he had two normal bowel movements today. He additionally complains of the increased shortness of breath and chest tightness above his baseline COPD which he states is being worsened by this abdominal pain.  He was very short of breath walking from car to . He denies chest pain, vomiting or fevers, diarrhea, urinary symptoms.    Allergies:  NKDA    Medications:    Advair  Aspirin 81 mg  Fluticasone  Losartan  Nystatin  Simvastatin     Past Medical History:    COPD  Hypertension  Hyperlipidemia  DJD  Emphysema  Inguinal Hernia    Past Surgical History:    Appendectomy  Bilateral Cataract IOL  Umbilical Herniorrhaphy  Tonsillectomy  Laparoscopic Bilateral Inguinal Herniorrhaphy     Family History:    Cerebrovascular Disease  Lung Cancer  Stomach Cancer  Hypertension    Social History:  Marital Status:  Single [1]  Tobacco Use: Yes, 0.75 packs/day, Quit: 1/1/19  Alcohol Use: No      Review of Systems   Constitutional: Negative for fever.   HENT: Negative for sinus pressure and  "sinus pain.    Respiratory: Positive for chest tightness and shortness of breath.    Cardiovascular: Negative for chest pain.   Gastrointestinal: Positive for abdominal pain. Negative for diarrhea and vomiting.   All other systems reviewed and are negative.    Physical Exam     Patient Vitals for the past 24 hrs:   BP Temp Temp src Pulse Resp SpO2 Height Weight   01/10/19 0330 (!) 151/96 -- -- 87 -- -- -- --   01/10/19 0250 -- -- -- -- -- 94 % -- --   01/10/19 0200 159/89 -- -- 86 -- 94 % -- --   01/10/19 0100 (!) 178/95 -- -- 84 -- 95 % -- --   01/09/19 2253 (!) 224/126 98.2  F (36.8  C) Oral 116 18 94 % 1.803 m (5' 11\") 61.2 kg (135 lb)       Physical Exam  Eyes:               Sclera white; Pupils are equal and round  ENT:                External ears and nares normal  CV:                  Rate as above with regular rhythm                           No CW tenderness, no rash                          Symmetric radial pulses                          No BLE edema  Resp:               Breath sounds clear and equal bilaterally, no wheezing  GI:                   Abdomen is soft, epigastric tenderness, no distention  MS:                  Moves all extremities  Skin:                Warm and dry  Neuro:             Speech is normal and fluent. Alert.      Emergency Department Course   ECG:  Indication: Chest Tightness, Shortness of Breath  Time: 2329  Vent. Rate 92 bpm. IN interval 200. QRS duration 94. QT/QTc 358/442. P-R-T axis 86 90 83. Normal sinus rhythm. Possible left atrial enlargement. Rightward axis. Left ventricular hypertrophy. Abnormal ECG. No significant change compared to EKG date 4/29/16. Read time: 8879    Imaging:  CT Chest w/ IV contrast - PE protocol:  1. A moderate-sized region of confluent and patchy consolidation within the left upper lobe in addition to a 3 cm nodular opacity in the superior segment of the left lower lobe. These findings are suspicious for neoplasm, but an infectious or inflammatory " process could also have this appearance.  2. A few mildly enlarged mediastinal and left hilar lymph nodes.  3. Tiny acute embolus in a subsegmental pulmonary artery in the right upper lobe.  4. Moderate to marked wall thickening of the cecum and proximal and mid ascending colon. This is nonspecific, but could be infectious or inflammatory in etiology.  5. 3.5 cm right adrenal nodule. This is suspicious for a metastasis, but could also represent an adenoma.  6. A PET scan could be considered for further evaluation of the thoracic and right adrenal findings.   As per radiology.     Laboratory:  CBC: WBC: 15.9 (H), HGB: 13.3, PLT: 207  CMP: Glucose 108 (H), o/w WNL (Creatinine: 0.69)  Lipase: 136  Lactic Acid: 0.8  D Dimer: 9.6 (H)  BNP: 381  Troponin I: <0.015  UA with micro: Protein Albumin: 10, RBC: 3 (H), Mucous: Present, o/w negative    Interventions:  0403 Lovenox 90 mg, Subcutaneous    Emergency Department Course:  2314 Nursing notes and vitals reviewed. I performed an exam of the patient as documented above.     IV inserted. Medicine administered as documented above. Blood drawn. This was sent to the lab for further testing, results above.    The patient provided a urine sample here in the emergency department. This was sent for laboratory testing, findings above.     The patient was sent for a chest CT, PE study, while in the emergency department, findings above.     EKG obtained in the ED, see results above.     0207 I consulted with Dr. Miles of radiology concerning the patient's CT results.     0234 I rechecked the patient and discussed the results of his workup thus far. I discussed with him the risks and benefits of admission versus discharge. Patient is refusing admission to the hospital.     0258 I consulted with Dr. Watt of [facility] who recommended starting Lovenox and follow up with [facility] as an outpatient.     0339 I rechecked the patient and informed him of the plan for  discharge and follow up with [facility].     Findings and plan explained to the patient. Patient discharged home with instructions regarding supportive care, medications, and reasons to return. The importance of close follow-up was reviewed.     I personally reviewed the laboratory results with the patient and answered all related questions prior to discharge.    0421 I rechecked the patient and confirmed with him his follow up appointments and plan.     Impression & Plan      Medical Decision Making:  Primo Dorsey is a 78 year old male who presents for evaluation of abdominal pain, but also exertional shortness of breath. He is not wheezing on exam and I do not believe this is secondary to COPD. Workup is concerning for PE with an elevated d dimer. Given his associated abdominal pain, CT of both of these areas was obtained. Multiple findings were discussed with the radiologist. Given the number of findings and the need for anticoagulation but also likely biopsy, I recommended admission to the patient, but he refused multiple times despite indicating clear understanding of my concerns and the potential diagnoses. States he wants to be able to take care of his dog and notify his daughter in [place] to come be in town.  He also understands the harder issue of reversing anticoagulation on an outpatient basis to allow for procedures such as biopsies.  PE is very small with normal vitals and troponin and does not by itself require admission.  Timing of initiating anticoagulation and outpatient plan discussed with Oncology.  He recommended Lovenox as this will be the easiest for them to be able to biopsy with. Referral was placed and phone number was given for him to call and make the appointment. Given the CT findings that are concerning for infection vs mass, he will be on dual antibiotics of Metronidazole and Levofloxacin, as the latter has lung coverage as well as GI.    This patient has made it clear that  he understands the plan, that he understands the diagnoses and the need for rapid follow up. I feel that he is able to make his own decisions, including the decision not to be admitted to the hospital.  Lovenox teaching done by nursing and he is capable of performing this.    Diagnosis:    ICD-10-CM    1. Mass of left lung R91.8 ONC/HEME ADULT REFERRAL   2. Other acute pulmonary embolism without acute cor pulmonale (H) I26.99     Subsegmental   3. Adrenal nodule (H) E27.9 ONC/HEME ADULT REFERRAL   4. Colonic thickening K63.9 ONC/HEME ADULT REFERRAL   5. Abdominal pain, generalized R10.84        Disposition:  discharged to home    Discharge Medications:     Medication List      Started    enoxaparin 100 MG/ML syringe  Commonly known as:  LOVENOX  1.5 mg/kg, Subcutaneous, DAILY     levofloxacin 500 MG tablet  Commonly known as:  LEVAQUIN  500 mg, Oral, DAILY     metroNIDAZOLE 500 MG tablet  Commonly known as:  FLAGYL  500 mg, Oral, 2 TIMES DAILY          Scribe Disclosure:  I, Glynn Amaya, am serving as a scribe on 1/9/2019 at 11:14 PM to personally document services performed by Myranda Aaron MD based on my observations and the provider's statements to me.     Glynn Amaya  1/9/2019    EMERGENCY DEPARTMENT       Myranda Aaron MD  01/10/19 3953

## 2019-01-10 NOTE — ED NOTES
Patient given Lovenox shot and was taught how to self administer the medication. Patient verbalized understanding and correctly demonstrated teach back.

## 2019-01-10 NOTE — ED NOTES
"[facility]  ED Nurse Handoff Report    ED Chief complaint: Shortness of Breath (Pt reports onset of SOB this afternoon. Hx of COPD)      ED Diagnosis:   Final diagnoses:   None       Code Status: to be assessed by admitting provider     Allergies:   Allergies   Allergen Reactions     No Known Drug Allergies        Activity level - Baseline/Home:  Independent    Activity Level - Current:   Independent     Needed?: No    Isolation: No  Infection: Not Applicable  Bariatric?: No    Vital Signs:   Vitals:    01/09/19 2253 01/10/19 0100   BP: (!) 224/126 (!) 178/95   Pulse: 116 84   Resp: 18    Temp: 98.2  F (36.8  C)    TempSrc: Oral    SpO2: 94% 95%   Weight: 61.2 kg (135 lb)    Height: 1.803 m (5' 11\")        Cardiac Rhythm: ,        Pain level:      Is this patient confused?: No   Does this patient have a guardian?  No         If yes, is there guardianship documents in the Epic \"Code/ACP\" activity?  N/A         Guardian Notified?  N/A  Echo - Suicide Severity Rating Scale Completed?  Yes  If yes, what color did the patient score?  White    Patient Report: Initial Complaint: Patient arrived to the ED today with complaints of abdominal pain and shortness of breath. Patient was seen at the Urgent care 5 days ago and was started on antibiotics. Patient states that despite using the antibiotics he continues to feel short of breath and has started to have abdominal pain.   Focused Assessment: Respiratory: patient was placed on amoxicillin for an upper respiratory tract infection and states that he continues to feel short of breath despite the antibiotics. Patient does have COPD.   Cardiac: Hypertension   Neuro: WDL   : WDL   GI: Patient complains of abdominal pain that started in the last day or so and continued to get worse is relieved slightly by passing gas.    Musculoskeletal: WDL   Tests Performed: labs, UA, CT chest abdomen and pelvis with contrast,   Abnormal Results:   Labs Ordered " and Resulted from Time of ED Arrival Up to the Time of Departure from the ED   CBC WITH PLATELETS DIFFERENTIAL - Abnormal; Notable for the following components:       Result Value    WBC 15.9 (*)     RBC Count 4.37 (*)     Hematocrit 38.7 (*)     Absolute Neutrophil 13.1 (*)     Absolute Monocytes 1.4 (*)     All other components within normal limits   D DIMER QUANTITATIVE - Abnormal; Notable for the following components:    D Dimer 9.6 (*)     All other components within normal limits   COMPREHENSIVE METABOLIC PANEL - Abnormal; Notable for the following components:    Glucose 108 (*)     All other components within normal limits   ROUTINE UA WITH MICROSCOPIC - Abnormal; Notable for the following components:    Protein Albumin Urine 10 (*)     RBC Urine 3 (*)     Mucous Urine Present (*)     All other components within normal limits   LIPASE   TROPONIN I   NT PROBNP INPATIENT   LACTIC ACID WHOLE BLOOD   PERIPHERAL IV CATHETER     Treatments provided: none    Family Comments: None    OBS brochure/video discussed/provided to patient/family: N/A              Name of person given brochure if not patient: n/a              Relationship to patient: n/a    ED Medications:   Medications   iopamidol (ISOVUE-370) solution 68 mL (68 mLs Intravenous Given 1/10/19 0132)   Saline flush (84 mLs Intravenous Given 1/10/19 0133)       Drips infusing?:  No    For the majority of the shift this patient was Green.   Interventions performed were none.    Severe Sepsis OR Septic Shock Diagnosis Present: No    To be done/followed up on inpatient unit:  Pain control, continue to monitor    ED NURSE PHONE NUMBER: *[phone]

## 2019-02-18 NOTE — TELEPHONE ENCOUNTER
"Pending Prescriptions:                       Disp   Refills    ADVAIR DISKUS 250-50 MCG/DOSE inhaler [Ph*       0            Sig: INHALE ONE PUFF BY MOUTH TWICE DAILY     Last Written Prescription Date:  11/19/18  Last Fill Quantity: 180,  # refills: 0   Last office visit: 8/30/2018 with prescribing provider:     Future Office Visit:    Requested Prescriptions   Pending Prescriptions Disp Refills     ADVAIR DISKUS 250-50 MCG/DOSE inhaler [Pharmacy Med Name: Advair Diskus Inhalation Aerosol Powder Breath Activated 250-50 MCG/DOSE]  0     Sig: INHALE ONE PUFF BY MOUTH TWICE DAILY    Inhaled Steroids Protocol Passed - 2/17/2019 12:44 PM       Passed - Patient is age 12 or older       Passed - Recent (12 mo) or future (30 days) visit within the authorizing provider's specialty    Patient had office visit in the last 12 months or has a visit in the next 30 days with authorizing provider or within the authorizing provider's specialty.  See \"Patient Info\" tab in inbasket, or \"Choose Columns\" in Meds & Orders section of the refill encounter.             Passed - Medication is active on med list          "

## 2019-02-18 NOTE — TELEPHONE ENCOUNTER
Reason for Call:  Medication or medication refill:    Do you use a [facility]?  Name of the pharmacy and phone number for the current request:    [id number] - [place] [id number] [address]      Name of the medication requested: ALBUTEROL OR PROAIR OR WHATEVER IS APPROPRIATE      Other request: XOPENEX HFA 45 MCG/ACT inhaler IS not available in stock in back order/ ALSO GENERIC IS ON BACK ORDER    Can we leave a detailed message on this number? Not Applicable    Phone number patient can be reached at: Other phone number:      Best Time:     Call taken on 2/18/2019 at 12:22 PM by Rian Bolton

## 2019-02-20 NOTE — TELEPHONE ENCOUNTER
Prescription approved per [facility] Refill Protocol.  Arleen TYSON RN

## 2019-02-20 NOTE — TELEPHONE ENCOUNTER
Current med list: Xopenex HFA 45mcg/act inhaler - 2 puffs every 6 hours as needed for SOB/wheezing/dyspnea #15g with 2 refills last Rx on 12/5/18    Per below pharmacy on back order for this and also it's generic. Pharmacy requesting alternative Rx as patient is almost out.     Please advise    Yareli HOLLOWAY RN

## 2019-02-22 NOTE — TELEPHONE ENCOUNTER
Spoke with PCP  He recommends checking to see if clinic pharmacy carries this  Spoke with pharmacy - they have the Xopenex  Spoke with pt, resent to clinic pharmacy    Yareli HOLLOWAY RN

## 2019-02-22 NOTE — TELEPHONE ENCOUNTER
"Pt is calling in to get a refill of his inhaler.  \"I am almost out\"  \"We have been working on this since Saturday\"  \"What are you going to do about this?\"      Please try to expedite this as pt is becoming rather frustrated and anxious.    "

## 2019-02-28 NOTE — PROGRESS NOTES
"  SUBJECTIVE:   Primo Dorsey is a 78 year old male who presents to clinic today for the following health issues:        Follow up - lung    Patient was hospitalized on January 5 and reports that he has completely recovered and is back to his usual activities.  He has not smoked cigarettes since 12/31!!      Problem list and histories reviewed & adjusted, as indicated.  Additional history: as documented    Current Outpatient Medications   Medication Sig Dispense Refill     ADVAIR DISKUS 250-50 MCG/DOSE inhaler INHALE ONE PUFF BY MOUTH TWICE DAILY  180 Inhaler 1     ASPIRIN 81 MG OR TABS 1 tab po QD (Once per day)       fluconazole (DIFLUCAN) 100 MG tablet 1 tab q3days 3 tablet 0     fluticasone (FLONASE) 50 MCG/ACT spray Spray 1-2 sprays into both nostrils daily 16 g 1     levalbuterol (XOPENEX HFA) 45 MCG/ACT inhaler INHALE TWO PUFFS into the lungs EVERY SIX HOURS AS NEEDED for shortness of breath/dyspnea or wheezing 15 g 1     losartan (COZAAR) 100 MG tablet TAKE ONE TABLET BY MOUTH ONE TIME DAILY  90 tablet 2     order for DME Dog walking three times weekly 1 unit marking on an U-100 insulin syringe 11     simvastatin (ZOCOR) 20 MG tablet TAKE ONE TABLET (20 mg) BY MOUTH DAILY AT BEDTIME 90 tablet 3     Allergies   Allergen Reactions     No Known Drug Allergies        Reviewed and updated as needed this visit by clinical staff  Tobacco  Allergies  Meds       Reviewed and updated as needed this visit by Provider         ROS:  Constitutional, HEENT, cardiovascular, pulmonary, gi and gu systems are negative, except as otherwise noted.    OBJECTIVE:     /83 (BP Location: Right arm, Cuff Size: Adult Regular)   Pulse 84   Temp 96.4  F (35.8  C) (Tympanic)   Ht 1.803 m (5' 11\")   Wt 59.1 kg (130 lb 3.2 oz)   SpO2 95%   BMI 18.16 kg/m    Body mass index is 18.16 kg/m .  GENERAL: healthy, alert and no distress  HEENT throat with white papular confluent outbreak on his soft palate consistent with " thrush  NECK: no adenopathy, no asymmetry, masses, or scars and thyroid normal to palpation  RESP: lungs clear to auscultation - no rales or wheezes, left-sided expiratory rhonchi  CV: regular rate and rhythm, normal S1 S2, no S3 or S4, no murmur, click or rub, no peripheral edema and peripheral pulses strong  ABDOMEN: soft, nontender, no hepatosplenomegaly, no masses and bowel sounds normal  MS: no gross musculoskeletal defects noted, no edema        ASSESSMENT/PLAN:             1. COPD exacerbation (H)  Patient is managing his activities of daily living well and seems to have no problems with cigarette cessation.  - Spirometry, Breathing Capacity: Normal Order, Clinic Performed  - CBC with platelets    2. Essential hypertension  Well-controlled with current therapy    3. Lung mass  On follow-up lung mass with CT.  Patient's spirometry suggest further follow-up is warranted    4. Other acute pulmonary embolism without acute cor pulmonale (H)  Follow-up with CT    5. Other osteoarthritis of spine, cervical region  No change in current therapy    6. Hyperlipidemia LDL goal <130      7. Thrush    - fluconazole (DIFLUCAN) 100 MG tablet; 1 tab q3days  Dispense: 3 tablet; Refill: 0        Ralph Maldonado MD  [employer]

## 2019-03-05 NOTE — TELEPHONE ENCOUNTER
Reason for Call:  Medication or medication refill:    Do you use a [facility]?  Name of the pharmacy and phone number for the current request:      [id number] - [facility] [address]      Name of the medication requested: Something for Thrush or Oral yeast due to Inhalers  The patients mouth is bothering him     Other request:  He needs the pill form because the Liquid gives him a rash  Please call if any questions    Can we leave a detailed message on this number? YES    Phone number patient can be reached at: Home number on file [phone] (home)    Best Time: anytime    Call taken on 3/5/2019 at 4:24 PM by Yessenia Sosa

## 2019-03-05 NOTE — TELEPHONE ENCOUNTER
Reason for Call: Request for an order or referral:    Order or referral being requested: CT Scan    Date needed: at your convenience    Has the patient been seen by the PCP for this problem? YES    Additional comments: Patient said this was discussed at last OV  Dr Maldonado wanted him to do a repeat CT Scan    Phone number Patient can be reached at:  Home number on file [phone] (home)    Best Time:  anytime    Can we leave a detailed message on this number?  YES    Call taken on 3/5/2019 at 4:30 PM by Yessenia Sosa

## 2019-03-06 NOTE — TELEPHONE ENCOUNTER
If the liquid causes a rash, I would not recommend a pill form  I would recommend he schedule an office visit with a team provider to examine the mouth and discuss alternatives to liquid thrush medication

## 2019-03-06 NOTE — TELEPHONE ENCOUNTER
Patient returned call    Scheduled OV with Dr. Ontiveros for tomorrow AM (was unable to come in later - team did not have AM appts tomorrow)    Yareli HOLLOWAY RN

## 2019-03-06 NOTE — TELEPHONE ENCOUNTER
Pt notified and was given the number for [facility] - OV set up     Next 5 appointments (look out 90 days)    Mar 07, 2019 10:40 AM CST  [facility] [address] [facility] [address]  [phone]   Mar 12, 2019  7:45 AM CDT  [facility] [address] [facility] [address]  [phone]        Yareli HOLLOWAY RN

## 2019-03-06 NOTE — TELEPHONE ENCOUNTER
I ordered the CT scan  In January he had a CT in the ER that looked concerning   For his mouth symptoms and to follow up on his CT scan finding he should schedule an office visit with a provider in the clinic to review his CT finding and help him with his thrush

## 2019-03-07 NOTE — PROGRESS NOTES
SUBJECTIVE:   Primo Dorsey is a 78 year old male who presents to clinic today for the following health issues:      Chief Complaint   Patient presents with     Mouth/Lip Problem     Thrush       HPI:   Patient Primo Dorsey is a very pleasant 78 year old male with history of COPD who presents to Internal Medicine clinic today for evaluation of recent thrush symptoms of the mouth and tongue. He was not able to tolerate the Nystatin rinse medication for thrush treatment due to side effect of a rash. Regarding the patient's COPD, the patient is compliant with his Advair medication. He denies any chest pain, headaches, fever or chills. Regarding the patient's chronic COPD, the patient requests a pulmonary clinic referral at this time for further evaluation.      Current Medications:     Current Outpatient Medications   Medication Sig Dispense Refill     ADVAIR DISKUS 250-50 MCG/DOSE inhaler INHALE ONE PUFF BY MOUTH TWICE DAILY  180 Inhaler 1     ASPIRIN 81 MG OR TABS 1 tab po QD (Once per day)       fluconazole (DIFLUCAN) 100 MG tablet 1 tab q3days 3 tablet 1     fluticasone (FLONASE) 50 MCG/ACT spray Spray 1-2 sprays into both nostrils daily 16 g 1     levalbuterol (XOPENEX HFA) 45 MCG/ACT inhaler INHALE TWO PUFFS into the lungs EVERY SIX HOURS AS NEEDED for shortness of breath/dyspnea or wheezing 15 g 1     losartan (COZAAR) 100 MG tablet TAKE ONE TABLET BY MOUTH ONE TIME DAILY  90 tablet 2     order for DME Dog walking three times weekly 1 unit marking on an U-100 insulin syringe 11     simvastatin (ZOCOR) 20 MG tablet TAKE ONE TABLET (20 mg) BY MOUTH DAILY AT BEDTIME 90 tablet 3         Allergies:      Allergies   Allergen Reactions     No Known Drug Allergies             Past Medical History:     Past Medical History:   Diagnosis Date     COPD (chronic obstructive pulmonary disease) (H)      Hypertension          Past Surgical History:     Past Surgical History:   Procedure Laterality Date     APPENDECTOMY        EYE SURGERY      bilateral cataract     HERNIORRHAPHY UMBILICAL N/A 2016    Procedure: HERNIORRHAPHY UMBILICAL;  Surgeon: Ariel Kaye MD;  Location: [facility]     LAPAROSCOPIC HERNIORRHAPHY INGUINAL BILATERAL Bilateral 2016    Procedure: LAPAROSCOPIC HERNIORRHAPHY INGUINAL BILATERAL;  Surgeon: Ariel Kaye MD;  Location: [facility]     TONSILLECTOMY           Family Medical History:     Family History   Problem Relation Age of Onset     Cerebrovascular Disease Father         age 60     Allergies Daughter      Cancer Brother         lung and stomach     Hypertension Sister      Hypertension Sister      Hypertension Brother      C.A.D. Brother         cabg     Diabetes No family hx of      Cancer - colorectal No family hx of      Prostate Cancer No family hx of          Social History:     Social History     Socioeconomic History     Marital status: Single     Spouse name: Not on file     Number of children: Not on file     Years of education: Not on file     Highest education level: Not on file   Occupational History     Not on file   Social Needs     Financial resource strain: Not on file     Food insecurity:     Worry: Not on file     Inability: Not on file     Transportation needs:     Medical: Not on file     Non-medical: Not on file   Tobacco Use     Smoking status: Former Smoker     Packs/day: 0.75     Years: 45.00     Pack years: 33.75     Types: Cigarettes     Last attempt to quit: 2018     Years since quittin.1     Smokeless tobacco: Never Used   Substance and Sexual Activity     Alcohol use: No     Alcohol/week: 0.0 oz     Comment: not for 10 years     Drug use: No     Sexual activity: Not Currently     Partners: Female   Lifestyle     Physical activity:     Days per week: Not on file     Minutes per session: Not on file     Stress: Not on file   Relationships     Social connections:     Talks on phone: Not on file     Gets together: Not on file     Attends [employer]  "service: Not on file     Active member of club or organization: Not on file     Attends meetings of clubs or organizations: Not on file     Relationship status: Not on file     Intimate partner violence:     Fear of current or ex partner: Not on file     Emotionally abused: Not on file     Physically abused: Not on file     Forced sexual activity: Not on file   Other Topics Concern     Parent/sibling w/ CABG, MI or angioplasty before 65F 55M? Not Asked   Social History Narrative     Not on file           Review of System:     Constitutional: Negative for fever or chills  Skin: Negative for rashes  HEENT: Negative for nasal congestion, sore throat, positive for recent thrush symptoms  Respiratory: No shortness of breath, dyspnea on exertion, cough, or hemoptysis. Positive for chronic COPD  Cardiovascular: Negative for chest pain  Gastrointestinal: Negative for nausea, vomiting  Genitourinary: Negative for dysuria, hematuria  Musculoskeletal: Negative for myalgias  Neurologic: Negative for headaches  Psychiatric: Negative for depression, anxiety  Hematologic/Lymphatic/Immunologic: Negative  Endocrine: Negative  Behavioral: Negative for tobacco use       Physical Exam:   /75 (BP Location: Right arm, Patient Position: Sitting, Cuff Size: Adult Regular)   Pulse 69   Temp 96.9  F (36.1  C) (Oral)   Ht 1.803 m (5' 11\")   Wt 58.5 kg (129 lb)   SpO2 96%   BMI 17.99 kg/m      GENERAL:  alert and no distress  EYES: eyes grossly normal to inspection, and conjunctivae and sclerae normal  HENT: Normocephalic atraumatic. Thrush symptoms noted of the mouth and tongue  NECK: supple  RESP: lungs clear to auscultation, no cough or wheezing  CV: regular rate and rhythm, normal S1 S2  LYMPH: no peripheral edema   ABDOMEN: nondistended  MS: no gross musculoskeletal defects noted  SKIN: no suspicious lesions or rashes  NEURO: Alert & Oriented x 3.   PSYCH: mentation appears normal, affect normal        Diagnostic Test Results: "     Diagnostic Test Results:  Results for orders placed or performed in visit on 02/28/19   Spirometry, Breathing Capacity: Normal Order, Clinic Performed   Result Value Ref Range    FEV-1      FVC      FEV1/FVC      FEF 25/75     CBC with platelets   Result Value Ref Range    WBC 11.4 (H) 4.0 - 11.0 10e9/L    RBC Count 4.43 4.4 - 5.9 10e12/L    Hemoglobin 13.1 (L) 13.3 - 17.7 g/dL    Hematocrit 39.8 (L) 40.0 - 53.0 %    MCV 90 78 - 100 fl    MCH 29.6 26.5 - 33.0 pg    MCHC 32.9 31.5 - 36.5 g/dL    RDW 13.8 10.0 - 15.0 %    Platelet Count 224 150 - 450 10e9/L       ASSESSMENT/PLAN:       (J44.9) Chronic obstructive pulmonary disease, unspecified COPD type (H)  (primary encounter diagnosis)  Comment: no signs of acute on chronic COPD exacerbation symptoms at this time. Patient is interested in establishing outpatient pulmonary clinic follow up going forward for further evaluation of his chronic COPD  Plan: PULMONARY MEDICINE REFERRAL, continue current inhaler medications including Advair      (B37.0) Thrush  Comment: recent thrush symptoms likely due to residual steroid from the Advair medication  Plan: fluconazole (DIFLUCAN) 100 MG tablet. I have advised the patient to adequately rinse his mouth and tongue with water after taking the inhalation doses from the Advair medication to prevent recurrent thrush.      Follow Up Plan:     Patient is instructed to return to Internal Medicine clinic for follow-up visit in 1 month.        Ayesha Ontiveros MD  Internal Medicine  [facility]

## 2019-03-08 NOTE — LETTER
[facility]  [address] [facility]  [address]  [place]  [address]  Tel: [phone]    March 14, 2019    Primo Dorsey  [address] [address] [facility] [address]        Dear Mr. Dorsey,    Enclosed are your results.    If you have any further questions or problems, please contact our office.      Sincerely,    Gerry Caballero MD/ Ileana Ardon CMA  Results for orders placed or performed during the hospital encounter of 03/08/19   CT Chest (PE) Abdomen Pelvis w Contrast    Narrative    CT CHEST PE ABDOMEN AND PELVIS WITH CONTRAST   3/8/2019 8:36 AM     HISTORY: Enlarged lymph nodes, chest, axilla. Lung mass.    TECHNIQUE:  CT of chest PE protocol and CT abdomen and pelvis with 65  mL Isovue 370 IV. Radiation dose for this scan was reduced using  automated exposure control, adjustment of the mA and/or kV according  to patient size, or iterative reconstruction technique.     COMPARISON: 1/10/2019    FINDINGS:     CHEST: Resolution of small to filling defect within a subsegmental  pulmonary artery in the posterior aspect of the right upper lobe.  Small linear hypodensity is seen within a subsegmental pulmonary  artery in the left upper lobe, likely representing chronic pulmonary  embolus, not significantly changed. No acute pulmonary embolus. The  thoracic aorta is patent. Severe emphysematous changes are noted  throughout both lungs. No significant change in 3 cm spiculated  nodular mass in the superior segment of the left lower lobe (series 8,  image 65). No significant change in a moderate-sized area of confluent  and patchy consolidation with associated volume loss in the inferior  medial aspect of the left upper lobe surrounding the right pulmonary  artery. Scattered subcentimeter nodules are noted within both lungs;  for example, there is a 7 mm nodule in the right lung apex (series 8,  image 26). Interval increased size and number of prevascular lymph  nodes; for example, there is a group of  enlarged lymph nodes posterior  to the mediastinum measuring up to 2.0 cm (series 7, image 57), these  are new when compared to the prior exam. Prevascular lymph node  measuring 2.9 cm (series 7, image 61), previously 1.6 cm. AP window  lymph node measuring 2.1 cm, previously 1.6 cm. Trace left pleural  effusion. Significantly increased in size and number of left axillary  lymph nodes; for example, there is a lymph node measuring 2.7 x 2.0  cm, previously 1.4 x 0.9 cm. Increased size and number of multiple  supraclavicular lymph nodes    ABDOMEN: Subcentimeter hypodensity in the lateral segment of the left  lobe, too small to characterize. 4.7 x 2.6 cm mass in the right  adrenal gland, previously 3.5 x 2.2 cm. There is a mildly enhancing  mass in the lateral aspect of the left kidney measuring 3.8 x 2.6 cm,  previously 3.3 x 2.0 cm. Additional left renal cysts are not  significantly changed. The left adrenal gland, spleen, gallbladder and  pancreas show no focal abnormality. Hepatic and portal veins are  patent. No intraperitoneal free air or free fluid.    Small and large bowel are normal in caliber without evidence of  obstruction. Bladder is normal. No abdominal aortic aneurysm.  Atherosclerotic disease is seen throughout the abdominal aorta.  Resolution of bowel wall thickening along the cecum and ascending  colon.    Bones: No suspicious bony lesions.      Impression    IMPRESSION:  1. No change in 3 cm mass in the superior segment of the left lower  lobe. No change in moderate-sized area of confluent and patchy  consolidation of the left upper lobe, concerning for malignancy.  2. Significantly increased size and number of mediastinal,  prevascular, axillary and supraclavicular lymph nodes, concerning for  malignancy.  3. Resolution of subsegmental right acute pulmonary embolus. No acute  pulmonary embolus noted on today's exam.  4. Increased size of right adrenal mass, concerning for worsening  metastatic  disease.    WILFRED MCFARLANE DO               Enclosure: Lab Results

## 2019-03-09 NOTE — RESULT ENCOUNTER NOTE
I discussed this CT finding with Dr. Maldonado who has an appointment to see this patient on Tuesday of next week and he is aware that the CT suggests a lung neoplasm with possible adrenal met

## 2019-03-09 NOTE — PROGRESS NOTES
HPI  2019    HPI: Primo Dorsey is a 78 year old male with hx COPD who complains of moderate SOB, wheezing, & more productive cough than usual onset last night. Symptoms are constant in duration. Pt takes Advair for his COPD and has been taking this as directed- he also used his Xopenex this AM. He reports he is feeling slightly better this AM after using his inhalers. Denies fever/chills, HA, CP,  abd pain, N/V/D, rash, or any other symptoms. Patient denies sick contacts.    Of note, pt had a CT chest yesterday which suggests lung neoplasm with possible adrenal met- he has an appt in 3 days to discuss these results with his PCP.  Pt also has thrush secondary to Advair use. Was prescribed 3 day course of fluconazole 2 days ago- hasn't started this yet.  Pt quit smoking about 2 months ago.      Past Medical History:   Diagnosis Date     COPD (chronic obstructive pulmonary disease) (H)      Hypertension      Past Surgical History:   Procedure Laterality Date     APPENDECTOMY       EYE SURGERY      bilateral cataract     HERNIORRHAPHY UMBILICAL N/A 2016    Procedure: HERNIORRHAPHY UMBILICAL;  Surgeon: Ariel Kaye MD;  Location: [facility]     LAPAROSCOPIC HERNIORRHAPHY INGUINAL BILATERAL Bilateral 2016    Procedure: LAPAROSCOPIC HERNIORRHAPHY INGUINAL BILATERAL;  Surgeon: Ariel Kaye MD;  Location: [facility]     TONSILLECTOMY       Social History     Tobacco Use     Smoking status: Former Smoker     Packs/day: 0.75     Years: 45.00     Pack years: 33.75     Types: Cigarettes     Last attempt to quit: 2018     Years since quittin.1     Smokeless tobacco: Never Used   Substance Use Topics     Alcohol use: No     Alcohol/week: 0.0 oz     Comment: not for 10 years     Drug use: No     Patient Active Problem List   Diagnosis     HL (hearing loss)     HTN (hypertension)     Hyperlipidemia LDL goal <130     Advance Care Planning     Tobacco use disorder     DJD (degenerative joint  disease) of cervical spine     Bilateral inguinal hernia without obstruction or gangrene, recurrence not specified     Other emphysema (H)     Chronic seasonal allergic rhinitis, unspecified trigger     Family History   Problem Relation Age of Onset     Cerebrovascular Disease Father         age 60     Allergies Daughter      Cancer Brother         lung and stomach     Hypertension Sister      Hypertension Sister      Hypertension Brother      C.A.D. Brother         cabg     Diabetes No family hx of      Cancer - colorectal No family hx of      Prostate Cancer No family hx of         Problem list, Medication list, Allergies, and Medical/Social/Surgical histories reviewed in [facility] and updated as appropriate.      Review of Systems   Constitutional: Negative for chills and fever.   Respiratory: Positive for cough, shortness of breath and wheezing.    Cardiovascular: Negative for chest pain.   Gastrointestinal: Negative for abdominal pain, diarrhea, nausea and vomiting.   Skin: Negative for rash.   Neurological: Negative for focal weakness and headaches.   All other systems reviewed and are negative.        Physical Exam   Constitutional: He is oriented to person, place, and time.   HENT:   Head: Normocephalic and atraumatic.   Mouth/Throat: Posterior oropharyngeal erythema present.   Slight white patches to tongue   Cardiovascular: Normal rate, regular rhythm and normal heart sounds.   Pulmonary/Chest: Effort normal. He has wheezes.   Musculoskeletal: Normal range of motion.   Neurological: He is alert and oriented to person, place, and time.   Skin: Skin is warm and dry.   Nursing note and vitals reviewed.    Vital Signs  /86   Pulse 85   Temp 97.5  F (36.4  C) (Oral)   SpO2 96%      Diagnostic Test Results:  none     ASSESSMENT/PLAN      ICD-10-CM    1. COPD exacerbation (H) J44.1 ipratropium - albuterol 0.5 mg/2.5 mg/3 mL (DUONEB) neb solution 3 mL     azithromycin (ZITHROMAX) 250 MG tablet     ipratropium  - albuterol 0.5 mg/2.5 mg/3 mL (DUONEB) 0.5-2.5 (3) MG/3ML neb solution     predniSONE (DELTASONE) 20 MG tablet   2. Essential hypertension I10    3. Thrush B37.0       Pt not in resp distress, O2 sat 96%. Wheezing on exam- Duoneb given in clinic with improvement and decreased wheezing. Also reports improvement in SOB.  Will Rx short course of prednisone- pt informed this may cause thrush to worsen, but feel benefits outweigh risks at this time in order to prevent worsening of patient's respiratory status. Advised he begin taking the fluconazole prescribed to him 2 days go. Also Rx azithromycin and Duoneb solution- pt has nebulizer at home. Continue Advair.  BP elevated today- f/u with PCP regarding this.    Addendum:  Pharmacist called to notify of interaction between azithromycin & fluconazole wit increased risk of QT prolongation. Cancelled Rx for azithromycin and instead gave verbal Rx for doxycycline 100 mg bid x 7 days, disp 14 tabs.      I have discussed any lab or imaging results, the patient's diagnosis, and my plan of treatment with the patient and/or family. Patient is aware to come back in if with worsening symptoms or if no relief despite treatment plan.  Patient voiced understanding and had no further questions.       Follow Up: Return in about 3 days (around 3/12/2019) for recheck with PCP.    EMMANUEL Schmitt, PA-C  [facility]

## 2019-03-09 NOTE — TELEPHONE ENCOUNTER
Roma with [facility] calling to get a VO from the provider regarding Harolds new prescription for Z ELIA.     RN connected Roma with provider KARLENE Jimenez through a warm conference.   Tresa Khan RN/CHAVO      Reason for Disposition    Pharmacy calling with prescription questions and triager unable to answer question    Protocols used: MEDICATION QUESTION CALL-ADULT-

## 2019-03-12 NOTE — TELEPHONE ENCOUNTER
PCP see below   Pt was seen today for OV   Was he to be prescribed something for insomnia?     Please advise     Yareli HOLLOWAY RN

## 2019-03-12 NOTE — PROGRESS NOTES
"  SUBJECTIVE:   Primo Dorsye is a 78 year old male who presents to clinic today for the following health issues:      ED/UC Followup:    Facility:  ED UC  Date of visit: 03/09/19  Reason for visit: COPD exacerbation  Current Status: pt has not started the diflucan b/c he would have to stop the statin; states breathing has been worse since the CT scan     Coughing  Patient reports that he had a \"bad night\" last night. He states that \"he could not get the fleam up\", he reports of some blood in his fleam and notes that it \" kiki cleared up\".   SOB  Patient reports that when he coughs it feels like he is \"gasping for a breath\". He denies using inhaler last night, used warm tea for relief. He also notes that when he has lots of stool in him and bending over too far,  he notices difficulty breathing. He thinks that his nebulizer is plugged up.       Problem list and histories reviewed & adjusted, as indicated.  Additional history: as documented    Patient Active Problem List   Diagnosis     HL (hearing loss)     HTN (hypertension)     Hyperlipidemia LDL goal <130     Advance Care Planning     Tobacco use disorder     DJD (degenerative joint disease) of cervical spine     Bilateral inguinal hernia without obstruction or gangrene, recurrence not specified     Other emphysema (H)     Chronic seasonal allergic rhinitis, unspecified trigger     Past Surgical History:   Procedure Laterality Date     APPENDECTOMY       EYE SURGERY      bilateral cataract     HERNIORRHAPHY UMBILICAL N/A 5/12/2016    Procedure: HERNIORRHAPHY UMBILICAL;  Surgeon: Ariel Kaye MD;  Location: [facility]     LAPAROSCOPIC HERNIORRHAPHY INGUINAL BILATERAL Bilateral 5/12/2016    Procedure: LAPAROSCOPIC HERNIORRHAPHY INGUINAL BILATERAL;  Surgeon: Ariel Kaye MD;  Location: [facility]     TONSILLECTOMY         Social History     Tobacco Use     Smoking status: Former Smoker     Packs/day: 0.75     Years: 45.00     Pack years: 33.75     " Types: Cigarettes     Last attempt to quit: 2018     Years since quittin.1     Smokeless tobacco: Never Used   Substance Use Topics     Alcohol use: No     Alcohol/week: 0.0 oz     Comment: not for 10 years     Family History   Problem Relation Age of Onset     Cerebrovascular Disease Father         age 60     Allergies Daughter      Cancer Brother         lung and stomach     Hypertension Sister      Hypertension Sister      Hypertension Brother      C.A.D. Brother         cabg     Diabetes No family hx of      Cancer - colorectal No family hx of      Prostate Cancer No family hx of          Current Outpatient Medications   Medication Sig Dispense Refill     ADVAIR DISKUS 250-50 MCG/DOSE inhaler INHALE ONE PUFF BY MOUTH TWICE DAILY  180 Inhaler 1     ASPIRIN 81 MG OR TABS 1 tab po QD (Once per day)       azithromycin (ZITHROMAX) 250 MG tablet Two tablets first day, then one tablet daily for four days. 6 tablet 0     fluticasone (FLONASE) 50 MCG/ACT spray Spray 1-2 sprays into both nostrils daily 16 g 1     ipratropium - albuterol 0.5 mg/2.5 mg/3 mL (DUONEB) 0.5-2.5 (3) MG/3ML neb solution Take 1 vial (3 mLs) by nebulization every 6 hours as needed for shortness of breath / dyspnea or wheezing 30 vial 0     levalbuterol (XOPENEX HFA) 45 MCG/ACT inhaler INHALE TWO PUFFS into the lungs EVERY SIX HOURS AS NEEDED for shortness of breath/dyspnea or wheezing 15 g 1     losartan (COZAAR) 100 MG tablet TAKE ONE TABLET BY MOUTH ONE TIME DAILY  90 tablet 2     order for DME Dog walking three times weekly 1 unit marking on an U-100 insulin syringe 11     predniSONE (DELTASONE) 20 MG tablet Take 20 mg by mouth 2 times daily for 5 days. 10 tablet 0     simvastatin (ZOCOR) 20 MG tablet TAKE ONE TABLET (20 mg) BY MOUTH DAILY AT BEDTIME 90 tablet 3     fluconazole (DIFLUCAN) 100 MG tablet 1 tab q3days (Patient not taking: Reported on 3/12/2019) 3 tablet 1       BP Readings from Last 3 Encounters:   19 181/87  "  03/09/19 178/86   03/07/19 166/75    Wt Readings from Last 3 Encounters:   03/12/19 59 kg (130 lb)   03/07/19 58.5 kg (129 lb)   02/28/19 59.1 kg (130 lb 3.2 oz)                    Reviewed and updated as needed this visit by clinical staff  Allergies  Meds       Reviewed and updated as needed this visit by Provider         ROS:  ENT/MOUTH: POSITIVE for ear, mouth and throat problems  RESP: POSITIVE for significant cough or SOB  GI: NEGATIVE for nausea, abdominal pain, heartburn, or change in bowel habits        This document serves as a record of the services and decisions personally performed and made by Ralph Maldonado MD. It was created on his behalf by Annemarie Vail, a trained medical scribe. The creation of this document is based on the provider's statements to the medical scribe.  Annemarie Vail March 12, 2019 8:12 AM      OBJECTIVE:     /87 (BP Location: Left arm, Cuff Size: Adult Regular)   Pulse 73   Temp 96  F (35.6  C) (Oral)   Ht 1.803 m (5' 11\")   Wt 59 kg (130 lb)   SpO2 97%   BMI 18.13 kg/m    Body mass index is 18.13 kg/m .    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    ASSESSMENT/PLAN:     (I10) Essential hypertension  (primary encounter diagnosis)  Comment: increased losartan dose   Plan: Continue with the same medications without change    (J44.9) Chronic obstructive pulmonary disease, unspecified COPD type (H)  Comment:discussed symptoms, " refill  Plan: albuterol (PROAIR HFA/PROVENTIL HFA/VENTOLIN         HFA) 108 (90 Base) MCG/ACT inhaler 2 puff         (F17.200) Tobacco use disorder  Comment: former smoker, quit date 12/31/18    (H91.90) Hearing loss, unspecified hearing loss type, unspecified laterality  Comment:     (R91.8) Lung mass  Comment: reviewed CT scan   Plan: Reviewed the nature of his lung cancer with a low likelihood of cure however he should consider the possibility of palliative therapy.  Patient reports that he will take this under consideration in the next step would be to make a tissue diagnosis.  Will review CT scan with radiology to  follow-up with the simplest approach possible    Follow- up: On lung cancer to recheck cancerous cells and evaluate treatment plan.  Recommendations: Advised to discontinue Asprin, Rescue inhaler/ nebulizer - can use 4x daily     The information in this document, created by the medical scribe for me, accurately reflects the services I personally performed and the decisions made by me. I have reviewed and approved this document for accuracy prior to leaving the patient care area.  March 12, 2019 8:08 AM    Ralph Maldonado MD  [facility]

## 2019-03-12 NOTE — TELEPHONE ENCOUNTER
Reason for Call:  Other prescription    Detailed comments: patient is very anxious about getting a medication to help him sleep tonight,  And called again to see if anything can be done.      Phone Number Patient can be reached at: Home number on file [phone] (home)    Best Time: asap    Can we leave a detailed message on this number? YES    Call taken on 3/12/2019 at 6:34 PM by Brenda Thomas

## 2019-03-12 NOTE — PROGRESS NOTES
"  SUBJECTIVE:   Primo Dorsey is a 78 year old male who presents to clinic today for the following health issues:      Recheck Thrush--pt has not taken the zithromax b/c he would have to stop the statin according to the pharmacist; pt also states that his breathing has become worse since the CT scan    {additional problems for provider to add:638821}    Problem list and histories reviewed & adjusted, as indicated.  Additional history: {NONE - AS DOCUMENTED:558101::\"as documented\"}    {HIST REVIEW/ LINKS 2:465676}    Reviewed and updated as needed this visit by clinical staff       Reviewed and updated as needed this visit by Provider         {PROVIDER CHARTING PREFERENCE:980479}  "

## 2019-03-12 NOTE — TELEPHONE ENCOUNTER
Reason for Call:  Medication or medication refill:    Do you use a [facility]?  Name of the pharmacy and phone number for the current request:  [facility] #[id number] - [place] [id number]      Name of the medication requested: Sleep Medication    Other request: Pt states that based off the results he has he is requesting a medication to help him sleep    Can we leave a detailed message on this number? YES    Phone number patient can be reached at: Home number on file [phone] (home)    Best Time: any    Call taken on 3/12/2019 at 4:15 PM by Mai Pollock

## 2019-03-13 NOTE — TELEPHONE ENCOUNTER
Clinic Action Needed: Yes. Please call patient at [phone].      Reason for Call: Patient calling reporting Dr. Maldonado was going to call in medication for sleep for patient. RN called [facility] in [place] and they stated they have not received medication prescription. Patient states he will like all his medications to be sent to [facility] because it is close to him and he goes there all the time. This is the second time patient has called to follow up with this medication request.      Name of the pharmacy and phone number for the current request:  [facility] #[id number] - [place], [place] - [address] [phone]     Routed to: Dr. Maldonado's Nurse Pool.      Philipp Collazo RN  [facility]     Reason for Disposition    Caller requesting a NON-URGENT new prescription or refill and triager unable to refill per unit policy    Protocols used: MEDICATION QUESTION CALL-ADULT-

## 2019-03-13 NOTE — TELEPHONE ENCOUNTER
Clinic Action Needed: Yes. Please call patient at [phone].     Reason for Call: Patient calling reporting Dr. Maldonado was going to call in medication for sleep for patient. RN called [facility] in [place] and they stated they have not received medication for patient. Patient tates he will like all his medications to be sent to [facility] because it is close to him and he goes there all the time.     Name of the pharmacy and phone number for the current request:  [facility] #[id number] - [place], [place] - [address] [phone]    Routed to: Dr. Maldonado's Nurse Pool.     Philipp Collazo, RN  [facility]

## 2019-03-13 NOTE — TELEPHONE ENCOUNTER
Dr. Maldonado called in prescription of Ativan to [facility]. Patient was informed of this information that was relayed by Dr. Maldonado to patient.    Mandeep Ford CMA on 3/13/2019 at 9:43 AM

## 2019-03-13 NOTE — TELEPHONE ENCOUNTER
Clinic Action Needed: Yes. Please call patient at [phone].     Reason for Call: Patient calling reporting Dr. Maldonado was going to call in medication for sleep for patient. RN called [facility] in [place] and they stated they have not received medication for patient. States he will like all his medications to be sent to [facility] because it is close to him and he goes there all the time.     Name of the pharmacy and phone number for the current request:  [facility] #[id number] - [facility] [address] [phone]    Routed to: Dr. Maldonado's Nurse Pool.     Philipp Collazo, RN  [facility]       Reason for Disposition    Caller requesting a NON-URGENT new prescription or refill and triager unable to refill per unit policy    Protocols used: MEDICATION QUESTION CALL-ADULT-

## 2019-03-14 NOTE — TELEPHONE ENCOUNTER
The patient spoke with the pharmacy 45 minutes ago and they said they did not get a verbal and the patient is very Upset that he has to wait this long and keeps having to yaritza down his RX    He said Verbal permission to give his daughter any information  Please call him to confirm that this is at [facility]   [phone] Number patient can be reached

## 2019-03-14 NOTE — TELEPHONE ENCOUNTER
Clinic Action Needed: Yes. Please call patient at [phone].      Reason for Call: Patient calling reporting Dr. Maldonado was going to call in medication for sleep for patient. RN called [facility] in [place] and they stated they have not received medication prescription. Patient states he will like all his medications to be sent to [facility] because it is close to him and he goes there all the time. This is the second time patient has called to follow up with this medication request.      Name of the pharmacy and phone number for the current request:  [facility] #[id number] - [place], [place] - [address] [phone]     Routed to: Dr. Maldonado's Nurse Pool.      Philipp Collazo RN  [facility]

## 2019-03-14 NOTE — TELEPHONE ENCOUNTER
We have verification that  Edwige Dedra was the recipient of the Rx.  I called pharmacy and they confirmed.  Spoke to patient.  He typically uses [facility], but will pick this Rx up at  today..  Steph Ames RN on 3/14/2019 at 1:56 PM

## 2019-03-14 NOTE — TELEPHONE ENCOUNTER
Verbal order given to [facility] pharmacy following the written order from Dr. Maldonado. [facility] will get started on filling medication and will call patient when ready to .    Mandeep Ford CMA on 3/14/2019 at 9:25 AM

## 2019-03-15 PROBLEM — R91.8 LUNG MASS: Status: ACTIVE | Noted: 2019-01-01

## 2019-03-15 PROBLEM — F41.9 ANXIETY: Status: ACTIVE | Noted: 2019-01-01

## 2019-03-15 NOTE — PROGRESS NOTES
HPI      SUBJECTIVE:   Primo Dorsey is a 78 year old male who presents to clinic today for the following health issues:    Chief Complaint   Patient presents with     Respiratory Problems     Patient requests portable oxygen. Has not used Oxygen at home.  Pt said had some recent med changes and non favorable CT results.  Using inhaler but not the nebulizer because doesn't feel machine is working        Pt is in need of home oxygen for decrease saturation with ambulation due to COPD that started at least 7 years ago. This is a chronic condition     Patient has been assessed for Home Oxygen needs. Oxygen readings:    *Pulse oximetry (SpO2) = 93% on room air at rest while awake.    *SpO2 improved to 96% on 2liters/minute at rest.    *SpO2 = 86% on room air during activity/with exercise.    *SpO2 improved to 95% on 2liters/minute during activity/with exercise.      Has been having more SOB, with activity gasps for air   Some stuffy nose  No fevers   Coughing a lot which is chronic   Stopped smoking 2 months ago   Took ativan for the first time last night and that really helped him sleep       Past Medical History:   Diagnosis Date     COPD (chronic obstructive pulmonary disease) (H)      Hypertension      Family History   Problem Relation Age of Onset     Cerebrovascular Disease Father         age 60     Allergies Daughter      Cancer Brother         lung and stomach     Hypertension Sister      Hypertension Sister      Hypertension Brother      C.A.D. Brother         cabg     Diabetes No family hx of      Cancer - colorectal No family hx of      Prostate Cancer No family hx of      Past Surgical History:   Procedure Laterality Date     APPENDECTOMY       EYE SURGERY      bilateral cataract     HERNIORRHAPHY UMBILICAL N/A 5/12/2016    Procedure: HERNIORRHAPHY UMBILICAL;  Surgeon: Ariel Kaye MD;  [facility]: [facility]     LAPAROSCOPIC HERNIORRHAPHY INGUINAL BILATERAL Bilateral 5/12/2016    Procedure:  LAPAROSCOPIC HERNIORRHAPHY INGUINAL BILATERAL;  Surgeon: Ariel Kaye MD;  Location:  [facility]     TONSILLECTOMY       Social History     Tobacco Use     Smoking status: Former Smoker     Packs/day: 0.75     Years: 45.00     Pack years: 33.75     Types: Cigarettes     Last attempt to quit: 2018     Years since quittin.2     Smokeless tobacco: Never Used   Substance Use Topics     Alcohol use: No     Alcohol/week: 0.0 oz     Comment: not for 10 years     Current Outpatient Medications   Medication Sig Dispense Refill     ADVAIR DISKUS 250-50 MCG/DOSE inhaler INHALE ONE PUFF BY MOUTH TWICE DAILY  180 Inhaler 1     ASPIRIN 81 MG OR TABS 1 tab po QD (Once per day)       fluconazole (DIFLUCAN) 100 MG tablet 1 tab q3days (Patient not taking: Reported on 3/12/2019) 3 tablet 1     fluticasone (FLONASE) 50 MCG/ACT spray Spray 1-2 sprays into both nostrils daily 16 g 1     ipratropium - albuterol 0.5 mg/2.5 mg/3 mL (DUONEB) 0.5-2.5 (3) MG/3ML neb solution Take 1 vial (3 mLs) by nebulization every 6 hours as needed for shortness of breath / dyspnea or wheezing 30 vial 0     levalbuterol (XOPENEX HFA) 45 MCG/ACT inhaler INHALE TWO PUFFS into the lungs EVERY SIX HOURS AS NEEDED for shortness of breath/dyspnea or wheezing (Patient not taking: Reported on 3/15/2019) 15 g 1     LORazepam (ATIVAN) 0.5 MG tablet Take 1 tablet (0.5 mg) by mouth At Bedtime 30 tablet 0     losartan (COZAAR) 100 MG tablet TAKE ONE TABLET BY MOUTH ONE TIME DAILY  90 tablet 2     order for DME Dog walking three times weekly 1 unit marking on an U-100 insulin syringe 11     simvastatin (ZOCOR) 20 MG tablet TAKE ONE TABLET (20 mg) BY MOUTH DAILY AT BEDTIME 90 tablet 3     Allergies   Allergen Reactions     No Known Drug Allergies      Nystatin        Reviewed and updated as needed this visit by clinical staff and provider     ROS  Detailed as above     /71 (BP Location: Left arm, Patient Position: Chair, Cuff Size: Adult Regular)    "Pulse 79   Temp 97.8  F (36.6  C) (Oral)   Ht 1.803 m (5' 11\")   Wt 58.1 kg (128 lb)   SpO2 93%   BMI 17.85 kg/m     Physical Exam   Constitutional: He appears well-developed.   HENT:   Head: Normocephalic.   Eyes: Conjunctivae are normal.   Pulmonary/Chest: Effort normal.   Appears SOB with exertion  Occasional moist cough    Neurological: He is alert.   Psychiatric: He has a normal mood and affect. Judgment normal.       Assessment and Plan:       ICD-10-CM    1. Chronic obstructive pulmonary disease, unspecified COPD type (H) J44.9 order for DME   2. ROBLES (dyspnea on exertion) R06.09      Worsening activity tolerance. O2 sats are low today with activity on room air. Home oxygen ordered.   He also had a biopsy ordered by PCP so he is given contact info.   He should follow-up with pcp in a couple weeks     AUBRIE Dallas, CNP  [facility]    "

## 2019-03-15 NOTE — PATIENT INSTRUCTIONS
Please call [facility] to schedule your test: [phone]   This is for the biopsy     You should get a call to get set up for home oxygen

## 2019-03-16 PROBLEM — Z86.73 HISTORY OF EMBOLIC STROKE WITHOUT RESIDUAL DEFICITS: Status: ACTIVE | Noted: 2019-01-01

## 2019-03-16 NOTE — PHARMACY-ADMISSION MEDICATION HISTORY
Admission medication history interview status for the 3/16/2019  admission is complete. See EPIC admission navigator for prior to admission medications     Medication history source reliability:Moderate    Actions taken by pharmacist (provider contacted, etc):None     Additional medication history information not noted on PTA med list :None    Medication reconciliation/reorder completed by provider prior to medication history? No    Time spent in this activity: 20 minutes    Patient recently diagnosed with lung cancer, stopped taking aspirin 81mg daily.  He took 2 doses of fluconazole for thrush in month on 3/12 and 3/15, one dose left. While he is on fluconazole, he doesn't take any simvastatin. He was told to resume simvastatin after finishing fluconazole.   Losartan 100mg was increased to BID per PCP.     Prior to Admission medications    Medication Sig Last Dose Taking? Auth Provider   ADVAIR DISKUS 250-50 MCG/DOSE inhaler INHALE ONE PUFF BY MOUTH TWICE DAILY  3/16/2019 at am Yes Ralph Maldonado MD   albuterol (PROAIR HFA/PROVENTIL HFA/VENTOLIN HFA) 108 (90 Base) MCG/ACT inhaler Inhale 2 puffs into the lungs every 6 hours as needed for shortness of breath / dyspnea or wheezing prn Yes Unknown, Entered By History   fluconazole (DIFLUCAN) 100 MG tablet 1 tab q3days 3/15/2019 at am Yes Ayesha Ontiveros MD   fluticasone (FLONASE) 50 MCG/ACT spray Spray 1-2 sprays into both nostrils daily 3/16/2019 at am Yes Ralph Maldonado MD   ipratropium - albuterol 0.5 mg/2.5 mg/3 mL (DUONEB) 0.5-2.5 (3) MG/3ML neb solution Take 1 vial (3 mLs) by nebulization every 6 hours as needed for shortness of breath / dyspnea or wheezing prn Yes Stehp Espinoza PA-C   LORazepam (ATIVAN) 0.5 MG tablet Take 1 tablet (0.5 mg) by mouth At Bedtime 3/15/2019 at hs Yes Ralph Maldonado MD   losartan (COZAAR) 100 MG tablet Take 100 mg by mouth 2 times daily 3/16/2019 at am Yes Unknown, Entered By History   simvastatin (ZOCOR) 20 MG  tablet TAKE ONE TABLET (20 mg) BY MOUTH DAILY AT BEDTIME Past Week at Unknown time Yes Ralph Maldonado MD   order for DME Equipment being ordered: Oxygen   2L Oxygen via nasal cannula with activity   Expected duration: lifetime  Pt needs portable oxygen for travel to work and doctors visit   See Office visit notes from [facility]2F 3/15/18   Noah Porter APRN CNP   order for DME Dog walking three times weekly   Ralph Maldonado MD

## 2019-03-16 NOTE — ED NOTES
"[facility]  ED Nurse Handoff Report    ED Chief complaint: Altered Mental Status (After having lunch with family had sudden onset of confusion and loss of speech for 3-4 minutes, symptoms resolved upon ED arrival)      ED Diagnosis:   Final diagnoses:   None       Code Status: not addressed     Allergies:   Allergies   Allergen Reactions     No Known Drug Allergies      Nystatin        Activity level - Baseline/Home:  Independent    Activity Level - Current:   Independent     Needed?: No      Isolation: No  Infection: Not Applicable  Bariatric?: No    Vital Signs: 166/89 HR59 RR16 O2 sat 95    Cardiac Rhythm: ,        Pain level: 0-10 Pain Scale: 0    Is this patient confused?: No   Does this patient have a guardian?  No         If yes, is there guardianship documents in the Epic \"Code/ACP\" activity?  No         Guardian Notified?  No  [place] - Suicide Severity Rating Scale Completed?  Yes  If yes, what color did the patient score?  White    Patient Report: Initial Complaint: 40 min PTA had a 2 min period of inability to get the right words out, was very aware of this, no weakness or other sx other than SOB, hx lung ca new dx, took his inhaler and sx resolved   Focused Assessment: back to baseline here, has SOB, quit smoking in Dec , dx lung CA   Tests Performed: labs, MRI   Abnormal Results: still in MRI   Treatments provided: asa     Family Comments: here, supportive     OBS brochure/video discussed/provided to patient/family: No              Name of person given brochure if not patient: na              Relationship to patient: na    ED Medications:   Medications   aspirin (ASA) chewable tablet 324 mg (324 mg Oral Given 3/16/19 1355)       Drips infusing?:  No    For the majority of the shift this patient was Green.   Interventions performed were na.    Severe Sepsis OR Septic Shock Diagnosis Present: No    To be done/followed up on inpatient unit:  none at this time     ED NURSE " PHONE NUMBER: [phone]

## 2019-03-16 NOTE — PLAN OF CARE
RECEIVING UNIT ED HANDOFF REVIEW    ED Nurse Handoff Report was reviewed by: Yadira Bautista on March 16, 2019 at 6:42 PM

## 2019-03-16 NOTE — ED PROVIDER NOTES
History   Chief Complaint:  Altered mental status    HPI   Primo Dorsey is a 78 year old male, with a history of hyperlipidemia, hypertension, and COPD, who presents with children to the ED for evaluation of speech difficulty, transient. The patient reports being unable to speak coherently with his children for two minutes at 1300 this evening. The son reports the patient looking as if he thought he was making sense, but was not putting sentences together that made sense. He did not notice any other deficits at this time. The patient states he felt weak and was unable to focus his vision during the event. After the incident, the son reports putting the patient in the chair for a few minutes and he was able to speak coherently afterwards. The patient says he had slight shortness of breath, but was consistent with his COPD symptoms. While in the ER, the children state he is back to his baseline. The patient denies any headache, nausea, vomiting, chest pain, numbness or weakness, ongoing vision changes, or any other acute symptoms.     Allergies:  Nystatin     Medications:    Aspirin  Diflucan  Flonase  Albuterol  Levalbuterol  Cozaar  Zocor    Past Medical History:    COPD  Hypertension  Degenerative joint disease of cervical spine  Anxiety    Past Surgical History:    Appendectomy  Bilateral cataract surgery  Herniorrhaphy umbilical  Tonsillectomy    Family History:    Cerebrovascular disease  Allergies  Lung cancer  Stomach cancer  Hypertension  Coronary artery disease    Social History:  Smoking status: Former-12/31/2018  Alcohol use: No  Marital Status:  Single [1]     Review of Systems   Eyes: Positive for visual disturbance.   Cardiovascular: Negative for chest pain.   Gastrointestinal: Negative for nausea and vomiting.   Neurological: Positive for speech difficulty and weakness. Negative for numbness.   All other systems reviewed and are negative.      Physical Exam     Patient Vitals for the past 24  hrs:   BP Temp Temp src Pulse Resp SpO2 Height Weight   03/16/19 1500 144/82 -- -- 72 -- 91 % -- --   03/16/19 1445 177/88 -- -- 73 -- 94 % -- --   03/16/19 1430 136/65 -- -- 63 -- 94 % -- --   03/16/19 1415 145/76 -- -- 65 -- 95 % -- --   03/16/19 1356 -- -- -- -- -- -- 1.829 m (6') 58.1 kg (128 lb)   03/16/19 1332 180/88 98.6  F (37  C) Temporal 68 22 96 % 1.829 m (6') --     Physical Exam  VS: Reviewed per above  HENT: Mucous membranes moist  EYES: sclera anicteric  CV: Rate as noted, regular rhythm.   RESP: Effort normal. Breath sounds are normal bilaterally.  GI: no tenderness, not distended.  NEURO: GCS 15, cranial nerves II through XII are intact aside from baseline hearing deficit, 5 out of 5 strength in all 4 extremities, sensation is intact to light touch in all 4 extremities, normal finger-nose-finger testing bilaterally.  MSK: No deformity of the extremities  SKIN: Warm and dry    Emergency Department Course   ECG (14:00:57):  Rate 66 bpm. PA interval 164. QRS duration 94. QT/QTc 394/413. P-R-T axes 67 91 86. Sinus rhythm with premature atrial complexes. Rightward axis. Borderline ECG. No significant change compared to EKG on 1/9/19. Interpreted at 1451 by Dillon Higgins MD.    Imaging:  Radiographic findings were communicated with the patient who voiced understanding of the findings.  MR Brain without & with contrast:  1. Few tiny punctate areas of subtle restricted diffusion in both  hemispheres suggesting some tiny acute ischemic infarcts.  2. 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  3. Cerebral atrophy with chronic white matter changes.      Imaging independently reviewed and agree with radiologist interpretation.     MR Neck w/o and w/ Contrast Angiogram  Negative MR angiography of the neck.      Imaging independently reviewed and agree with radiologist interpretation.     MR Head w/o Contrast Angiogram  Mild to moderate narrowing of both distal M1 segments and  moderate narrowing of  a left M2 branch proximally. This could be due  to some atherosclerotic disease or possibly some vasospasm.     Imaging independently reviewed and agree with radiologist interpretation.     Laboratory:  CBC: WBC 14.4 (H), o/w WNL (HGB 13.6, )    BMP: Glucose 105 (H), o/w WNL (Creatinine 0.68)    Troponin (Collected 1416): <0.015    Interventions:  1355: Aspirin 324 mg PO  1708: Gadavist 10 mL IV    Emergency Department Course:  Past medical records, nursing notes, and vitals reviewed.  1438: I performed an exam of the patient and obtained history, as documented above.  IV inserted and blood drawn.  The patient was sent for a head MRI while in the emergency department, findings above.    1738: I spoke to Dr. Peters on-call for neurology.    1756: I rechecked the patient. Explained findings to patient.    Findings and plan explained to the Patient who consents to admission.     1813: Discussed the patient with Dr. Carter, who will admit the patient to a neuro bed for further monitoring, evaluation, and treatment.     Impression & Plan      CMS Diagnoses: The patient has stroke symptoms:           ED Stroke specific documentation           NIHSS PDF          Protocol PDF     Patient last known well time: 1300  ED Provider first to bedside at: 1438  MRI Results received at: 1732  Patient was not treated with TPA due to the following reason(s):  Rapidly improving symptoms    National Institutes of Health Stroke Scale (Baseline)  Time Performed: 1440      Score    Level of consciousness: (0)   Alert, keenly responsive    LOC questions: (0)   Answers both questions correctly    LOC commands: (0)   Performs both tasks correctly    Best gaze: (0)   Normal    Visual: (0)   No visual loss    Facial palsy: (0)   Normal symmetrical movements    Motor arm (left): (0)   No drift    Motor arm (right): (0)   No drift    Motor leg (left): (0)   No drift    Motor leg (right): (0)   No drift    Limb ataxia: (0)   Absent     Sensory: (0)   Normal- no sensory loss    Best language: (0)   Normal- no aphasia    Dysarthria: (0)   Normal    Extinction and inattention: (0)   No abnormality        Total Score:  0        Stroke Mimics were considered (including migraine headache, seizure disorder, hypoglycemia (or hyperglycemia), head or spinal trauma, CNS infection, Toxin ingestion and shock state (e.g. sepsis).    Medical Decision Making:  Patient presents the ER for evaluation after transient aphasia.  Initial vital signs notable for blood pressure 180/88.  Exam does not reveal focal neurological deficit.  Due to concerning aphasia for approximately 2 minutes, I was concerned for TIA.  MRI/MRA imaging of the head and neck was obtained.  These revealed small bilateral acute ischemic infarcts as well as narrowing of the left M1 and M2 segment.  Based on lack of deficits in the ER, I suspect that these small acute infarcts were not contributing to his presenting symptoms.  Findings were discussed with stroke neurology and admission was recommended.  Patient had already received aspirin in the ER. There is no electrolyte derangement or acute kidney injury or anemia noted on blood work.  Furthermore EKG did not reveal specific signs of ischemia and single troponin was negative.  In the absence of chest pain I have a lower suspicion for ACS.  Patient remained stable and without new neurological deficits while in the ER awaiting inpatient bed.    Diagnosis:    ICD-10-CM   1. Cerebrovascular accident (CVA), unspecified mechanism (H) I63.9   2. Aphasia R47.01     Disposition:  Admitted to neuro bed.    Jeffrey House  3/16/2019    EMERGENCY DEPARTMENT  Scribe Disclosure:  Jeffrey WINTER, am serving as a scribe at 2:38 PM on 3/16/2019 to document services personally performed by Dillon Higgins MD based on my observations and the provider's statements to me.         Dillon Higgins MD  03/16/19 0854

## 2019-03-16 NOTE — H&P
[facility]    History and Physical - Hospitalist Service       Date of Admission:  3/16/2019    Assessment & Plan   Primo Dorsey is a 78 year old male with h/o COPD, smoker (quit 12/31/2018), HTN who was seen in the ED on 1/9/2019 with CT findings c/w advanced lung cancer which has progressed on repeat CT on 3/9 (summaries in HPI). He was also found to have an incidental subsegmental PE on 1/9 and took lovenox for 10 days without further anticoagulation. This had resolved on f/u CT on 3/9. He has been holding ASA for anticipated biopsy and developed transient difficulty with his speech. He also reports recurring left arm numbness lasting seconds which started a couple months ago, as well as occasional difficulty focusing either eye. By time of presentation, all neurologic symptoms have resolved.     MRI shows few tiny punctate areas of subtle restricted diffusion in both hemispheres suggesting tiny ischemic infarcts. MRA shows narrowing of both distal M1 segments and moderate narrowing left M2 branch proximally.      Transient Aphasia secondary to CVI  Bilateral tiny punctate CVI's, concerning for emboli   Known advanced lung cancer, new diagnosis 1/2019 (has not had biopsy or seen oncology)   Recent subsegmental PE (1/2/2019), no longer on anticoagulation, RESOLVED on f/u imaging   HTN, HLD  - Admit to observation for stroke workup and oncology consult (planning to see [facility] as outpatient)   - Given his cancer with bilateral strokes and recent PE, depending on what we find on workup below, I would be inclined to start him on anticoagulation, likely lovenox due to his superior efficacy in context of cancer. Appreciate neurology and oncology's input in this regard.   - ASA given in ED and will hold further doses until awaiting consult. Patient will need biopsy and may be going on anticoagulation.   - Neuro floor with tele, Neuro q 4 hours, PT/OT/Speech  - Given all deficits have resolved, advance  diet if passes bedside swallow  - IVF hydration with NS + 20 KCl @ 75 ml/h  - Permissive HTN with PRN IV meds ordered, holding PTA losartan  - Continue PTA simvastatin (lipids, A1c ordered)   - TTE with bubble    COPD, stable - no evidence of acute exacerbation  - Patient may continue home bronchodilators: Advair,  PRN albuterol INH, PRN duonebs.     Probable insomnia - continued PTA ativan q HS as PRN dose rather than scheduled. Not an ideal med for this patient.      Diet: Advance to regular diet if passes swallow.   DVT Prophylaxis: Pneumatic Compression Devices  Mahmood Catheter: not present  Code Status: DNR/DNI, discussed with patient and he was clear that he would not want resuscitation attempts if he suffered cardiac or pulmonary arrest     Goals of Care - Regarding his cancer, he does want to pursue treatment with chemotherapy and /or radiation if oncology feels it will be to his benefit.     Disposition Plan   Expected discharge: Tomorrow, recommended to prior living arrangement once stroke evaluation complete and we have a plan for biopsy and determined whether to discharge on anticoagulation or note. .  Entered: Shereen Carter MD 03/16/2019, 6:23 PM     The patient's care was discussed with the Patient.    Shereen Carter MD  [facility]    ______________________________________________________________________    Chief Complaint   Trouble speaking.     History is obtained from the patient and ED notes    History of Present Illness   Primo Dorsey is a 78 year old male with h/o COPD, smoker (quit 12/31/2018), HTN who was seen in the ED on 1/9/2019 with abdominal pain and SOB and found to have a left lower lobe lung mas, patchy left upper lobe consolidation with lymphadenopathy and an adrenal mass concerning for malignancy as well as a tiny acute subsegmental PE in the R upper lobe. He was given Lovenox for this x 10 days and did not follow up with his PCP later in Feb. He has not been  any blood thinners since, just aspirin, which was recently held for planned biopsy. A repeat chest CT on 3/8 showed no change in lung mass and moderate patchy consolidation, and increase in adrenal mass and lymphadenopathy.     He presents tonight due an episode of difficulty with his speech. Around 1300, he was with his children and when he tried to get his words out, they were not coming out right. The son reported the patient looking as if he thought he was making sense, but was not putting sentences together that made sense. After the incident, the son reports putting the patient in the chair for a few minutes and he was able to speak coherently afterwards. He did not notice any other deficits at this time. He does point out that he has noted numbness in his left arm which comes and goes, lasting only seconds, which started about 2 months ago. He denies any weakness, trouble swallowing or prior episodes of speech difficulty. He denies any visual fields cuts but does note that one of his eyes will be blurry from time to time, it seems to affect both eyes at different times.     The patient has chronic shortness of breath from SOB, no change in this. He is not able to walk his dog due to limitations from his breathing but can get around the house independently. He has a chronic cough and has noted blood in his cough for the last few months. This has progressed. He denies any fevers, chills, weight loss, chest pain. While in the ER, the children state he is back to his baseline. The patient denies any headache, nausea, vomiting, ongoing vision changes, or any other acute symptoms.     CT 1/9/2019:   IMPRESSION:   1. A 3 cm pulmonary nodular opacity within the left lower lobe in  addition to a moderate-sized region of confluent and patchy  consolidation within the left upper lobe. These findings are  suspicious for neoplasm, but an infectious or inflammatory process  could also have this appearance.  2. A few mildly  enlarged mediastinal and left hilar lymph nodes.  3. Tiny acute embolus in a subsegmental pulmonary artery in the right  upper lobe.  4. Moderate to marked wall thickening of the cecum and proximal and  mid ascending colon. This is nonspecific, but is most likely  infectious or inflammatory in etiology.  5. 3.5 cm right adrenal nodule. This is suspicious for a metastasis,  but could also represent an adenoma.  6. A PET scan could be considered for further evaluation of the  thoracic and right adrenal findings.    CT 3/8/2019  IMPRESSION:  1. No change in 3 cm mass in the superior segment of the left lower  lobe. No change in moderate-sized area of confluent and patchy  consolidation of the left upper lobe, concerning for malignancy.  2. Significantly increased size and number of mediastinal,  prevascular, axillary and supraclavicular lymph nodes, concerning for  malignancy.  3. Resolution of subsegmental right acute pulmonary embolus. No acute  pulmonary embolus noted on today's exam.  4. Increased size of right adrenal mass, concerning for worsening  metastatic disease.       Review of Systems    The 10 point Review of Systems is negative other than noted in the HPI    Past Medical History    I have reviewed this patient's medical history and updated it with pertinent information if needed.   COPD. Not on oxygen.   Tobacco dependence. Quit 12/31/2108  HLD  Allergic rhinitis.   Suspect he must have insomnia (on ativan at night) .     Past Surgical History   I have reviewed this patient's surgical history and updated it with pertinent information if needed.  Past Surgical History:   Procedure Laterality Date     APPENDECTOMY       EYE SURGERY      bilateral cataract     HERNIORRHAPHY UMBILICAL N/A 5/12/2016    Procedure: HERNIORRHAPHY UMBILICAL;  Surgeon: Ariel Kaye MD;  Location: [facility]     LAPAROSCOPIC HERNIORRHAPHY INGUINAL BILATERAL Bilateral 5/12/2016    Procedure: LAPAROSCOPIC HERNIORRHAPHY INGUINAL  BILATERAL;  Surgeon: Ariel Kaye MD;  Location: [facility]     TONSILLECTOMY         Social History   I have reviewed this patient's social history and updated it with pertinent information if needed.  Social History     Tobacco Use     Smoking status: Former Smoker     Packs/day: 0.75     Years: 45.00     Pack years: 33.75     Types: Cigarettes     Last attempt to quit: 2018     Years since quittin.2     Smokeless tobacco: Never Used   Substance Use Topics     Alcohol use: No     Alcohol/week: 0.0 oz     Comment: not for 10 years     Drug use: No   Lives independently. Cooks and cleans for himself. Does not use an assist device. He lost his significant other to COPD. She smoked as well.     Family History   I have reviewed this patient's family history and updated it with pertinent information if needed.   Family History   Problem Relation Age of Onset     Cerebrovascular Disease Father         age 60     Allergies Daughter      Cancer Brother         lung and stomach     Hypertension Sister      Hypertension Sister      Hypertension Brother      C.A.D. Brother         cabg     Diabetes No family hx of      Cancer - colorectal No family hx of      Prostate Cancer No family hx of        Prior to Admission Medications   Prior to Admission Medications   Prescriptions Last Dose Informant Patient Reported? Taking?   ADVAIR DISKUS 250-50 MCG/DOSE inhaler 3/16/2019 at am Self No Yes   Sig: INHALE ONE PUFF BY MOUTH TWICE DAILY    LORazepam (ATIVAN) 0.5 MG tablet 3/15/2019 at hs Self No Yes   Sig: Take 1 tablet (0.5 mg) by mouth At Bedtime   albuterol (PROAIR HFA/PROVENTIL HFA/VENTOLIN HFA) 108 (90 Base) MCG/ACT inhaler prn Self Yes Yes   Sig: Inhale 2 puffs into the lungs every 6 hours as needed for shortness of breath / dyspnea or wheezing   fluconazole (DIFLUCAN) 100 MG tablet 3/15/2019 at am Self No Yes   Si tab q3days   fluticasone (FLONASE) 50 MCG/ACT spray 3/16/2019 at am Self Yes Yes   Sig: Spray  1-2 sprays into both nostrils daily   ipratropium - albuterol 0.5 mg/2.5 mg/3 mL (DUONEB) 0.5-2.5 (3) MG/3ML neb solution prn Self No Yes   Sig: Take 1 vial (3 mLs) by nebulization every 6 hours as needed for shortness of breath / dyspnea or wheezing   losartan (COZAAR) 100 MG tablet 3/16/2019 at am Self Yes Yes   Sig: Take 100 mg by mouth 2 times daily   order for DME   No No   Sig: Dog walking three times weekly   order for DME   No No   Sig: Equipment being ordered: Oxygen   2L Oxygen via nasal cannula with activity   Expected duration: lifetime  Pt needs portable oxygen for travel to work and doctors visit   See Office visit notes from [facility] 3/15/18   simvastatin (ZOCOR) 20 MG tablet Past Week at Unknown time Self No Yes   Sig: TAKE ONE TABLET (20 mg) BY MOUTH DAILY AT BEDTIME      Facility-Administered Medications Last Administration Doses Remaining   albuterol (PROAIR HFA/PROVENTIL HFA/VENTOLIN HFA) 108 (90 Base) MCG/ACT inhaler 2 puff None recorded 1        Allergies   Allergies   Allergen Reactions     No Known Drug Allergies      Nystatin        Physical Exam   Vital Signs: Temp: 98.6  F (37  C) Temp src: Temporal BP: 166/89 Pulse: 59   Resp: 22 SpO2: 95 % O2 Device: None (Room air)    Weight: 128 lbs 0 oz    Constitutional:   awake, alert, cooperative, no apparent distress, and appears stated age     Eyes:   Lids and lashes normal, pupils equal, round and reactive to light, extra ocular muscles intact, sclera clear, conjunctiva normal     ENT:   Normocephalic, without obvious abnormality, atramatic, oral pharynx with moist mucus membranes, tonsils without erythema or exudates .     Neck:   Supple, symmetrical, trachea midline, no adenopathy, thyroid symmetric, not enlarged and no tenderness, skin normal     Lungs:   Mild increase work of breathing, decreased air exchange, otherwise clear to auscultation bilaterally with rare wheeze, no crackles or wheezing     Cardiovascular:   Regular rate and rhythm,  normal S1 and S2, no S3 or S4, and no murmur noted. Extremities are warm. No edema.      Abdomen:   Normal bowel sounds, soft, non-distended, non-tender, no masses palpated, no hepatosplenomegally     Musculoskeletal:   There is no redness, warmth, or swelling of the joints. Thin build.      Neurologic:   Awake, alert, oriented to name, place and time.    Speech normal.   Face symmetric  Horizontal gaze intact  Motor strength 5/5 in all 4 extremities.   Sensation intact throughout all 4 extremities.     Neuropsychiatric:   General: normal, calm and normal eye contact     Skin:   no bruising or bleeding, no redness, warmth, or swelling and no rashes           Data   Data reviewed today: I reviewed all medications, new labs and imaging results over the last 24 hours. I personally reviewed the EKG tracing showing NSR with RAD.    Recent Labs   Lab 03/16/19  1350   WBC 14.4*   HGB 13.6   MCV 87         POTASSIUM 4.1   CHLORIDE 99   CO2 28   BUN 20   CR 0.68   ANIONGAP 7   ANGEL 9.0   *   ALBUMIN 3.7   PROTTOTAL 7.5   BILITOTAL 0.5   ALKPHOS 83   ALT 23   AST 26   TROPI <0.015     Recent Results (from the past 24 hour(s))   MR Head w/o Contrast Angiogram    Narrative    MRA BRAIN ([place] [facility]) WITHOUT CONTRAST 3/16/2019 4:38 PM     HISTORY: Transient aphasia.    TECHNIQUE: MR angiography was performed through the [facility] [facility]  without contrast.    FINDINGS: There is some smooth narrowing of the distal right M1  segment in the 50th percentile range and slight narrowing of the  distal left M1 segment in the 30th percentile range and some narrowing  of a left M2 branch in the 60th percentile range. The basilar artery  and distal internal carotid arteries are patent. The anterior and  posterior cerebral arteries are patent. No evidence for aneurysm.      Impression    IMPRESSION: Mild to moderate narrowing of both distal M1 segments and  moderate narrowing of a left M2 branch proximally. This could  be due  to some atherosclerotic disease or possibly some vasospasm.     CAPRICE SHEETS MD   MR Brain w/o & w Contrast    Addendum: 3/16/2019    WYATT POE  Accession # [id number]    The original report on this patient was dictated by myself.      ADDENDUM: Exam was performed without and with contrast. 10 mL Gadavist  given.    CAPRICE SHEETS MD (Date of Addendum: 3/16/2019 5:32 PM)    CAPRICE SHEETS MD      Narrative    MRI BRAIN WITHOUT AND WITH CONTRAST  3/16/2019 5:09 PM    HISTORY:  TIA, initial exam; 3 minutes of word salad.     TECHNIQUE:  Multiplanar, multisequence MRI of the brain without and  with    COMPARISON: None.    FINDINGS: Diffusion-weighted images show some minimal tiny areas of  restricted diffusion seen in both hemispheres as well as a larger area  of T2 shine through in the right corona radiata. The latter is  consistent with old ischemia. The former are consistent with new tiny  ischemic infarcts. Cerebral atrophy and moderately extensive white  matter changes are noted along with an old left parietal infarct.  Vascular structures are patent at the skull base. Exam is somewhat  limited due to motion artifact. Postcontrast images do not show any  abnormal areas of enhancement or any focal mass lesions. Vascular  structures are patent at the skull base.      Impression    IMPRESSION:   1. Few tiny punctate areas of subtle restricted diffusion in both  hemispheres suggesting some tiny acute ischemic infarcts.  2. 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  3. Cerebral atrophy with chronic white matter changes.      CAPRICE SHEETS MD   MR Neck w/o & w Contrast Angiogram    Narrative    MRA NECK (CAROTIDS) WITHOUT AND WITH CONTRAST 3/16/2019 5:19 PM     HISTORY: Transient aphasia.    TECHNIQUE: MR angiography was performed through the neck without and  with contrast. 10 mL Gadavist given. Carotid stenoses were evaluated  by comparing the caliber of the proximal internal carotid artery to  the  caliber of the distal internal carotid arteries. Radiation dose  for this scan was reduced using automated exposure control, adjustment  of the mA and/or kV according to patient size, or iterative  reconstruction technique.    FINDINGS:  Brachiocephalic vessels are patent off the arch. Both  carotid and vertebral systems are widely patent. No evidence for  stenosis or dissection.      Impression    IMPRESSION: Negative MR angiography of the neck.      CAPRICE SHEETS MD

## 2019-03-17 NOTE — PROGRESS NOTES
[facility]    Hospitalist Progress Note      Assessment & Plan   Primo Dorsey is a 78 year old male with PMH of COPD, HTN, HLP and recently diagnosed tiny acute subsegmental PE and lung mass- concerning for malignancy- admitted for evaluation of speech difficulties.     1. 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  - pt has suspected lung cancer- so hypercoagulable status related to malignancy likely contributing to his neurologic event  - ASA given in ER  - Tele- NSR with some PAC  - Neuro consult appreciated  - likely will need anticoagulation but hold it for now for anticipated CT-guided biopsy tomorrow; after the biopsy- Oncology rec to start Lovenox  - Neurochecks  - Hb A1c 6.3, LDL cholesterol 83, total choles 212  - continue PTA Zocor; plan to resume PTA Losartan 100 mg po daily  - Echo with bubble study  - speech improved, pt feels it is still slow; no numbness, no weakness  - PT/OT- home with outpt therapies?  - SLP- regular diet    2. Suspected advanced lung cancer  - initially diagnosed on CT chest in 1/2019 showed a 3 cm pulmonary nodular opacity within the left lower lobe in addition to a moderate-sized region of confluent and patchy consolidation within the left upper lobe; few mildly enlarged mediastinal and left hilar lymph nodes; also a 3.5 cm right adrenal nodule. This is suspicious for a metastasis- but could also represent an adenoma.  - repeat CT in 3/2019- there was no change in 3 cm mass in the superior segment of the left lower and no change  "in moderate-sized area of confluent and patchy consolidation of the left upper lobe, concerning for malignancy but significantly increased size and number of mediastinal, prevascular, axillary and supraclavicular lymph nodes and iIncreased size of right adrenal mass, concerning for worsening metastatic disease.  - Oncology consult appreciated  - plan for CT-guided biopsy  - follow up with Oncology     3. Recent tiny acute embolus in a subsegmental pulmonary artery in the right  upper lobe  - diagnosed in 1/2019; was on Lovenox for 10 days  - plan to resume Lovenox after biopsy    4. COPD  - long h/o smoking but quit smoking in [place] 2018  - was recently prescribed home O2 by PMD  - reports some chronic SOB  - no wheezing  - PTA Advair switched to Breo Ellipta  - Alb inh and duoneb prn    5. HLP  - resume PTA Zocor    6. Probable insomnia - continued PTA ativan q HS as PRN dose rather than scheduled. Not an ideal med for this patient.     DVT Prophylaxis: Pneumatic Compression Devices  Code Status: DNR/DNI  Expected discharge: Tomorrow, recommended to prior living arrangement once tissue biopsy done    Alexa Casanova MD    Interval History   Doing fine, speech improved but he thinks \"it is still slow, not back to baseline yet'; reports some back pain; denies numbness/weakness; no chest pain; reports some chronic SOB; no N/V, no abd pain; discussed with bedside RN    -Data reviewed today: I reviewed all new labs and imaging results over the last 24 hours. I personally reviewed the brain MRI image(s) showing as above.    Physical Exam   Temp: 97.9  F (36.6  C) Temp src: Oral BP: 160/79 Pulse: 72   Resp: 16 SpO2: 95 % O2 Device: Nasal cannula Oxygen Delivery: 1 LPM  Vitals:    03/16/19 1356 03/17/19 0023   Weight: 58.1 kg (128 lb) 57.9 kg (127 lb 10.3 oz)     Vital Signs with Ranges  Temp:  [97.9  F (36.6  C)-98.6  F (37  C)] 97.9  F (36.6  C)  Pulse:  [59-73] 72  Resp:  [16-22] 16  BP: (124-180)/(65-99) " 160/79  SpO2:  [91 %-98 %] 95 %  I/O last 3 completed shifts:  In: 100 [P.O.:100]  Out: -     Constitutional: Awake, alert, NAD, hard of hearing  Respiratory: Diminished air entry, no wheezing, no rales, no crackles  Cardiovascular: S1S2, RRR, no murmurs no rubs  GI: abdomen- soft, nonT, nonD, BS present  Skin/Integumen: intact, no rashes, no cyanosis  Extremities- no leg swelling      Medications     0.9% sodium chloride + KCl 20 mEq/L 75 mL/hr at 03/17/19 0025     - MEDICATION INSTRUCTIONS -         fluticasone-vilanterol  1 puff Inhalation Daily     simvastatin  20 mg Oral At Bedtime       Data   Recent Labs   Lab 03/17/19  0435 03/16/19  1350   WBC  --  14.4*   HGB  --  13.6   MCV  --  87   PLT  --  226   NA  --  134   POTASSIUM  --  4.1   CHLORIDE  --  99   CO2  --  28   BUN  --  20   CR  --  0.68   ANIONGAP  --  7   ANGEL  --  9.0   GLC  --  105*   ALBUMIN  --  3.7   PROTTOTAL  --  7.5   BILITOTAL  --  0.5   ALKPHOS  --  83   ALT  --  23   AST  --  26   TROPI <0.015 <0.015       Recent Results (from the past 24 hour(s))   MR Head w/o Contrast Angiogram    Narrative    MRA BRAIN ([place] [facility]) WITHOUT CONTRAST 3/16/2019 4:38 PM     HISTORY: Transient aphasia.    TECHNIQUE: MR angiography was performed through the [place] [facility]  without contrast.    FINDINGS: There is some smooth narrowing of the distal right M1  segment in the 50th percentile range and slight narrowing of the  distal left M1 segment in the 30th percentile range and some narrowing  of a left M2 branch in the 60th percentile range. The basilar artery  and distal internal carotid arteries are patent. The anterior and  posterior cerebral arteries are patent. No evidence for aneurysm.      Impression    IMPRESSION: Mild to moderate narrowing of both distal M1 segments and  moderate narrowing of a left M2 branch proximally. This could be due  to some atherosclerotic disease or possibly some vasospasm.     CAPRICE SHEETS MD   MR Brain w/o & w  Contrast    Addendum: 3/16/2019    WYATT POE  Accession # [id number]    The original report on this patient was dictated by myself.      ADDENDUM: Exam was performed without and with contrast. 10 mL Gadavist  given.    CAPRICE SHEETS MD (Date of Addendum: 3/16/2019 5:32 PM)    CAPRICE SHEETS MD      Narrative    MRI BRAIN WITHOUT AND WITH CONTRAST  3/16/2019 5:09 PM    HISTORY:  TIA, initial exam; 3 minutes of word salad.     TECHNIQUE:  Multiplanar, multisequence MRI of the brain without and  with    COMPARISON: None.    FINDINGS: Diffusion-weighted images show some minimal tiny areas of  restricted diffusion seen in both hemispheres as well as a larger area  of T2 shine through in the right corona radiata. The latter is  consistent with old ischemia. The former are consistent with new tiny  ischemic infarcts. Cerebral atrophy and moderately extensive white  matter changes are noted along with an old left parietal infarct.  Vascular structures are patent at the skull base. Exam is somewhat  limited due to motion artifact. Postcontrast images do not show any  abnormal areas of enhancement or any focal mass lesions. Vascular  structures are patent at the skull base.      Impression    IMPRESSION:   1. Few tiny punctate areas of subtle restricted diffusion in both  hemispheres suggesting some tiny acute ischemic infarcts.  2. 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  3. Cerebral atrophy with chronic white matter changes.      CAPRICE SHEETS MD   MR Neck w/o & w Contrast Angiogram    Narrative    MRA NECK (CAROTIDS) WITHOUT AND WITH CONTRAST 3/16/2019 5:19 PM     HISTORY: Transient aphasia.    TECHNIQUE: MR angiography was performed through the neck without and  with contrast. 10 mL Gadavist given. Carotid stenoses were evaluated  by comparing the caliber of the proximal internal carotid artery to  the caliber of the distal internal carotid arteries. Radiation dose  for this scan was reduced using automated  exposure control, adjustment  of the mA and/or kV according to patient size, or iterative  reconstruction technique.    FINDINGS:  Brachiocephalic vessels are patent off the arch. Both  carotid and vertebral systems are widely patent. No evidence for  stenosis or dissection.      Impression    IMPRESSION: Negative MR angiography of the neck.      CAPRICE SHEETS MD

## 2019-03-17 NOTE — PLAN OF CARE
OT: Eval complete and Tx initiated. Pt admitted under observation for altered mental status due to possible CVA. Recent diagnosis of lung cancer. Prior to admit, pt lives alone in house and reports I with ADL/IADLs, including med mgmt, driving, and full-time work.    Discharge Planner OT   Patient plan for discharge: Home today    Current status: Pt required SBA for functional transfers and standing ADLs. Increased SOB with activity; however, O2 sats remain in low 90s on room. Discontinued supplemental O2 at end of session. Pt completed cognitive screen which indicated a mild impairment with STM and attention deficits.     Barriers to return to prior living situation: Stairs to enter and within home; Cognitive deficit with lack of insight and safety concerns; Bathtub    Recommendations for discharge: Home with A from family for IADLs, ernie transportation, and follow up with OP OT    Rationale for recommendations: Pt limited by impaired cognition, safety, and activity tolerance. Pt would benefit from follow up with OP OT to address cognitive deficit and safety with IADL participation. Until further recommendations by OP OT, strongly encouraged increased A from family for IADLs, such as transportation and med mgmt. If this level of A is not available at discharge, pt would benefit from home RN and home OT.       Entered by: Mo Aceves 03/17/2019 8:59 AM

## 2019-03-17 NOTE — PLAN OF CARE
OT/CR: Order received re: smoking cessation consult. Pt states he quit smoking 3 months ago and has not concerns. Pt has no desire to return to smoking. Discontinuation of smoking cessation order.

## 2019-03-17 NOTE — PLAN OF CARE
Discharge Planner PT   Patient plan for discharge: Refer to OT  Current status: Pt admitted on OBS. Orders received, chart reviewed, discussed with OT. Per OT, no IP PT needs. Pt is mobilizing safely and has more cognition concerns which OT will follow and address. Will complete order.  Barriers to return to prior living situation: Refer to OT  Recommendations for discharge: Refer to OT  Rationale for recommendations: Refer to OT       Entered by: Hiren Smith 03/17/2019 8:31 AM

## 2019-03-17 NOTE — PLAN OF CARE
A&O x 4. Forgetful. Slurred speech, baseline per family. Anxious sometimes. [place]. High SBP but still within given parameter. Tele SR. Regular diet. Up with SBA. Denies pain. Plan neurology consult. Echo bubble tomorrow.

## 2019-03-17 NOTE — PLAN OF CARE
A&O x4, some forgetfulness. Baseline mild slurred speech, denies numbness to L arm, vision changes and no aphasia noted. Tremors to BUE, otherwise neuros intact. VSS. Tele SD. 0400 Troponin level WNL. Regular diet. Up with SBA w/ GB. Reported mild pain to right side of upper back, relieved with PRN Tylenol, cold pack, and repositioning. Plan for TTE w/ bubble, neuro and oncology consult. Possible discharge later today after stroke eval and creating a plan for future biopsy. Continue to monitor.

## 2019-03-17 NOTE — PROGRESS NOTES
"BEDSIDE SWALLOW EVAL      03/17/19 1348   General Information   Onset Date 03/16/19   Start of Care Date 03/17/19   Referring Physician Dr. Raul PIMENTEL   Patient Profile Review/OT: Additional Occupational Profile Info See Profile for full history and prior level of function   Patient/Family Goals Statement Pt reports speech back to baseline with the exception of some word finding. Pt reports no difficulties with swallow function.    Swallowing Evaluation Bedside swallow evaluation   Behaviorial Observations WFL (within functional limits)   Mode of current nutrition Oral diet   Type of oral diet Regular;Thin liquid   Respiratory Status O2 Supply   Comments Per MD: \"Primo Dorsey is a 78 year old male with h/o COPD, smoker (quit 12/31/2018), HTN who was seen in the ED on 1/9/2019 with CT findings c/w advanced lung cancer which has progressed on repeat CT on 3/9 (summaries in HPI). He was also found to have an incidental subsegmental PE on 1/9 and took lovenox for 10 days without further anticoagulation. This had resolved on f/u CT on 3/9. He has been holding ASA for anticipated biopsy and developed transient difficulty with his speech. He also reports recurring left arm numbness lasting seconds which started a couple months ago, as well as occasional difficulty focusing either eye. By time of presentation, all neurologic symptoms have resolved. Primo Dorsey is a 78 year old male with h/o COPD, smoker (quit 12/31/2018), HTN who was seen in the ED on 1/9/2019 with CT findings c/w advanced lung cancer which has progressed on repeat CT on 3/9 (summaries in HPI). He was also found to have an incidental subsegmental PE on 1/9 and took lovenox for 10 days without further anticoagulation. This had resolved on f/u CT on 3/9. He has been holding ASA for anticipated biopsy and developed transient difficulty with his speech. He also reports recurring left arm numbness lasting seconds which started a couple months ago, as well " "as occasional difficulty focusing either eye. By time of presentation, all neurologic symptoms have resolved. MRI shows few tiny punctate areas of subtle restricted diffusion in both hemispheres suggesting tiny ischemic infarcts. MRA shows narrowing of both distal M1 segments and moderate narrowing left M2 branch proximally. \"   Clinical Swallow Evaluation   Oral Musculature generally intact   Structural Abnormalities none present   Dentition present and adequate   Mucosal Quality good   Mandibular Strength and Mobility intact   Oral Labial Strength and Mobility WFL   Lingual Strength and Mobility WFL   Velar Elevation intact   Buccal Strength and Mobility intact   Laryngeal Function Cough;Throat clear;Swallow;Voicing initiated;Dry swallow palpated   Additional Documentation Yes   Additional evaluation(s) completed today No   Clinical Swallow Eval: Thin Liquid Texture Trial   Mode of Presentation, Thin Liquids cup;straw;self-fed   Volume of Liquid or Food Presented 2 oz    Oral Phase of Swallow WFL   Pharyngeal Phase of Swallow intact   Diagnostic Statement No overt s/sx of aspiration.    Clinical Swallow Eval: Pudding Thick Liquid Texture Trial   Mode of Presentation, Pudding spoon;self-fed   Volume of Pudding Presented 4 tsp    Oral Phase, Pudding WFL   Pharyngeal Phase, Pudding intact   Diagnostic Statement No overt s/sx of aspiration    Clinical Swallow Eval: Solid Food Texture Trial   Mode of Presentation, Solid self-fed   Volume of Solid Food Presented x12 bites of stir reece with chicken    Oral Phase, Solid WFL   Pharyngeal Phase, Solid intact   Diagnostic Statement No overt s/sx of aspiration    VFSS Evaluation   VFSS Additional Documentation No   FEES Evaluation   Additional Documentation No   Swallow Compensations   Swallow Compensations No compensations were used   Swallow Eval: Clinical Impressions   Skilled Criteria for Therapy Intervention No problems identified which require skilled intervention "   Functional Assessment Scale (FAS) 7   Diet texture recommendations Regular diet;Thin liquids   Recommended Feeding/Eating Techniques alternate between small bites and sips of food/liquid;maintain upright posture during/after eating for 30 mins;small sips/bites   Demonstrates Need for Referral to Another Service occupational therapy;physical therapy   Clinical Impression Comments Bedside swallow evaluation completed this date per conclusive MRI findings indicating tiny punctate areas of subtle restricted diffusion in both hemispheres suggesting tiny ischemic infarcts. Pt aware of current medial status, and able to follow along with conversation. SLP assessed pt ability to complete generative naming tasks, identify functional use of given objects, as well as generate complex sentences. Pt reported word finding difficulties, however could not describe an example, nor were any instances observed during 30 min evaluation. Per chart review and discussion with RN, pt family reported pt speech/language back to baseline. Per discussion with pt, pt verbalized no immediate concerns with swallow function, as well as no prior history of a modified diet. SLP assessed po trials of thin liquids, pureed and regular solids. No overt s/sx of aspiration noted with demonstration of timely swallow initiation and response. Recommend continuation of regular solids with thin liquids with use of safe swallow precautions including sitting upright, small bites/sips, alternate liquids/solids. Pt does not meet criteria for skilled ST intervention. ST will complete orders at this time. Please re-order as indicated.    Total Evaluation Time   Total Evaluation Time (Minutes) 30

## 2019-03-17 NOTE — PROGRESS NOTES
03/17/19 0815   Quick Adds   Type of Visit Initial Occupational Therapy Evaluation   Living Environment   Lives With alone   Living Arrangements house   Home Accessibility stairs to enter home;stairs within home   Number of Stairs, Main Entrance 2   Number of Stairs, Within Home, Primary other (see comments)  (13)   Transportation Anticipated car, drives self;family or friend will provide   Self-Care   Usual Activity Tolerance excellent   Current Activity Tolerance good   Equipment Currently Used at Home none   Functional Level   Ambulation 0-->independent   Transferring 0-->independent   Toileting 0-->independent   Bathing 0-->independent   Dressing 0-->independent   Eating 0-->independent   Communication 0-->understands/communicates without difficulty   Swallowing 0-->swallows foods/liquids without difficulty   Cognition 0 - no cognition issues reported   Fall history within last six months no   General Information   Onset of Illness/Injury or Date of Surgery - Date 03/16/19   Referring Physician Dr Shereen Carter   Patient/Family Goals Statement Pt plans to discharge home today   Additional Occupational Profile Info/Pertinent History of Current Problem Admitted under observation fof possible CVA. Imaging indicated old L parietal and R corona radiata infarcts. Recent diagnosis of lung cancer   Precautions/Limitations fall precautions;oxygen therapy device and L/min  (On 2L at eval. No supplemental O2 at baseline. )   Cognitive Status Examination   Orientation orientation to person, place and time   Level of Consciousness alert;confused   Follows Commands (Cognition) WNL   Memory impaired   Visual Perception   Visual Perception Wears glasses   Visual Perception Comments Denies blurred/doubled vision   Sensory Examination   Sensory Quick Adds No deficits were identified   Sensory Comments Denies B UE numbness/tingling   Pain Assessment   Patient Currently in Pain No   Range of Motion (ROM)   ROM Quick Adds No  deficits were identified   ROM Comment B UE WNL   Strength   Manual Muscle Testing Quick Adds Other   Strength Comments R UE 4/5 and L UE 5/5 on MMT. Pt denies recent UE weakenss   Hand Strength   Hand Strength Comments R hand dominant. Mild L grasp weakness noted   Coordination   Upper Extremity Coordination No deficits were identified   Mobility   Bed Mobility Comments I with bed mobility   Transfer Skill: Bed to Chair/Chair to Bed   Level of [place]: Bed to Chair stand-by assist   Transfer Skill: Sit to Stand   Level of [place]: Sit/Stand stand-by assist   Transfer Skill: Toilet Transfer   Level of [place]: Toilet stand-by assist   Balance   Balance Comments No LOB during OT eval.    Upper Body Dressing   Level of [place]: Dress Upper Body independent   Lower Body Dressing   Level of [place]: Dress Lower Body independent   Toileting   Level of [place]: Toilet independent   Grooming   Level of [place]: Grooming independent   Eating/Self Feeding   Level of [place]: Eating independent   Instrumental Activities of Daily Living (IADL)   Previous Responsibilities meal prep;housekeeping;laundry;shopping;yardwork;medication management;finances;driving;work   IADL Comments Pt works full-time   Activities of Daily Living Analysis   Impairments Contributing to Impaired Activities of Daily Living cognition impaired   ADL Comments Decreased activity tolerance   General Therapy Interventions   Planned Therapy Interventions ADL retraining;neuromuscular re-education   Clinical Impression   Criteria for Skilled Therapeutic Interventions Met yes, treatment indicated   OT Diagnosis Decreased I and safety with ADL/IADLs   Influenced by the following impairments Impaired cognition, safety, and activity tolerance   Assessment of Occupational Performance 1-3 Performance Deficits   Identified Performance Deficits IADLs   Clinical Decision Making (Complexity) Low complexity   Therapy Frequency  "3x/week   Predicted Duration of Therapy Intervention (days/wks) 7 days   Anticipated Discharge Disposition Home with Assist;Home with Outpatient Therapy   Risks and Benefits of Treatment have been explained. Yes   Patient, Family & other staff in agreement with plan of care Yes   [facility] TM \"6 Clicks\"   2016, Trustees of [facility], under license to [id number].  All rights reserved.   6 Clicks Short Forms Daily Activity Inpatient Short Form   [facility]  \"6 Clicks\" Daily Activity Inpatient Short Form   1. Putting on and taking off regular lower body clothing? 3 - A Little   2. Bathing (including washing, rinsing, drying)? 3 - A Little   3. Toileting, which includes using toilet, bedpan or urinal? 3 - A Little   4. Putting on and taking off regular upper body clothing? 4 - None   5. Taking care of personal grooming such as brushing teeth? 4 - None   6. Eating meals? 4 - None   Daily Activity Raw Score (Score out of 24.Lower scores equate to lower levels of function) 21   Total Evaluation Time   Total Evaluation Time (Minutes) 12     "

## 2019-03-17 NOTE — PLAN OF CARE
Discharge Planner SLP   Patient plan for discharge: Not stated this date.   Current status: Bedside swallow evaluation completed this date per conclusive MRI findings indicating tiny punctate areas of subtle restricted diffusion in both hemispheres suggesting tiny ischemic infarcts. Pt aware of current medial status, and able to follow along with conversation. SLP assessed pt ability to complete generative naming tasks, identify functional use of given objects, as well as generate complex sentences. Pt reported word finding difficulties, however could not describe an example, nor were any instances observed during 30 min evaluation. Per chart review and discussion with RN, pt family reported pt speech/language back to baseline. Per discussion with pt, pt verbalized no immediate concerns with swallow function, as well as no prior history of a modified diet. SLP assessed po trials of thin liquids, pureed and regular solids. No overt s/sx of aspiration noted with demonstration of timely swallow initiation and response. Recommend continuation of regular solids with thin liquids with use of safe swallow precautions including sitting upright, small bites/sips, alternate liquids/solids. Pt does not meet criteria for skilled ST intervention. ST will complete orders at this time. Please re-order as indicated.   Barriers to return to prior living situation: None per ST.   Recommendations for discharge: Defer to medial team.   Rationale for recommendations: None per ST. Skilled ST intervention not indicated at this time.        Entered by: Jammie Quan 03/17/2019 2:00 PM

## 2019-03-17 NOTE — PLAN OF CARE
A&Ox4. Neuros intact, except slurred speech, but denied numbness in LUE, BUE tremors noted as well. VSS, on 1L O2 for comfort. Tele NSR. Regular diet. Up with SBA. C/o back pain, managed w/ tylenol. Plan for lung biopsy tomorrow, neurology and oncology following. Nursing will continue to monitor.

## 2019-03-17 NOTE — CONSULTS
Consult Date:  03/17/2019      REQUESTING PHYSICIAN:  Dr. Shereen Carter      REASON FOR CONSULTATION:  Acute CVI and suspected lung cancer.       HISTORY:  Medical records reviewed, history obtained, and patient examined.      Primo Dorsey is a 78-year-old man with history of COPD and long time tobacco smoking who was seen initially in the ER on 01/09/2019 with shortness of breath and abdominal pain.  Scan showed a tiny acute subsegmental PE in the right upper lobe in addition to left lower lobe lung mass with lymphadenopathy and adrenal mass concerning for malignancy.  He was placed on Lovenox and was supposed to follow up, but after 10 days of Lovenox, he stopped and he did not follow up.  He presented to the hospital on 03/16/2019 with an episode of difficulty speaking around 1 p.m. and his sentences did not make sense to his family.  The episode resolved and he had some numbness off and on, lasting for seconds in his left arm.  Currently, he has no residual symptoms.  He is hard of hearing but alert, oriented.  CT scan done on 03/08/2019, which showed resolution of the blood clots, there is no change in the 3 cm mass in the superior segment of the left lower lobe, no change in the patchy consolidation of the left upper lobe, but significant increase in the mediastinal, prevascular, axillary, supraclavicular lymph nodes concerning for malignancy.  For example, prevascular lymph node 2.9 cm compared to 1.6 previously, left axillary lymph node 2.7 x 2.0, compared to 1.4 x 0.9 previously.  Increase in the size of a right adrenal mass, 4.7 x 2.6 cm.  He was supposed to be seen in our office on Thursday, but he presented to the hospital with the aforementioned symptoms.      PAST MEDICAL HISTORY:  COPD, not requiring oxygen, tobacco dependence until 12/2018, allergic rhinitis, appendectomy, bilateral cataract surgery, umbilical hernia repair, tonsillectomy and inguinal hernia repair.      MEDICATIONS:  He is currently  on:   1.  Aspirin.   2.  Fluticasone.   3.  Vilanterol inhaler.   4.  Gadobutrol 1 dose given before MRI.   5.  Zocor.      ALLERGIES:  NO KNOWN ALLERGIES.      CODE STATUS:  DNR/DNI noted.      SOCIAL HISTORY:  He lives alone at a house.  He is independent, able to maintain his home and drive and do his own shopping, although he had some fatigue, slowing down recently.  Smoked 3/4 of a pack a day for 45 years, quit in 2018.  No alcohol.      FAMILY HISTORY:  His father had CVA at age 60.  Brother had cancer in the lung and stomach,  at age 90.      REVIEW OF SYSTEMS:  Significant for the aforementioned.  He has no weight loss, anorexia, chest pain, GI or  symptoms.  His neurological symptoms resolved.      PHYSICAL EXAMINATION:   VITAL SIGNS:  Stable as charted.  Afebrile.  His oxygen was 95% on room air.  Weight is 127 pounds.   HEENT:  Normocephalic, atraumatic.  Sclerae are anicteric.  Extraocular movements intact.   NECK:  No JVD, no lymphadenopathy.   LUNGS:  Clear to auscultation.  No crackles, no dullness.   HEART:  Regular rhythm.  No murmurs or gallops.   ABDOMEN:  Soft, nontender.  No masses.  No hepatosplenomegaly.  Bowel sounds are present.   EXTREMITIES:  No cyanosis, clubbing or edema.   LYMPHATICS:  No lymphadenopathy in cervical, supraclavicular, epitrochlear, inguinal areas.   SKIN:  Limited skin examination.  No petechia or ecchymosis.      ANCILLARY DATA:  Creatinine 0.68, calcium 9, bilirubin 0.1.  Liver enzymes normal.  White count 14, hemoglobin 13.6, platelets 226.  CT scan of chest, abdomen and pelvis from recently as detailed.  I did review the images independently.  MRI of the brain shows a tiny punctate areas of subtle restricted diffusion in both hemispheres suggesting tiny acute ischemic infarcts in addition to old left parietal and right corona radiata infarct.  MRA of the neck:  No evidence of stenosis or dissection.  MRA of the brain shows mild to moderate narrowing of  both distal M1 segments and moderate narrowing of the left M2 branch proximally.  A 2D echo is being done today.      IMPRESSION:   1.  Acute cerebrovascular injury without residual deficit, highly likely to be secondary to suspected malignancy.   2.  Left lower lobe mass with mediastinal and other lymphadenopathy in addition to adrenal gland lesion progressed over the last 2 months, highly suspected primary lung cancer with metastasis.   3.  Small pulmonary embolus treated with anticoagulation for 10 days.  The patient did not follow and currently on no anticoagulation.   4.  Reasonable health with ECOG performance status 1.   5.  Chronic obstructive pulmonary disease with tobacco dependence.        DISCUSSION AND RECOMMENATIONS:  I explained to Mr. Dorsey, his son and his daughter that the clinical picture is highly consistent with primary lung neoplasm with metastatic disease and tissue diagnosis is needed.  He is open to systemic therapy and therefore, tissue diagnosis will be pursued.  Biopsy from the adrenal gland or lymph node will be reasonable for diagnostic and staging purpose.  In regard to anticoagulation, I agree with Dr. Carter with the use of Lovenox as preferred anticoagulation in thrombosis episode associated with malignancy.  If Mr. Dorsey stays in the hospital until tomorrow, the biopsy can be done before discharge.  Otherwise, it can be arranged as an outpatient.  Lovenox dose once a day based on his weight, 90 mg daily, is reasonable and when he gets the biopsy, it can be done 24 hours post dose before resuming Lovenox.  PET scan will be considered, but if he has evidence of metastatic disease in the adrenal gland or non-regional lymphadenopathy, the disease will be considered stage IV and PET scan will not be needed.  I will arrange for followup in our office with the biopsy results for further recommendations.  I appreciate the opportunity to participate in the care of Primo Dorsey.          MASSIEL MEAD MD             D: 2019   T: 2019   MT: SHAHZAD      Name:     WYATT POE   MRN:      [id number]        Account:       [id number]   :      1940           Consult Date:  2019      Document: [id number]       cc: Shereen Carter MD

## 2019-03-17 NOTE — PROGRESS NOTES
Consult dictated:  Suspected lung cancer with mediastinal and adrenal mets  Acute CVI likely associated with malignancy  History of recent PE (has been off AC, pt did not follow)    Rec: agree with indefinite anticoagulation (lovenox preferable, 90 mg daily)           CT guided biopsy (adrenal lesion or axillary LN can be diagnostic and staging), if he stays in hospital until tomorrow it can be requested to be done 24 hr after lovenox dose, otherwise outpatient.  I will arrange f/u in office.

## 2019-03-17 NOTE — CONSULTS
[facility], [facility]    Vascular Neurology Consult    Name: Primo Dorsey  YOB: 1940  MRN: [id number]  Today's date: 3/17/2019  Source of information: Patient and chart review    Reason for consult:  Embolic infarcts    Chief Complaint:  Expressive aphasia and mild dysarthria    History of Present Illness:    Primo Dorsey is a 78 year old male with a PMH significant for COPD, history of tobacco use (quit in December 2018), hypertension who presented to the ED with expressive aphasia and mild dysarthria.    Patient was initially seen in the ED in Jan 2019 with abdominal pain and shortness of breath and was found to have a left lower lobe lung mass suspicious for malignancy as well as a tiny acute subsegmental PE in the right upper lobe for which he was treated with Lovenox for 10 days and a repeat CT showing resolution of the PE.  On 3/16/2019, he presented with expressive aphasia.  He was last known normal around 1 PM on 3/16 and then he was noted to have difficulty with word output and he had slurred speech and numbness in his left arm which lasted only a couple seconds. On arrival, he underwent MRI brain and MRA head and neck which showed evidence of acute/subacute embolic infarcts and some intracranial atherosclerosis without a large vessel occlusion or significant stenosis.  He not on any antiplatelets or anticoagulation in anticipation for a biopsy of his lesion.    The patient's medical, surgical, social, and family history were personally reviewed with the patient.  Past Medical History:   Diagnosis Date     COPD (chronic obstructive pulmonary disease) (H)      Hypertension       Past Surgical History:   Procedure Laterality Date     APPENDECTOMY       EYE SURGERY      bilateral cataract     HERNIORRHAPHY UMBILICAL N/A 5/12/2016    Procedure: HERNIORRHAPHY UMBILICAL;  Surgeon: Ariel Kaye MD;  Location: [facility]     LAPAROSCOPIC  HERNIORRHAPHY INGUINAL BILATERAL Bilateral 2016    Procedure: LAPAROSCOPIC HERNIORRHAPHY INGUINAL BILATERAL;  Surgeon: Ariel Kaye MD;  Location: [facility]     TONSILLECTOMY       Social History     Socioeconomic History     Marital status: Single     Spouse name: Not on file     Number of children: Not on file     Years of education: Not on file     Highest education level: Not on file   Occupational History     Not on file   Social Needs     Financial resource strain: Not on file     Food insecurity:     Worry: Not on file     Inability: Not on file     Transportation needs:     Medical: Not on file     Non-medical: Not on file   Tobacco Use     Smoking status: Former Smoker     Packs/day: 0.75     Years: 45.00     Pack years: 33.75     Types: Cigarettes     Last attempt to quit: 2018     Years since quittin.2     Smokeless tobacco: Never Used   Substance and Sexual Activity     Alcohol use: No     Alcohol/week: 0.0 oz     Comment: not for 10 years     Drug use: No     Sexual activity: Not Currently     Partners: Female   Lifestyle     Physical activity:     Days per week: Not on file     Minutes per session: Not on file     Stress: Not on file   Relationships     Social connections:     Talks on phone: Not on file     Gets together: Not on file     Attends Anabaptist service: Not on file     Active member of club or organization: Not on file     Attends meetings of clubs or organizations: Not on file     Relationship status: Not on file     Intimate partner violence:     Fear of current or ex partner: Not on file     Emotionally abused: Not on file     Physically abused: Not on file     Forced sexual activity: Not on file   Other Topics Concern     Parent/sibling w/ CABG, MI or angioplasty before 65F 55M? Not Asked   Social History Narrative     Not on file     Family History   Problem Relation Age of Onset     Cerebrovascular Disease Father         age 60     Allergies Daughter      Cancer  Brother         lung and stomach     Hypertension Sister      Hypertension Sister      Hypertension Brother      C.A.D. Brother         cabg     Diabetes No family hx of      Cancer - colorectal No family hx of      Prostate Cancer No family hx of      Current Facility-Administered Medications   Medication     0.9% sodium chloride + KCl 20 mEq/L infusion     acetaminophen (TYLENOL) Suppository 650 mg     acetaminophen (TYLENOL) tablet 650 mg     albuterol (PROAIR HFA/PROVENTIL HFA/VENTOLIN HFA) 108 (90 Base) MCG/ACT inhaler 2 puff     bisacodyl (DULCOLAX) Suppository 10 mg     fluticasone-vilanterol (BREO ELLIPTA) 200-25 MCG/INH inhaler 1 puff     hydrALAZINE (APRESOLINE) injection 10-20 mg     ipratropium - albuterol 0.5 mg/2.5 mg/3 mL (DUONEB) neb solution 3 mL     labetalol (NORMODYNE/TRANDATE) syringe 10-40 mg     levalbuterol (XOPENEX HFA) Inhaler 2 puff     LORazepam (ATIVAN) tablet 0.5 mg     Medication Instruction     melatonin tablet 1 mg     naloxone (NARCAN) injection 0.1-0.4 mg     ondansetron (ZOFRAN-ODT) ODT tab 4 mg    Or     ondansetron (ZOFRAN) injection 4 mg     polyethylene glycol (MIRALAX/GLYCOLAX) Packet 17 g     senna-docusate (SENOKOT-S/PERICOLACE) 8.6-50 MG per tablet 1 tablet    Or     senna-docusate (SENOKOT-S/PERICOLACE) 8.6-50 MG per tablet 2 tablet     simvastatin (ZOCOR) tablet 20 mg     sodium chloride (OCEAN) 0.65 % nasal spray 1-2 spray     Allergies   Allergen Reactions     No Known Drug Allergies      Nystatin      Review of Systems:  14-point review of systems was completed. The pertinent positives and negatives are in the HPI, and the remainder was negative unless otherwise mentioned.    Physical Exam:  /79 (BP Location: Left arm)   Pulse 72   Temp 97.9  F (36.6  C) (Oral)   Resp 16   Ht 1.829 m (6')   Wt 57.9 kg (127 lb 10.3 oz)   SpO2 95%   BMI 17.31 kg/m     General:  Pleasant.  No acute distress, resting comfortably in a chair   head:  Normocephalic,  atraumatic  Eyes:  No conjunctival injection, no scleral icterus.   Respiratory:  Non-labored breathing on room air. No accessory muscle use.  Cardiovascular:  Peripheral pulses intact. No carotid bruits.  Abdomen: Soft  Extremities:  Warm, dry without peripheral edema  Neurologic:    Mental Status Exam: Alert, awake. Fully oriented. Provides a thorough history.  No clear aphasia or dysarthria noted, patient is very hard of hearing  Cranial Nerves: EOMs intact, no nystagmus, facial movements symmetric, facial sensation intact to light touch, palate and uvula rise symmetrically, no deviation in uvula or tongue  Motor: Normal tone in all four extremities decreased bulk throughout, 5/5 strength throughout, mild tremors noted in left upper extremity on arm extension  Sensory: Sensation intact to light touch bilaterally  Coordination: Finger-nose-finger without dysmetria bilaterally.   Reflexes: 2+ and symmetric   Gait: Deferred    NIHSS  Exam Interval: Baseline   Score    Level of consciousness: (0)   Alert, keenly responsive    LOC questions: (0)   Answers both questions correctly    LOC commands: (0)   Performs both tasks correctly    Best gaze: (0)   Normal    Visual: (0)   No visual loss    Facial palsy: (0)   Normal symmetrical movements    Motor arm (left): (0)   No drift    Motor arm (right): (0)   No drift    Motor leg (left): (0)   No drift    Motor leg (right): (0)   No drift    Limb ataxia: (0)   Absent    Sensory: (0)   Normal- no sensory loss    Best language: (0)   Normal- no aphasia    Dysarthria: (0)   Normal    Extinction and inattention: (0)   No abnormality        Total Score:  0     Laboratory:  Lab Results   Component Value Date    WBC 14.4 (H) 03/16/2019    HGB 13.6 03/16/2019    HCT 41.5 03/16/2019     03/16/2019     03/16/2019    POTASSIUM 4.1 03/16/2019    CHLORIDE 99 03/16/2019    CO2 28 03/16/2019    BUN 20 03/16/2019    CR 0.68 03/16/2019     (H) 03/16/2019    DD 9.6 (H)  01/09/2019    NTBNPI 381 01/09/2019    TROPI <0.015 03/17/2019    AST 26 03/16/2019    ALT 23 03/16/2019    ALKPHOS 83 03/16/2019    BILITOTAL 0.5 03/16/2019     Imaging:  MRI - few tiny punctate areas of embolic infarcts with prior infarcts  MRA head and neck - multiple intracranial stenosis     Assessment/Recommendations:  Primo Dorsey is a 78 year old male with newly diagnosed lung cancer (biopsy pending), HTN, COPD presented with expressive aphasia and slurred speech. MRI brain showed evidence of multiple embolic infarcts and intracranial stenosis. Etiology is embolic, likely from underlying malignancy.     #Embolic acute and subacute infarcts  - Neuro checks q 4 hours  - Goal normotension  - TTE with Bubble Study  - Telemetry  - A1c, Lipid Panel, Troponin x 3  - PT/OT/SLP  - Stroke Education  - Recommend to start secondary stroke prevention after CT guided biopsy. Will follow up post procedure. As per Oncology, Lovenox 90 mg daily longterm.    DVT ppx: Mechanical  Diet: Regular   Code status: DNR/DNI  Disposition: Home after biopsy tomorrow    Please call us with any questions or concerns, case was seen and discussed with Dr. Samaria Harper MD  Vascular Neurology fellow  Pager # [phone]

## 2019-03-17 NOTE — PLAN OF CARE
SLP attempted to complete evaluation. Per discussion with RN, no concerns regarding swallowing. Per reported RN discussion with family, speech/language back to baseline. Pt unavailable to complete evaluation due to ECHO.

## 2019-03-18 NOTE — PROGRESS NOTES
Tolerated right CT guided adrenal biopsy well. Versed 1 mg IV and Fentanyl 50 mcg IV given for sedation. VSS, was drowsy and now alert. Denies any c/o. Will call report to pt's nurse. Pt ready for return to 70 per cart. Bandaid site soft, clean, dry.

## 2019-03-18 NOTE — CONSULTS
Care Transition Initial Assessment - RN        Met with: Patient, daughter Cesilia and son Hiren.  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     Contact information and PCP information verified: Yes  Lives With: alone   Living Arrangements: house     Insurance concerns: No Insurance issues identified  ASSESSMENT  Patient currently receives the following services:  None        Identified issues/concerns regarding health management: Met with patient and his son and daughter to discuss discharge plans.  Explained that OT has recommended OP OT if he is able to have family assist with driving and medication management, otherwise [facility] is recommended.  Daughter Cesilia stated she is here from out of town and will be staying with him at least for the next week, possibly longer.  She stated she would be able to drive for him, take him to OP appointments and assist with medication management.  His son lives nearby and is also available to assist.  She states he has an Oncology appointment on Wed. March 20 at 9:30.  If he needs follow up with PCP she will arrange.  Family or patient did not have any other questions or concerns about discharge planning at this time.    PLAN  Financial costs for the patient include TBD .  Patient given options and choices for discharge yes .  Patient/family is agreeable to the plan?  Yes  Patient anticipates discharging to home .     Patient anticipates needs for home equipment: No  Plan/Disposition: Home   Appointments:   Family to schedule.  Will assist if needed.    Care  (CTS) will continue to follow as needed.

## 2019-03-18 NOTE — PROGRESS NOTES
[facility]  [facility]  Hematology/oncology Progress Note            History:   Primo Dorsey is a 78-year-old man with history of COPD and long time tobacco smoking who was seen initially in the ER on 01/09/2019 with shortness of breath and abdominal pain.  Scan showed a tiny acute subsegmental PE in the right upper lobe in addition to left lower lobe lung mass with lymphadenopathy and adrenal mass concerning for malignancy.  He was placed on Lovenox and was supposed to follow up, but after 10 days of Lovenox, he stopped and he did not follow up.  He presented to the hospital on 03/16/2019 with an episode of difficulty speaking around 1 p.m. and his sentences did not make sense to his family.  The episode resolved and he had some numbness off and on, lasting for seconds in his left arm.  Currently, he has no residual symptoms.  He is hard of hearing but alert, oriented.  CT scan done on 03/08/2019, which showed resolution of the blood clots, there is no change in the 3 cm mass in the superior segment of the left lower lobe, no change in the patchy consolidation of the left upper lobe, but significant increase in the mediastinal, prevascular, axillary, supraclavicular lymph nodes concerning for malignancy.  For example, prevascular lymph node 2.9 cm compared to 1.6 previously, left axillary lymph node 2.7 x 2.0, compared to 1.4 x 0.9 previously.  Increase in the size of a right adrenal mass, 4.7 x 2.6 cm.  He was supposed to be seen in our office on Thursday, but he presented to the hospital with the aforementioned symptoms.  He feels well today, no new neuro symptoms, biopsy was not ordered. Had Echo with some abnormalities and IVAN is planned today.              Medications:       fluticasone-vilanterol  1 puff Inhalation Daily     losartan  100 mg Oral Daily     simvastatin  20 mg Oral At Bedtime                ROS:   RESP, CV, GI,, MUSCULOSKELETAL, HEME/ALLERGY/IMMUNE,  Skin: reviewed and negative  except the positives mentioned in this note            Physical Exam:       Vital Sign Ranges  Temp:  [97.9  F (36.6  C)-98.7  F (37.1  C)] 98.3  F (36.8  C)  Pulse:  [59-72] 62  Resp:  [16] 16  BP: (148-176)/(61-87) 148/76  SpO2:  [91 %-99 %] 96 %      I/O last 3 completed shifts:  In: 1566 [I.V.:1566]  Out: -     Constitutional: Awake, alert, cooperative, no apparent distress  HEENT: unremarkable  Neck: Supple, no adenopathy, thyroid symmetric, not enlarged and no tenderness  Lungs: No increased work of breathing, good air exchange, clear to auscultation bilaterally, no crackles or wheezing.  Cardiovascular: Regular rate and rhythm, normal S1 and S2,no murmur noted.  Abdomen: No scars, normal bowel sounds, soft, non-distended, non-tender, no masses palpated, no hepatosplenomegally.  Ext: no edema, cyanosis  No LN palpable in axillae.         Data:       ROUTINE LABS (Last four results)  CMP  Recent Labs   Lab 03/16/19  1350      POTASSIUM 4.1   CHLORIDE 99   CO2 28   ANIONGAP 7   *   BUN 20   CR 0.68   GFRESTIMATED >90   GFRESTBLACK >90   ANGEL 9.0   PROTTOTAL 7.5   ALBUMIN 3.7   BILITOTAL 0.5   ALKPHOS 83   AST 26   ALT 23     CBC  Recent Labs   Lab 03/16/19  1350   WBC 14.4*   RBC 4.75   HGB 13.6   HCT 41.5   MCV 87   MCH 28.6   MCHC 32.8   RDW 14.1        INRNo lab results found in last 7 days.  2 D Echo: The study was technically limited. The left ventricle is normal in size. There  is mild concentric left ventricular hypertrophy. Left ventricular systolic  function is normal. The visual ejection fraction is estimated at 55-60%. Grade  I or early diastolic dysfunction. No regional wall motion abnormalities noted.  The right ventricle is normal size. The right ventricular systolic function is  normal.  Normal left atrial size. The right atrium is mild to moderately dilated. A  contrast injection (Bubble Study) was performed that was negative for flow  across the interatrial septum.  The mitral  valve leaflets are moderately thickened. Irregular thickening of  the atrial surface of the anterior leaflet is noted on some images  (particularly image 35). Although this may represent an artifact from off axis  imaging, a IVAN should be considered to rule out valvular vegetations. There is  moderate (2+) mitral regurgitation.  There is moderate (2+) tricuspid regurgitation. The right ventricular systolic  pressure is approximated at 64.6 mmHg plus the right atrial pressure. Right  ventricular systolic pressure is elevated, consistent with severe pulmonary  hypertension.  No pericardial effusion.  No previous study for comparison.             Assessment and Plan:   IMPRESSION:   1.  Acute cerebrovascular injury without residual deficit, highly likely to be secondary to suspected malignancy.   2.  Left lower lobe mass with mediastinal and other lymphadenopathy in addition to adrenal gland lesion progressed over the last 2 months, highly suspected primary lung cancer with metastasis.   3.  Small pulmonary embolus treated with anticoagulation for 10 days.  The patient did not follow and currently on no anticoagulation.   4.  Reasonable health with ECOG performance status 1.   5.  Chronic obstructive pulmonary disease with tobacco dependence.          PLAN: will proceed with adrenal biopsy today  He will have IVAN, then needs lifelong anticoagulation (lovenox preferred)  Can be discharged from onc standpoint on lovenox and will arrange f/u in office this week with biopsy result.        Charles Ramos M.D.

## 2019-03-18 NOTE — PROGRESS NOTES
[facility]    Hospitalist Progress Note      Assessment & Plan   Primo Dorsey is a 78 year old male with PMH of COPD, HTN, HLP and recently diagnosed tiny acute subsegmental PE and lung mass- concerning for malignancy- admitted for evaluation of speech difficulties.     1. 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  - pt has suspected lung cancer- so hypercoagulable status related to malignancy likely contributing to his neurologic event  - ASA given in ER  - Tele- NSR with some PAC  - Neuro consult appreciated  - likely will need anticoagulation but hold it for now for planned CT-guided biopsy tomorrow; after the biopsy- Oncology rec to start Lovenox  - Neurochecks  - Hb A1c 6.3, LDL cholesterol 83, total choles 212  - continue PTA Zocor; resumed PTA Losartan 100 mg po daily today  - Echo- EF- 55-60%, no WMA; Bubble study negative; Irregular thickening of  the atrial surface of the anterior leaflet of the mitral valve- artifacts vs vegetations  - IVAN today  - speech improved, pt feels he is back to baseline; no numbness, no weakness  - PT/OT- home with outpt therapies?  - SLP- regular diet    2. Suspected advanced lung cancer  - initially diagnosed on CT chest in 1/2019 showed a 3 cm pulmonary nodular opacity within the left lower lobe in addition to a moderate-sized region of confluent and patchy consolidation within the left upper lobe; few mildly enlarged mediastinal and left hilar lymph nodes; also a 3.5 cm right adrenal nodule. This is suspicious for a  metastasis- but could also represent an adenoma.  - repeat CT in 3/2019- there was no change in 3 cm mass in the superior segment of the left lower and no change in moderate-sized area of confluent and patchy consolidation of the left upper lobe, concerning for malignancy but significantly increased size and number of mediastinal, prevascular, axillary and supraclavicular lymph nodes and iIncreased size of right adrenal mass, concerning for worsening metastatic disease.  - Oncology consult appreciated  - plan for CT-guided biopsy today  - follow up with Oncology     3. Recent tiny acute embolus in a subsegmental pulmonary artery in the right upper lobe  - diagnosed in 1/2019; was on Lovenox for 10 days  - plan to resume Lovenox 90 mg subcut daily after biopsy  - Oncology recommends lifelong AC    4. COPD  - long h/o smoking but quit smoking in Dec 2018  - was recently prescribed home O2 by PMD  - reports some chronic SOB  - no wheezing  - PTA Advair switched to Breo Ellipta  - Alb inh and duoneb prn    5. HLP  - resume PTA Zocor    6. Probable insomnia - continued PTA ativan q HS as PRN dose rather than scheduled. Not an ideal med for this patient.     DVT Prophylaxis: Pneumatic Compression Devices  Code Status: DNR/DNI  Expected discharge: later on today or tomorrow, recommended to prior living arrangement once tissue biopsy and IVAN done    Alexa Casanova MD    Interval History   Doing fine, speech back to baseline; still reports some back pain; denies numbness/weakness; no chest pain; reports some chronic SOB; no N/V, no abd pain; discussed with bedside RN and daughter    -Data reviewed today: I reviewed all new labs and imaging results over the last 24 hours. I personally reviewed the Echocardiogram image(s) showing as above.    Physical Exam   Temp: 98.3  F (36.8  C) Temp src: Oral BP: 148/76 Pulse: 62   Resp: 16 SpO2: 96 % O2 Device: None (Room air) Oxygen Delivery: 1/2 LPM  Vitals:    03/16/19 1356  03/17/19 0023 03/18/19 0031   Weight: 58.1 kg (128 lb) 57.9 kg (127 lb 10.3 oz) 57 kg (125 lb 10.6 oz)     Vital Signs with Ranges  Temp:  [97.9  F (36.6  C)-98.7  F (37.1  C)] 98.3  F (36.8  C)  Pulse:  [59-72] 62  Resp:  [16] 16  BP: (148-176)/(61-87) 148/76  SpO2:  [91 %-99 %] 96 %  I/O last 3 completed shifts:  In: 1566 [I.V.:1566]  Out: -     Constitutional: Awake, alert, NAD, hard of hearing  Respiratory: Diminished air entry, no wheezing, no rales, no crackles  Cardiovascular: S1S2, RRR, no murmurs no rubs  GI: abdomen- soft, nonT, nonD, BS present  Skin/Integumen: intact, no rashes, no cyanosis  Extremities- no leg swelling      Medications     0.9% sodium chloride + KCl 20 mEq/L 75 mL/hr at 03/18/19 0640     - MEDICATION INSTRUCTIONS -         fluticasone-vilanterol  1 puff Inhalation Daily     losartan  100 mg Oral Daily     simvastatin  20 mg Oral At Bedtime       Data   Recent Labs   Lab 03/18/19  0909 03/17/19  0435 03/16/19  1350   WBC  --   --  14.4*   HGB  --   --  13.6   MCV  --   --  87   PLT  --   --  226   INR 1.12  --   --    NA  --   --  134   POTASSIUM  --   --  4.1   CHLORIDE  --   --  99   CO2  --   --  28   BUN  --   --  20   CR  --   --  0.68   ANIONGAP  --   --  7   ANGEL  --   --  9.0   GLC  --   --  105*   ALBUMIN  --   --  3.7   PROTTOTAL  --   --  7.5   BILITOTAL  --   --  0.5   ALKPHOS  --   --  83   ALT  --   --  23   AST  --   --  26   TROPI  --  <0.015 <0.015       No results found for this or any previous visit (from the past 24 hour(s)).

## 2019-03-18 NOTE — DISCHARGE INSTRUCTIONS
Your risk factors for stroke or TIA (transient ischemic attack):    Your Risk Factors Your Results Normal Ranges   High blood pressure BP Readings from Last 1 Encounters:   03/18/19 140/71    Less than 120/80   Cholesterol              Total Lab Results   Component Value Date    CHOL 212 03/16/2019      Less than 150    Triglycerides   Lab Results   Component Value Date    TRIG 239 03/16/2019    Less than 150   LDL Lab Results   Component Value Date    LDL 83 03/16/2019       Less than 70   HDL Lab Results   Component Value Date    HDL 81 03/16/2019            Greater than 40 (men)  Greater than 50 (women)   Diabetes Recent Labs   Lab 03/16/19  1350   *    Fasting blood glucose    Smoking/tobacco use  Quit smoking and tobacco   Overweight  Lose 1-2 pounds a week   Lack of exercise  30 minutes moderate activity each day   Other risk factors include carotid (neck) artery disease, atrial fibrillation and stress. You may be on new medicine to treat high blood pressure, cholesterol, diabetes or atrial fibrillation.    Understanding Stroke Booklet given to patient. Please refer to booklet for further information.    Stroke warning signs and symptoms - CALL 911 right away for:  - Sudden numbness or weakness in the face, arm or leg (often on one side of the body).  - Sudden confusion or trouble understanding what is going on.  - Sudden blurred or decreased vision in one or both eyes.  - Sudden trouble speaking, loss of balance, dizziness or problems with coordination.  - Sudden, severe headache for no reason.  - Fainting or seizures.  - Symptoms may go away then come back suddenly.

## 2019-03-18 NOTE — PLAN OF CARE
A&O x4, slight forgetfulness. Baseline slurred speech, tremors to BUE, otherwise neuros intact. Elevated BP, but within parameters, 0.5L O2 via NC for comfort, other VSS. Tele NSR. REgular diet. Up with SBA, steady gait noted. Mild pain reported to right upper back, decreased with PRN Tylenol, cold packs ,and repositioning. Plan for CT-guided biopsy this AM and possible IVAN afterward. Discharge plan back to home after evals are complete.

## 2019-03-18 NOTE — PROGRESS NOTES
1040 Received in special echo room per cart from station 73 for scheduled IVAN. Denies pain or SOB.  1045 #20 (R) AC placed  1100 Robinol, lido gargle and benzocaine spray given per protocol.  1130 Time out with Dr. Chandler pre sedation for IVAN. Consent signed.  1153 Completed IVAN and bubble study, tolerated well. Total of Fentanyl 50 mcg and Versed 2mg given.  1230 VSS. NSR. Will transport per cart to care suites for further monitoring prior to scheduled biopsy.  1300 Awaiting CT guided biopsy in care suites.

## 2019-03-18 NOTE — PLAN OF CARE
A&O x 4. BUE tremors. Slurred speech, baseline per family. High SBP but still within given parameter. S.O.B with activities. PRN neb x 1. On 1L of oxygen. Tele NSR. Regular diet. Up with SBA. Back  pain decreased within tylenol. Plan lung biopsy tomorrow.

## 2019-03-18 NOTE — PROGRESS NOTES
RADIOLOGY PROCEDURE NOTE  Patient name: Primo Dorsey  MRN: [id number]  : 1940    Pre-procedure diagnosis: Right adrenal mass  Post-procedure diagnosis: Same    Procedure Date/Time: 2019  2:21 PM  Procedure: Biopsy.  Estimated blood loss: None  Specimen(s) collected with description: Core biopsy samples.  The patient tolerated the procedure well with no immediate complications.  I determined this patient to be an appropriate candidate for the planned sedation and procedure and reassessed the patient IMMEDIATELY PRIOR to sedation and procedure.    See imaging dictation for procedural details and findings.    Provider name: Noman Mistry  Assistant(s):None

## 2019-03-18 NOTE — PROGRESS NOTES
JOSEPH  D: Nurse paged SW due to pt wanting to discharge and needing O2.    I: Pt has not had oxygen before.  Clinic wrote prescription for oxygen, but was not yet filled as pt wound up at [facility]. JOSEPH paged [facility] twice and spoke to Denae.  Denae states the clinic stats are too old, and must be done within 48 hours (clinic visit was  3 days ago).  JOSEPH asked [facility] nurse to arrange for new stats to be done per Medicare rules.  JOSEPH paged [facility] again to arrange for O2 to be brought to pt at [facility].    A: Deferred.  P: SW to be contacted if further needs arise with discharge planning.

## 2019-03-18 NOTE — PLAN OF CARE
A&Ox4. Neuros intact, except baseline slurred speech and BUE tremors. VSS. Tele NSR. Regular diet. Up with SBA. Voiding adequately. Back pain managed w/ tylenol. Successful IVAN and biopsy. Pt back to floor at 1430, tolerating water and ice chips w/o difficulty. Plan to discharge to home either tonight or tomorrow. Nursing will continue to monitor.

## 2019-03-18 NOTE — PROGRESS NOTES
Patient has been assessed for Home Oxygen needs. Oxygen readings:    *Pulse oximetry (SpO2) = 83% on room air at rest while awake.    *SpO2 improved to 94% on 2 liters/minute at rest.    *SpO2 = 88% on room air during activity/with exercise.    *SpO2 improved to 97% on 2 liters/minute during activity/with exercise.

## 2019-03-18 NOTE — PROGRESS NOTES
Vascular Neurology Progress Note      Chief complaint: Altered Mental Status (After having lunch with family had sudden onset of confusion and loss of speech for 3-4 minutes, symptoms resolved upon ED arrival)      Overnight events: Pt was off the floor most of the morning for IVAN and bx. Reports he feels well now but a little tired. Family reports no further speech problems      Impression:  Ischemic Stroke due to embolic stroke from hypercoagulable state from malignancy    Recommendations:  1.  Lovenox recommended by oncology team-plan for lifelong a/c. If for some reason not able to be anticoagulated in the future, recommend at least he be on aspirin for secondary stroke prevention  2. Simvastatin 20mg  3. BP goal < 140/90  4. PT/OT/Speech    Patient follow up:  - in the next 1 week(s) with PCP  - in 4-6 weeks with local neurologist    Debora Cannon PA-C  Neurology  03/18/2019 3:01 PM  Pager: [phone]    ____________________________________________________________________      Medications    Scheduled medications    fluticasone-vilanterol  1 puff Inhalation Daily     losartan  100 mg Oral Daily     simvastatin  20 mg Oral At Bedtime       Infusion medications    0.9% sodium chloride + KCl 20 mEq/L 75 mL/hr at 03/18/19 3270     - MEDICATION INSTRUCTIONS -         PRN medications  Current Facility-Administered Medications   Medication Dose Route Frequency     acetaminophen  650 mg Rectal Q4H PRN     acetaminophen  650 mg Oral Q4H PRN     albuterol  2 puff Inhalation Q6H PRN     bisacodyl  10 mg Rectal Daily PRN     glucose  15-30 g Oral Q15 Min PRN    Or     dextrose  25-50 mL Intravenous Q15 Min PRN    Or     glucagon  1 mg Subcutaneous Q15 Min PRN     fentaNYL  25-50 mcg Intravenous Q5 Min PRN     flumazenil  0.2 mg Intravenous q1 min prn     hydrALAZINE  10-20 mg Intravenous Q1H PRN     ipratropium - albuterol 0.5 mg/2.5 mg/3 mL  1 vial Nebulization Q6H PRN     labetalol  10-40 mg Intravenous Q10 Min PRN      levalbuterol  2 puff Inhalation Q4H PRN     LORazepam  0.5 mg Oral At Bedtime PRN     - MEDICATION INSTRUCTIONS -   Does not apply Continuous PRN     melatonin  1 mg Oral At Bedtime PRN     midazolam  0.5-1 mg Intravenous Q4 Min PRN     naloxone  0.1-0.4 mg Intravenous Q2 Min PRN     naloxone  0.1-0.4 mg Intravenous Q2 Min PRN     ondansetron  4 mg Oral Q6H PRN    Or     ondansetron  4 mg Intravenous Q6H PRN     polyethylene glycol  17 g Oral Daily PRN     senna-docusate  1 tablet Oral BID PRN    Or     senna-docusate  2 tablet Oral BID PRN     sodium chloride  1-2 spray Nasal Q1H PRN       Allergies  Allergies   Allergen Reactions     No Known Drug Allergies      Nystatin          Physical Examination    Vital Signs:  Temp:  [97.9  F (36.6  C)-98.7  F (37.1  C)] 98.3  F (36.8  C)  Pulse:  [59-76] 62  Heart Rate:  [64-80] 80  Resp:  [14-18] 16  BP: (122-193)/(60-94) 155/82  SpO2:  [91 %-100 %] 95 %     Neuro:  Mental status: Awake, alert, attentive. Follows multistep commands. Speech is fluent, comprehension and repetition intact. No dysarthria.  Good historian.  No neglect.  Cranial nerves:  Pupils equal and reactive.  EOMI, visual fields full, face symmetric, facial sensation intact, shoulder shrug strong, palate rise symmetric, tongue/uvula midline, hearing severely diminished b/l  Motor: Tone normal. 5/5 strength in all 4 extremities. No atrophy.  Sensory: Intact to light touch   Coordination: Finger nose finger intact bilaterally  Gait: deferred postprocedure          Labs/Studies:  CBC:     Recent Labs   Lab 03/16/19  1350   WBC 14.4*   RBC 4.75   HGB 13.6   HCT 41.5        Basic Metabolic Panel:   Recent Labs   Lab 03/16/19  1350      POTASSIUM 4.1   CHLORIDE 99   CO2 28   BUN 20   CR 0.68   *   ANGEL 9.0     Liver panel:  Recent Labs   Lab Test 03/16/19  1350 01/09/19  2351   PROTTOTAL 7.5 7.5   ALBUMIN 3.7 3.7   BILITOTAL 0.5 0.5   ALKPHOS 83 87   AST 26 16   ALT 23 20     INR:  Recent  Labs   Lab Test 03/18/19  0909   INR 1.12      Lipid Profile:  Recent Labs   Lab Test 03/16/19  1350 08/20/18  0905  01/20/14  0843 06/06/13  0811   CHOL 212* 160   < > 164 167   HDL 81 66   < > 53 57   LDL 83 80   < > 93 94   TRIG 239* 69   < > 92 84   CHOLHDLRATIO  --   --   --  3.1 3.0    < > = values in this interval not displayed.     A1C:   Recent Labs   Lab Test 03/16/19  1350 08/21/17  0926   A1C 6.3* 5.5     Troponin I:   Recent Labs   Lab 03/17/19  0435 03/16/19  1350   TROPI <0.015 <0.015         Imaging:    IVAN:    Interpretation Summary     Left ventricular systolic function is normal.The visual ejection fraction is  estimated at 60-65%.  The right ventricular systolic function is normal.  Posterior mitral valve leaflet prolapse.Mildly thickened mitral valve  leaflets.There is moderate (2+) mitral regurgitation.  Mild aortic root dilatation.  A contrast injection (Bubble Study) was performed that was negative for flow  across the interatrial septum.

## 2019-03-18 NOTE — PROGRESS NOTES
Patient is on 1L nasal cannula with SpO2 96%. BBS clear diminished with all prn nebs tx is given as ordered. Will continue to monitor.    Dusty Herrera RT  3/18/2019

## 2019-03-18 NOTE — DISCHARGE SUMMARY
[facility]    Discharge Summary  Hospitalist    Date of Admission:  3/16/2019  Date of Discharge:  3/18/2019  8:05 PM  Discharging Provider: Alexa Casanova MD  Date of Service (when I saw the patient): 03/18/19    Discharge Diagnoses   Embolic acute and subacute infarcts  Suspected advanced lung cancer  Recent tiny acute embolus in a subsegmental pulmonary artery in the right upper lobe  COPD  Hypoxic respiratory failure  HTN  HLP      History of Present Illness   Primo Dorsey is a 78 year old male with PMH of COPD, HTN, HLP and recently diagnosed tiny acute subsegmental PE and lung mass- concerning for malignancy- admitted for evaluation of speech difficulties; for a detailed HPI- please refer to H&P done by Dr Shereen Carter on 03/16/2019.        Hospital Course   Primo Dorsey was admitted on 3/16/2019.  The following problems were addressed during his hospitalization:    1. Transient aphasia      Embolic acute and subacute infarcts  - presented with expressive aphasia, slurred speech and short episode of left arm numbness  - MRI brain showed- Few tiny punctate areas of subtle restricted diffusion in both hemispheres suggesting some tiny acute ischemic infarcts; old left parietal and right corona radiata infarcts.  - MRA head and neck- Mild to moderate narrowing of both distal M1 segments and moderate narrowing of a left M2 branch proximally.  - pt has suspected lung cancer- see below- so hypercoagulable status related to malignancy possible contributing to his neurologic event vs cardioembolic etiology  - ASA given in ER  - Tele- NSR with some PAC  - admitted to Neuro floor with frequent neurochecks  - his neuro deficits resolved  - he was seen by Neuro consult   - TTE showed EF- 55-60%, no WMA; Bubble study negative; Irregular thickening of the atrial surface of the anterior leaflet of the mitral valve- artifacts vs vegetations?; because of TTE finding- he underwent IVAN which was  negative for vegetations  - Hb A1c 6.3, LDL cholesterol 83, total choles 212  - continue PTA Zocor and PTA Losartan   - Neuro and Oncology recommended lifetime anticoagulation with Lovenox; he had been on Lovenox before so he was familiar with the Lovenox shots; discussed with his daughter who said she will reinforce to her father the importance of anticoagulation  - will discharge with cardiac event monitor  - PT/OT- recommended discharge home with outpt therapies  - SLP- regular diet  - follow up with Neurology in 4 weeks.     2. Suspected advanced lung cancer      Right adrenal mass  - initially diagnosed on CT chest in 1/2019 showed a 3 cm pulmonary nodular opacity within the left lower lobe in addition to a moderate-sized region of confluent and patchy consolidation within the left upper lobe; few mildly enlarged mediastinal and left hilar lymph nodes; also a 3.5 cm right adrenal nodule. This is suspicious for a metastasis- but could also represent an adenoma.  - repeat CT in 3/2019- there was no change in 3 cm mass in the superior segment of the left lower and no change in moderate-sized area of confluent and patchy consolidation of the left upper lobe, concerning for malignancy but significantly increased size and number of mediastinal, prevascular, axillary and supraclavicular lymph nodes and increased size of right adrenal mass, concerning for worsening metastatic disease.  - Oncology consult appreciated  - he had CT-guided biopsy of adrenal mass on 3/18; pathology report pending at the time of the discharge  - follow up with Oncology as scheduled     3. Recent tiny acute embolus in a subsegmental pulmonary artery in the right upper lobe  - diagnosed in 1/2019; was on Lovenox for 10 days but he stopped taking it  - plan to resume Lovenox 90 mg subcut daily after biopsy  - Oncology recommends lifelong AC     4. COPD      Hypoxic respiratory failure  - long h/o smoking but quit smoking in Dec 2018  - was  recently prescribed home O2 by PMD  - reports some chronic SOB but no wheezing, no evidence of COPD exacerbation during this hospitalization  - continue PTA Advair and Alb inh and duonebs prn after discharge     5. HLP  - continue PTA Zocor       Alexa Casanova MD    Significant Results and Procedures   Echocardiogram 03//17/2019    The study was technically limited. The left ventricle is normal in size. There is mild concentric left ventricular hypertrophy. Left ventricular systolic function is normal. The visual ejection fraction is estimated at 55-60%. Grade  I or early diastolic dysfunction. No regional wall motion abnormalities noted.  The right ventricle is normal size. The right ventricular systolic function is  normal.  Normal left atrial size. The right atrium is mild to moderately dilated. A contrast injection (Bubble Study) was performed that was negative for flow across the interatrial septum.  The mitral valve leaflets are moderately thickened. Irregular thickening of the atrial surface of the anterior leaflet is noted on some images (particularly image 35). Although this may represent an artifact from off axis imaging, a IVAN should be considered to rule out valvular vegetations. There is moderate (2+) mitral regurgitation.  There is moderate (2+) tricuspid regurgitation. The right ventricular systolic pressure is approximated at 64.6 mmHg plus the right atrial pressure. Right ventricular systolic pressure is elevated, consistent with severe pulmonary  hypertension.  No pericardial effusion.    IVAN 03/18/2019     Left ventricular systolic function is normal.The visual ejection fraction is  estimated at 60-65%.  The right ventricular systolic function is normal.  Posterior mitral valve leaflet prolapse.Mildly thickened mitral valve  leaflets.There is moderate (2+) mitral regurgitation.  Mild aortic root dilatation.  A contrast injection (Bubble Study) was performed that was negative for  flow  across the interatrial septum.    CT guided biopsy of right adrenal mass 03/18/2019.       Pending Results   These results will be followed up by Oncology  Unresulted Labs Ordered in the Past 30 Days of this Admission     Date and Time Order Name Status Description    3/18/2019 1403 Surgical pathology exam In process           Code Status   DNR / DNI       Primary Care Physician   Ralph Maldonado        Discharge Disposition   Discharged to home  Condition at discharge: Satisfactory    Consultations This Hospital Stay   NEUROLOGY IP CONSULT  PHYSICAL THERAPY ADULT IP CONSULT  OCCUPATIONAL THERAPY ADULT IP CONSULT  SPEECH LANGUAGE PATH ADULT IP CONSULT  SWALLOW EVAL SPEECH PATH AT BEDSIDE IP CONSULT  SMOKING CESSATION PROGRAM IP CONSULT  HEMATOLOGY & ONCOLOGY IP CONSULT  CARE TRANSITION RN/SW IP CONSULT    Time Spent on this Encounter   I, Alexa Casanova, personally saw the patient today and spent greater than 30 minutes discharging this patient.    Discharge Orders      Physical Therapy Referral      Occupational Therapy Referral      Reason for your hospital stay    Stroke     Follow-up and recommended labs and tests     Follow up with primary care provider, Ralph Maldonado, within 7 days for hospital follow- up.  No follow up labs or test are needed.    Follow up with Oncology as scheduled.  Follow up with Neurology in 4 weeks.     Activity    Your activity upon discharge: activity as tolerated, no driving for today.     When to contact your care team    Call your primary doctor or return to ER if you have any of the following: temperature greater than 100.5 or less than 96,  increased shortness of breath, dizziness, loss of consciousness, chest pain, abdominal pain, severe headache, numbness/weakness, slurred speech, abnormal bleeding- bloody stools, black stools, bloody urine.     DNR/DNI     Oxygen Adult    [facility] [place] [facility] Order 2 liter(s) by nasal cannula continuously with use of portable tank.  Expected treatment length is indefinite (99 months).. Test on conserving device as applicable.    Patients who qualify for home O2 coverage under the CMS guidelines require ABG tests or O2 sat readings obtained closest to, but no earlier than 2 days prior to the discharge, as evidence of the need for home oxygen therapy. Testing must be performed while patient is in the chronic stable state. See notes for O2 sats.    I certify that this patient, Primo Dorsey has been under my care and that I, or a nurse practitioner or physician's assistant working with me, had a face-to-face encounter that meets the face-to-face encounter requirements with this patient on 3/18/2019. The patient, Primo Dorsey was evaluated or treated in whole, or in part, for the following medical condition, which necessitates the use of the ordered oxygen. Treatment Diagnosis: COPD, suspected lung cancer    Attending Provider: Alexa Casanova  Physician signature: See electronic signature associated with these discharge orders  Date of Order: March 18, 2019     Diet    Follow this diet upon discharge:low salt, low cholesterol diet.     Discharge Medications   Current Discharge Medication List      START taking these medications    Details   enoxaparin (LOVENOX) 100 MG/ML syringe Inject 0.9 mLs (90 mg) Subcutaneous daily  Qty: 30 Syringe, Refills: 0    Comments: Future refills by PCP Dr. Ralph Maldonado with phone number [phone].  Associated Diagnoses: 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         CONTINUE these medications which have NOT CHANGED    Details   ADVAIR DISKUS 250-50 MCG/DOSE inhaler INHALE ONE PUFF BY MOUTH TWICE DAILY   Qty: 180 Inhaler, Refills: 1    Associated Diagnoses: Other emphysema (H)      albuterol (PROAIR HFA/PROVENTIL HFA/VENTOLIN HFA) 108 (90 Base) MCG/ACT inhaler Inhale 2 puffs into the lungs every 6 hours as needed for shortness of breath / dyspnea or wheezing      fluconazole (DIFLUCAN) 100  MG tablet 1 tab q3days  Qty: 3 tablet, Refills: 1    Associated Diagnoses: Thrush      fluticasone (FLONASE) 50 MCG/ACT spray Spray 1-2 sprays into both nostrils daily  Qty: 16 g, Refills: 1      ipratropium - albuterol 0.5 mg/2.5 mg/3 mL (DUONEB) 0.5-2.5 (3) MG/3ML neb solution Take 1 vial (3 mLs) by nebulization every 6 hours as needed for shortness of breath / dyspnea or wheezing  Qty: 30 vial, Refills: 0    Associated Diagnoses: COPD exacerbation (H)      levalbuterol (XOPENEX HFA) 45 MCG/ACT inhaler Inhale 2 puffs into the lungs every 4 hours as needed for shortness of breath / dyspnea or wheezing      LORazepam (ATIVAN) 0.5 MG tablet Take 1 tablet (0.5 mg) by mouth At Bedtime  Qty: 30 tablet, Refills: 0    Associated Diagnoses: Anxiety      losartan (COZAAR) 100 MG tablet Take 100 mg by mouth 2 times daily      simvastatin (ZOCOR) 20 MG tablet TAKE ONE TABLET (20 mg) BY MOUTH DAILY AT BEDTIME  Qty: 90 tablet, Refills: 3    Associated Diagnoses: Hyperlipidemia LDL goal <130      !! order for DME Equipment being ordered: Oxygen   2L Oxygen via nasal cannula with activity   Expected duration: lifetime  Pt needs portable oxygen for travel to work and doctors visit   See Office visit notes from [facility] 3/15/18  Qty: 1 each, Refills: 0    Associated Diagnoses: Chronic obstructive pulmonary disease, unspecified COPD type (H)      !! order for DME Dog walking three times weekly  Qty: 1 unit marking on an U-100 insulin syringe, Refills: 11    Associated Diagnoses: Other emphysema (H)       !! - Potential duplicate medications found. Please discuss with provider.        Allergies   Allergies   Allergen Reactions     No Known Drug Allergies      Nystatin      Data   Most Recent 3 CBC's:  Recent Labs   Lab Test 03/16/19  1350 02/28/19  1246 01/09/19  2351   WBC 14.4* 11.4* 15.9*   HGB 13.6 13.1* 13.3   MCV 87 90 89    224 207      Most Recent 3 BMP's:  Recent Labs   Lab Test 03/16/19  1350 01/09/19  2351 08/20/18  0905     135 140   POTASSIUM 4.1 3.9 4.0   CHLORIDE 99 101 105   CO2 28 23 24   BUN 20 17 16   CR 0.68 0.69 0.84   ANIONGAP 7 11 11   ANGEL 9.0 8.6 9.0   * 108* 100*     Most Recent 2 LFT's:  Recent Labs   Lab Test 03/16/19  1350 01/09/19  2351   AST 26 16   ALT 23 20   ALKPHOS 83 87   BILITOTAL 0.5 0.5     Most Recent INR's and Anticoagulation Dosing History:  Anticoagulation Dose History     Recent Dosing and Labs Latest Ref Rng & Units 3/18/2019    INR 0.86 - 1.14 1.12        Most Recent 3 Troponin's:  Recent Labs   Lab Test 03/17/19  0435 03/16/19  1350 01/09/19  2351   TROPI <0.015 <0.015 <0.015     Most Recent Cholesterol Panel:  Recent Labs   Lab Test 03/16/19  1350   CHOL 212*   LDL 83   HDL 81   TRIG 239*     Most Recent 6 Bacteria Isolates From Any Culture (See EPIC Reports for Culture Details):No lab results found.  Most Recent TSH, T4 and A1c Labs:  Recent Labs   Lab Test 03/16/19  1350 08/21/17  0926   TSH  --  1.60   A1C 6.3* 5.5     Results for orders placed or performed during the hospital encounter of 03/16/19   MR Brain w/o & w Contrast    Addendum: 3/16/2019    WYATT POE  Accession # [id number]    The original report on this patient was dictated by myself.      ADDENDUM: Exam was performed without and with contrast. 10 mL Gadavist  given.    CAPRICE SHEETS MD (Date of Addendum: 3/16/2019 5:32 PM)    CAPRICE SHEETS MD      Narrative    MRI BRAIN WITHOUT AND WITH CONTRAST  3/16/2019 5:09 PM    HISTORY:  TIA, initial exam; 3 minutes of word salad.     TECHNIQUE:  Multiplanar, multisequence MRI of the brain without and  with    COMPARISON: None.    FINDINGS: Diffusion-weighted images show some minimal tiny areas of  restricted diffusion seen in both hemispheres as well as a larger area  of T2 shine through in the right corona radiata. The latter is  consistent with old ischemia. The former are consistent with new tiny  ischemic infarcts. Cerebral atrophy and moderately extensive white  matter  changes are noted along with an old left parietal infarct.  Vascular structures are patent at the skull base. Exam is somewhat  limited due to motion artifact. Postcontrast images do not show any  abnormal areas of enhancement or any focal mass lesions. Vascular  structures are patent at the skull base.      Impression    IMPRESSION:   1. Few tiny punctate areas of subtle restricted diffusion in both  hemispheres suggesting some tiny acute ischemic infarcts.  2. 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  3. Cerebral atrophy with chronic white matter changes.      CAPRICE SHEETS MD   MR Head w/o Contrast Angiogram    Narrative    MRA BRAIN ([place] OF [place]) WITHOUT CONTRAST 3/16/2019 4:38 PM     HISTORY: Transient aphasia.    TECHNIQUE: MR angiography was performed through the [place] [facility] [place]  without contrast.    FINDINGS: There is some smooth narrowing of the distal right M1  segment in the 50th percentile range and slight narrowing of the  distal left M1 segment in the 30th percentile range and some narrowing  of a left M2 branch in the 60th percentile range. The basilar artery  and distal internal carotid arteries are patent. The anterior and  posterior cerebral arteries are patent. No evidence for aneurysm.      Impression    IMPRESSION: Mild to moderate narrowing of both distal M1 segments and  moderate narrowing of a left M2 branch proximally. This could be due  to some atherosclerotic disease or possibly some vasospasm.     CAPRICE SHEETS MD   MR Neck w/o & w Contrast Angiogram    Narrative    MRA NECK (CAROTIDS) WITHOUT AND WITH CONTRAST 3/16/2019 5:19 PM     HISTORY: Transient aphasia.    TECHNIQUE: MR angiography was performed through the neck without and  with contrast. 10 mL Gadavist given. Carotid stenoses were evaluated  by comparing the caliber of the proximal internal carotid artery to  the caliber of the distal internal carotid arteries. Radiation dose  for this scan was reduced using  automated exposure control, adjustment  of the mA and/or kV according to patient size, or iterative  reconstruction technique.    FINDINGS:  Brachiocephalic vessels are patent off the arch. Both  carotid and vertebral systems are widely patent. No evidence for  stenosis or dissection.      Impression    IMPRESSION: Negative MR angiography of the neck.      CAPRICE SHEETS MD   CT Abdomen Retroperitoneal Biopsy    Narrative    CT ABDOMEN RETROPERITONEAL BIOPSY March 18, 2019 2:21 PM    HISTORY: Lung nodule, >=1cm; suspected lung cancer, right adrenal  gland biopsy.    COMPARISON: None.    MEDICATIONS: 6mL Lidocaine 1% locally, FERNANDA Franco, sedation  time 26 minutes, 1mg Versed, 50mcg Fentanyl.      TECHNIQUE: Axial images of the abdomen and pelvis were acquired  without contrast. Radiation dose for this scan was reduced using  automated exposure control, adjustment of the mA and/or kV according  to patient size, or iterative reconstruction technique.    FINDINGS: After written and oral informed consent was obtained and a  pause for the cause procedure verifying the correct procedure and the  correct patient, the area was prepped and draped in the usual sterile  fashion. The overlying soft tissues were anesthetized with 6 mL of 1%  subcutaneous lidocaine. Utilizing CT guidance six passes were made  into the right adrenal mass with an 18 gauge biopsy device and the  tissue was submitted to pathology. There were no immediate  complications.    CONSCIOUS SEDATION NOTE: After obtaining consent for sedation,  conscious sedation was induced using IV Versed and IV fentanyl.  Patient was monitored by nurse under my direct supervision throughout  the exam. There were no complications of the sedation. 15 minutes  face-to-face time.      Impression    IMPRESSION:  1. Technically successful right adrenal mass biopsy.  2. Conscious sedation.    СЕРГЕЙ HUERTA MD

## 2019-03-19 NOTE — TELEPHONE ENCOUNTER
TO PCP:     1) Rx for Albuterol pended - this is historical on med list     2) DME order pended for Nebulizer and supplies    3) Per [facility]    MAJOR interaction between Simvastatin and Fluconazole - increased risk of myopathy and rhabdomylosis     Please advise if patient to continue on both meds     Yareli HOLLOWAY RN

## 2019-03-19 NOTE — TELEPHONE ENCOUNTER
Reason for Call:  Other     Detailed comments: Daughter Cesilia called and would like a call back regarding the 3 things below...    -Please refill the albuterol (PROAIR HFA/PROVENTIL HFA/VENTOLIN HFA) 108 (90 Base) MCG/ACT inhaler    - Please order a new Nebulizer  -What is the Compatibility of the  Simvastain and Fluconazole?      Phone Number Patient can be reached at: Other phone number:  [phone]    Best Time: anyting    Can we leave a detailed message on this number? YES    Call taken on 3/19/2019 at 8:51 AM by Nidhi Teresa

## 2019-03-19 NOTE — TELEPHONE ENCOUNTER
"ED / Discharge Outreach Protocol    Patient Contact    Attempt # 1    Was call answered?  Yes.  \"May I please speak with Primo\"  Is patient available?   Yes      ED for acute condition Discharge Protocol    \"Hi, my name is Ketan Dyer, a registered nurse, and I am calling from [facility].  I am calling to follow up and see how things are going for you after your recent emergency visit.\"    Tell me how you are doing now that you are home?\" Just really tired, slept good last night      Discharge Instructions    \"Let's review your discharge instructions.  What is/are the follow-up recommendations?  Pt. Response: appt scheduled with oncologist tomorrow  Follow up with primary care provider, Ralph Maldonado, within 7 days for hospital follow- up.  No follow up labs or test are needed.    Follow up with Oncology as scheduled.  Follow up with Neurology in 4 weeks  activity as tolerated    \"Has an appointment with your primary care provider been scheduled?\"  No (needed - schedule appointment and remind to bring meds) Pt refused at this time and will call back    Medications    \"Tell me what changed about your medicines when you discharged?\"    Blood thinner and oxygen  Lovenox, Oxygen  \"What questions do you have about your medications?\"   None     Call Summary    \"What questions or concerns do you have about your recent visit and your follow-up care?\"     none    \"If you have questions or things don't continue to improve, we encourage you contact us through the main clinic number (give number).  Even if the clinic is not open, triage nurses are available 24/7 to help you.     We would like you to know that our clinic has extended hours (provide information).  We also have urgent care (provide details on closest location and hours/contact info)\"    \"Thank you for your time and take care!\"      Lobito TYSON RN  "

## 2019-03-19 NOTE — PLAN OF CARE
A&O x4 but forgetful. Neuros include slurred speech and slight rt tongue deviation. VSS. Tele SR with PAC. Regular diet. Up with SBA. Tylenol given for mid/low back pain. Patient discharged home at 1955.

## 2019-03-19 NOTE — TELEPHONE ENCOUNTER
Could we please auth the [place] to [facility] as the patient is in high risk and needs today if at all possible.

## 2019-03-19 NOTE — PLAN OF CARE
Occupational Therapy Discharge Summary    Reason for therapy discharge:    Discharged to home.    Progress towards therapy goal(s). See goals on Care Plan in [facility] electronic health record for goal details.  Goals not met.  Barriers to achieving goals:   discharge from facility.    Therapy recommendation(s):    Continued therapy is recommended.  Rationale/Recommendations:  Per POC, OP OT was recommended. Per chart/POC: Pt limited by impaired cognition, safety, and activity tolerance. Pt would benefit from follow up with OP OT to address cognitive deficit and safety with IADL participation. Until further recommendations by OP OT, strongly encouraged increased A from family for IADLs, such as transportation and med mgmt. If this level of A is not available at discharge, pt would benefit from home RN and home OT.    Pt. discharged to home 3/18. Pt.'s dtr. will be staying with him, able to drive to appointments.

## 2019-03-19 NOTE — TELEPHONE ENCOUNTER
Chief Complaint: History Of Embolic Stroke Without Residual Deficits, Cerebrovascular Accident (Cva), Unspecified Mechanism (H),MON 18-MAR-2019,ed/ip  1 / 0  [phone] (home)

## 2019-03-20 NOTE — TELEPHONE ENCOUNTER
Pt's son, Hiren, came into clinic and also picked up Rx for nebulizer and handicap parking forms.

## 2019-03-21 NOTE — LETTER
Health Care Home - Access Care Plan    About Me  Patient Name:  Primo Dorsey    YOB: 1940  Age:                             78 year old   [place] MRN:            [id number] Telephone Information:   Home Phone [phone]   Mobile [phone]       Address:    [address] [address] Email address:  No e-mail address on record      Emergency Contact(s)  Name Relationship Lgl Grd Work Phone Home Phone Mobile Phone   1. MONE GOMES Sister   [phone]   2. YAMIL DORSEY Son    [phone]   3. GONSALO SILVA Daughter    [phone]             Health Maintenance: Routine Health maintenance Reviewed: Not assessed    My Access Plan  Medical Emergency 911   Questions or concerns during clinic hours Primary Clinic Line, I will call the clinic directly: [facility] - [phone]   24 Hour Appointment Line [phone] or  [phone]) (toll free)   24 Hour Nurse Line [phone] (toll free)   Questions or concerns outside clinic hours 24 Hour Appointment Line, I will call the after-hours on-call line:   [facility] [phone] or [phone]) (toll-free)   Preferred Urgent Care [facility], [phone]   Preferred Hospital [facility]  [phone]   Preferred Pharmacy [id number] - [place], [place] - [address] Behavioral Health Crisis Line The National Suicide Prevention Lifeline at 1-245.690.1365 or 917     My Care Team Members  Patient Care Team       Relationship Specialty Notifications Start End    Ralph Maldonado MD PCP - General Internal Medicine  7/18/17     Phone: [phone] Pager: [phone] Fax: [phone] [address] [facility] [address]    Ralph Maldonado MD Assigned PCP   7/23/17     Phone: [phone] Pager: [phone] Fax: [phone] [address] [facility] [address]     Lily Shanks, RN Clinic Care Coordinator Primary Care - CC  3/21/19            My Medical and Care Information  Problem List   Patient Active Problem List   Diagnosis     HL (hearing loss)     HTN (hypertension)     Hyperlipidemia LDL goal <130     Advance Care Planning     Tobacco use disorder     DJD (degenerative joint disease) of cervical spine     Bilateral inguinal hernia without obstruction or gangrene, recurrence not specified     Chronic obstructive pulmonary disease, unspecified COPD type (H)     Chronic seasonal allergic rhinitis, unspecified trigger     Lung mass     Anxiety     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

## 2019-03-21 NOTE — PROGRESS NOTES
Clinical Product Navigator RN reviewed chart; patient on payer product coverage.  Review results: Met referral criteria for Care Coordinator; referral to be sent.    Pt has had two recent inpatient stays and has a high risk score.  Please assess for needs/concerns.    Shauna Piña RN/Clinical Product Navigator

## 2019-03-21 NOTE — ED AVS SNAPSHOT
Emergency Department  [address] [place] [address]  Phone:  [phone]  Fax:  [phone]                                    Primo Dorsey   MRN: [id number]    Department:   Emergency Department   Date of Visit:  3/21/2019           After Visit Summary Signature Page    I have received my discharge instructions, and my questions have been answered. I have discussed any challenges I see with this plan with the nurse or doctor.    ..........................................................................................................................................  Patient/Patient Representative Signature      ..........................................................................................................................................  Patient Representative Print Name and Relationship to Patient    ..................................................               ................................................  Date                                   Time    ..........................................................................................................................................  Reviewed by Signature/Title    ...................................................              ..............................................  Date                                               Time          22EPIC Rev 08/18

## 2019-03-21 NOTE — LETTER
[facility] CARE COORDINATION  [address]  [facility] [address]    March 21, 2019    Primo Dorsey  [address] [facility] [address]      Dear Primo,    I am a clinic care coordinator who works with Ralph Maldonado MD at [facility]. I wanted to introduce myself and provide you with my contact information so that you can call me with questions or concerns about your health care. Below is a description of clinic care coordination and how I can further assist you.     The clinic care coordinator is a registered nurse and/or  who understand the health care system. The goal of clinic care coordination is to help you manage your health and improve access to the [facility] in the most efficient manner. The registered nurse can assist you in meeting your health care goals by providing education, coordinating services, and strengthening the communication among your providers. The  can assist you with financial, behavioral, psychosocial, chemical dependency, counseling, and/or psychiatric resources.    Please feel free to contact me with any questions or concerns. We at [facility] are focused on providing you with the highest-quality healthcare experience possible and that all starts with you.     Sincerely,         Lily Shanks RN Care Coordinator  [facility]  Phone:  [phone] (Mondays, Wednesdays & Fridays)  Phone:  [phone] (Tuesdays & Thursdays)  Email: [email or web address]    Enclosed: I have enclosed a copy of a 24 Hour Access Plan. This has helpful phone numbers for you to call when needed. Please keep this in an easy to access place to use as needed.

## 2019-03-21 NOTE — PROGRESS NOTES
Clinic Care Coordination Contact  [facility]/Voicemail    Referral Source: Care Team Member - Clinic RN    Clinical Data: Care Coordinator Outreach  Patient was admitted to [facility] from 3/16/19 to 3/18/19 to evaluate speech difficulties. He was discharged home, with the following diagnoses:    Transient aphasia    Embolic acute and subacute infarcts    Suspected advanced lung cancer    Recent tiny acute embolus in a subsegmental pulmonary artery in the right upper lobe    COPD    Hypoxic respiratory failure    HTN    HLP  The recommendations at discharge were to follow up with:  1. His primary care provider, Ralph Maldonado, within 7 days for hospital follow- up.  No follow up labs or test are needed.    2. Oncology, as scheduled.  3. Neurology, in 4 weeks.    He was also discharge with new home oxygen order, 2 liters, by nasal cannula, continuously, with use of portable tank.     Outreach attempted x 1.  Left message on voicemail with call back information and requested return call.    Plan: Care Coordinator will mail out care coordination introduction letter with contact information and explanation of care coordination services. Care Coordinator will try to reach patient again in 1-2 business days.    Lily Shanks RN Care Coordinator  [facility]  Phone:  [phone] (Mondays, Wednesdays & Fridays)  Phone:  [phone] (Tuesdays & Thursdays)  Email: [email or web address]

## 2019-03-22 NOTE — ED NOTES
Bed: ED20  Expected date: 3/21/19  Expected time: 11:40 PM  Means of arrival: Ambulance  Comments:  Roddy [address] 78M Abd. Pain/ HTN

## 2019-03-22 NOTE — ED PROVIDER NOTES
History     Chief Complaint:  Abdominal pain       HPI   Primo Dorsey is a 78 year old male with a past medical history significant for appendectomy who presents for evaluation of abdominal pain. He states that he felt constipated but has never experienced abdominal pain like his current pain with constipation. Patient also reports he had urgency and dysuria and at the time felt like he was going to pass out. He also experienced difficulty urinating. He denies fever and vomiting.       Allergies:  Nystatin      Medications:    ADVAIR DISKUS 250-50 MCG/DOSE inhaler  albuterol (PROAIR HFA/PROVENTIL HFA/VENTOLIN HFA) 108 (90 Base) MCG/ACT inhaler  enoxaparin (LOVENOX) 100 MG/ML syringe  fluconazole (DIFLUCAN) 100 MG tablet  fluticasone (FLONASE) 50 MCG/ACT spray  ipratropium - albuterol 0.5 mg/2.5 mg/3 mL (DUONEB) 0.5-2.5 (3) MG/3ML neb solution  levalbuterol (XOPENEX HFA) 45 MCG/ACT inhaler  LORazepam (ATIVAN) 0.5 MG tablet  losartan (COZAAR) 100 MG tablet  simvastatin (ZOCOR) 20 MG tablet    Past Medical History:    COPD  Hypertension    Past Surgical History:    Appendectomy  Tonsillectomy  Herniorrhaphy   Eye surgery     Family History:    Cerebrovascular disease    Social History:  Smoking status: Former smoker  Alcohol use: No  Drug use: No  Marital Status:  Single [1]       Review of Systems   Constitutional: Negative for fever.   Gastrointestinal: Positive for abdominal pain and constipation. Negative for vomiting.   Genitourinary: Positive for difficulty urinating, dysuria and urgency.   All other systems reviewed and are negative.      Physical Exam     Patient Vitals for the past 24 hrs:   BP Temp Temp src Pulse Heart Rate Resp SpO2 Height Weight   03/22/19 0250 -- -- -- -- -- -- 96 % -- --   03/22/19 0248 172/82 -- -- 85 -- -- -- -- --   03/22/19 0020 170/83 -- -- 79 -- -- 98 % -- --   03/22/19 0001 (!) 184/105 -- -- 86 -- -- -- -- --   03/21/19 2349 (!) 205/118 98  F (36.7  C) Oral -- 89 20 100 %  1.829 m (6') 55.8 kg (123 lb)         Physical Exam  Vitals: reviewed by me  General: Pt seen on hospital [facility], [facility], cooperative, and alert to conversation  Eyes: Tracking well, clear conjunctiva BL  ENT: MMM, midline trachea.   Lungs:  No tachypnea, no accessory muscle use. No respiratory distress.   CV: Rate as above, regular rhythm.    Abd: Soft, does have some suprapubic and lower quadrant tenderness to palpation, but no guarding, no rebound.  Fullness felt.  MSK: no peripheral edema or joint effusion.  No evidence of trauma  Skin: No rash, normal turgor and temperature  Neuro: Clear speech and no facial droop.  Psych: Not RIS, no e/o AH/VH      Emergency Department Course     Imaging:  Radiographic findings were communicated with the patient who voiced understanding of the findings.    CT abdomen pelvis w contrast  IMPRESSION:  1. Large amount of stool impacted in the rectum and moderate stool  throughout the colon. Patient would likely benefit from an enema. Mild  associated perirectal stranding/edema. Proctitis is not excluded.   2. No other acute findings.  3. Stable right adrenal mass suspicious for metastasis.  4. 4 cm left renal mass is indeterminate but stable.  As read by radiology     Laboratory:  CMP: Glucose 118, Albumin 3.2, Protein total 6.7,(Creatinine 0.81)  CBC: WBC 11.5, HGB 11.7,     Interventions:  0000: Morphine 4 mg IV  0000: Zofran 4 mg IV  0001: NaCl bolus 1000 ml IV    Emergency Department Course:  Past medical records, nursing notes, and vitals reviewed.  2347: I performed an exam of the patient and obtained history, as documented above.    IV inserted and blood drawn.    The patient was sent for a CT abdomen pelvis w contrast while in the emergency department, findings above.    0133: I rechecked the patient. Explained findings to the patient.    0346: I rechecked the patient.  Findings and plan explained to the Patient. Patient discharged home with instructions regarding  "supportive care, medications, and reasons to return. The importance of close follow-up was reviewed.     Impression & Plan      Medical Decision Making:  Mr. Dorsey is a 78 year old male who presents to the emergency department with abdominal pain and constipation. I do believe his constipation is causing his abdominal pain, and this is confirmed by an otherwise benign CT scan. He does have some element of inflammation around his rectum on the CT scan, but I think this is likely due to severe constipation. He states that after receiving 2-3 enemas here, he feels \"300%\" better, and states that all of his rectal pressure and pain has dissipated. He did have a large amount of stool here in the ER, and was observed for some time after. As this is likely the cause of his rectal irritation on the CT scan, and as the source has been controlled, I do think that it is ok for the patient to go home. He feels very strongly that he wants to go home, though I did offer observation again based on the CT scan. We have agreed that the patient should go home, begin a bowel regiment, see his primary care doctor some time tomorrow if he has any abdominal discomfort, out of an abundance of caution. Family is ok with this plan, patient states he has numerous stool softeners at home that he has not been taking, but now will take them, we have also gone over increasing fiber and water intake. Patient is ok with plan, will discharge as above. Reassuringly, no evidence of obstruction or emergent surgical malady found on his CT scant either. Benign abdomen at time of discharge.        Diagnosis:    ICD-10-CM    1. Constipation, unspecified constipation type K59.00    2. Abnormal CT scan R93.89        Disposition:  discharged to home    Balwinder Cornell  3/21/2019    EMERGENCY DEPARTMENT    Scribe Disclosure:  I, Balwinder Cornell, am serving as a scribe at 11:47 PM on 3/21/2019 to document services personally performed by Ariel Pitts " Esvin PIMENTEL based on my observations and the provider's statements to me.          Ariel Pitts MD  03/22/19 1207

## 2019-03-22 NOTE — PROGRESS NOTES
Clinic Care Coordination Contact  Patient Outreach    S: Outreach contact    B: Refer to [facility]/Voicemail note from today at 10:05.    A: Patient's daughter Cesilia (consent to communicate is on file) called writer back. She stated patient is doing quite well, but is feeling tired. She declined any assistance with scheduling follow up visits at this time. She [place] in another [place] and is planning to go back home this weekend, as the rest of the family is pretty involved in her father's care. However, she was very pleased and appreciative of Care Coordination's contact.   She said her father is very hard-of-hearing and does not have hearing aids. That could be something Care Coordination could assist with, according to Cesilia. She also stated that written information might be the best way to communicate with him. Patient has just retired, she added.  This writer's contact information was provided to her and she is aware there is a CC letter being sent to patient with that information as well.     R: RN Care Coordinator will contact patient in 4 weeks. RN CC will be available, as needed, if patient decides to call before that.     Lily Shanks RN Care Coordinator  [facility]  Phone:  [phone] (Mondays, Wednesdays & Fridays)  Phone:  [phone] (Tuesdays & Thursdays)  Email: [email or web address]

## 2019-03-25 NOTE — PROGRESS NOTES
SUBJECTIVE:   Primo Dorsey is a 78 year old male who presents to clinic today for the following health issues:      ED/UC Followup:    Facility:   ED  Date of visit: 03/21/2019  Reason for visit: Constipation; Respiratory distress  Current Status: Discharged to home; patient states fatigue. Breathing has become more difficult overall and has to rely on his portable oxygen machine. He's easily tired when trying to exercise thus doesn't routinely do activities. His appetite and desire to eat are vastly decreased, especially after his ED admission for constipation -- uses home medication of [employer] brand stool softer (docuate sodium); last episode of BM was of small pellets of stool.              Problem list and histories reviewed & adjusted, as indicated.  Additional history: as documented    Patient Active Problem List   Diagnosis     HL (hearing loss)     HTN (hypertension)     Hyperlipidemia LDL goal <130     Advance Care Planning     Tobacco use disorder     DJD (degenerative joint disease) of cervical spine     Bilateral inguinal hernia without obstruction or gangrene, recurrence not specified     Chronic obstructive pulmonary disease, unspecified COPD type (H)     Chronic seasonal allergic rhinitis, unspecified trigger     Lung mass     Anxiety     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     Past Surgical History:   Procedure Laterality Date     APPENDECTOMY       EYE SURGERY      bilateral cataract     HERNIORRHAPHY UMBILICAL N/A 5/12/2016    Procedure: HERNIORRHAPHY UMBILICAL;  Surgeon: Ariel Kaye MD;  Location: [facility]     LAPAROSCOPIC HERNIORRHAPHY INGUINAL BILATERAL Bilateral 5/12/2016    Procedure: LAPAROSCOPIC HERNIORRHAPHY INGUINAL BILATERAL;  Surgeon: Ariel Kaye MD;  Location: [facility]     TONSILLECTOMY         Social History     Tobacco Use     Smoking status: Former Smoker     Packs/day: 0.75     Years: 45.00     Pack years: 33.75     Types: Cigarettes      Last attempt to quit: 2018     Years since quittin.2     Smokeless tobacco: Never Used   Substance Use Topics     Alcohol use: No     Alcohol/week: 0.0 oz     Comment: not for 10 years     Family History   Problem Relation Age of Onset     Cerebrovascular Disease Father         age 60     Allergies Daughter      Cancer Brother         lung and stomach     Hypertension Sister      Hypertension Sister      Hypertension Brother      C.A.D. Brother         cabg     Diabetes No family hx of      Cancer - colorectal No family hx of      Prostate Cancer No family hx of          Current Outpatient Medications   Medication Sig Dispense Refill     ADVAIR DISKUS 250-50 MCG/DOSE inhaler INHALE ONE PUFF BY MOUTH TWICE DAILY  180 Inhaler 1     albuterol (PROAIR HFA/PROVENTIL HFA/VENTOLIN HFA) 108 (90 Base) MCG/ACT inhaler Inhale 2 puffs into the lungs every 6 hours as needed for shortness of breath / dyspnea or wheezing 18 g 1     enoxaparin (LOVENOX) 100 MG/ML syringe Inject 0.9 mLs (90 mg) Subcutaneous daily 30 Syringe 0     fluconazole (DIFLUCAN) 100 MG tablet 1 tab q3days 3 tablet 1     fluticasone (FLONASE) 50 MCG/ACT spray Spray 1-2 sprays into both nostrils daily 16 g 1     ipratropium - albuterol 0.5 mg/2.5 mg/3 mL (DUONEB) 0.5-2.5 (3) MG/3ML neb solution Take 1 vial (3 mLs) by nebulization every 6 hours as needed for shortness of breath / dyspnea or wheezing 30 vial 0     levalbuterol (XOPENEX HFA) 45 MCG/ACT inhaler Inhale 2 puffs into the lungs every 4 hours as needed for shortness of breath / dyspnea or wheezing       LORazepam (ATIVAN) 0.5 MG tablet Take 1 tablet (0.5 mg) by mouth At Bedtime 30 tablet 0     losartan (COZAAR) 100 MG tablet Take 100 mg by mouth 2 times daily       order for DME Equipment being ordered: Nebulizer, mask, and tubing supplies 1 Units 0     order for DME Equipment being ordered: Oxygen   2L Oxygen via nasal cannula with activity   Expected duration: lifetime  Pt needs portable  oxygen for travel to work and doctors visit   See Office visit notes from [facility] 3/15/18 1 each 0     order for DME Dog walking three times weekly 1 unit marking on an U-100 insulin syringe 11     simvastatin (ZOCOR) 20 MG tablet TAKE ONE TABLET (20 mg) BY MOUTH DAILY AT BEDTIME 90 tablet 3     Allergies   Allergen Reactions     No Known Drug Allergies      Nystatin        Reviewed and updated as needed this visit by clinical staff  Tobacco  Allergies  Meds       Reviewed and updated as needed this visit by Provider         ROS:  CONSTITUTIONAL: NEGATIVE for fever, chills, change in weight  INTEGUMENTARY/SKIN: NEGATIVE for worrisome rashes, moles or lesions  EYES: NEGATIVE for vision changes or irritation  ENT/MOUTH: NEGATIVE for ear, mouth and throat problems  RESP: NEGATIVE for significant cough or SOB  BREAST: NEGATIVE for masses, tenderness or discharge  CV: NEGATIVE for chest pain, palpitations or peripheral edema  GI: NEGATIVE for nausea, abdominal pain, heartburn, or change in bowel habits  : NEGATIVE for frequency, dysuria, or hematuria  MUSCULOSKELETAL: NEGATIVE for significant arthralgias or myalgia  NEURO: NEGATIVE for weakness, dizziness or paresthesias  ENDOCRINE: NEGATIVE for temperature intolerance, skin/hair changes  HEME: NEGATIVE for bleeding problems  PSYCHIATRIC: NEGATIVE for changes in mood or affect  This document serves as a record of the services and decisions personally performed and made by Ralph Maldonado MD. It was created on his behalf by Enoch Edgar, a trained medical scribe. The creation of this document is based the provider's statements to the medical scribe.  Scribe Enoch Edgar 4:22 PM, March 25, 2019    OBJECTIVE:     /68 (BP Location: Left arm, Patient Position: Sitting, Cuff Size: Adult Regular)   Pulse 70   Temp 96.3  F (35.7  C) (Oral)   Ht 1.829 m (6')   Wt 58.1 kg (128 lb)   SpO2 98%   BMI 17.36 kg/m    Body mass index is 17.36 kg/m .   Anxious hard of hearing white male in  no acute distress  Neck was supple without adenopathy or thyromegaly his carotids were normal with a left carotid bruits   Chest clear to auscultation and percussion  Cardiovascular S1 and S2 are physiologic without murmurs or gallops  Abdomen bowel sounds were normal.  There is no palpable mass or organomegaly  Extremities nontender without any edema;   Pulses pedal pulses are as described otherwise his pulses are bilaterally symmetrical throughout without bruits  Skin without significant abnormality    ASSESSMENT/PLAN:   Primo was seen today for hospital f/u.    Diagnoses and all orders for this visit:    Chronic obstructive pulmonary disease, unspecified COPD type (H)  Being aggravated by his anxiety associated with his lung cancer; encouraged him to do some minor activities regularly to improve his endurance and as a stress release valve -- increasing the lorazepam to be able to take bid prn   -     Basic metabolic panel  -     CBC with platelets    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  Having mostly vision symptoms, diplopia     Essential hypertension  -     Basic metabolic panel  -     CBC with platelets    Malignant neoplasm metastatic to both lungs (H)  Re-evaluating his treatment options for his lung cancer  -     Basic metabolic panel  -     CBC with platelets    Tobacco use disorder  In remission since January 1st.   Anxiety    ROBLES (dyspnea on exertion)    Constipation  Stool softener bid and avoiding the benefiber because the risk of compromising his breathing     The information in this document, created by the medical scribe for me, accurately reflects the services I personally performed and the decisions made by me. I have reviewed and approved this document for accuracy prior to leaving the patient care area.  4:22 PM, 03/25/19    Ralph Maldonado MD  [facility]

## 2019-03-26 NOTE — PROGRESS NOTES
Clinic Care Coordination Contact  OUTREACH    Referral Information: Patient's daughter: Cesilia.    Primary Diagnosis: Oncology (new diagnosis of lung cancer)    Chief Complaint   Patient presents with     Clinic Care Coordination - Follow-up     Inquiry     Patient's daughter, Cesilia, outreached to RN Care Coordinator and writer engaged in AIDET communication during the encounter. Cesilia is concerned about her father's new and serious diagnoses. Although they have not received the pathology results yet, she is concerned about him being able to have all the support and assistance he needs.     Universal Utilization: Appropriate  Clinic Utilization  Difficulty keeping appointments:: No  Compliance Concerns: No  No-Show Concerns: No  No PCP office visit in Past Year: No  Utilization    Last refreshed: 3/25/2019 11:33 PM:  Hospital Admissions 1           Last refreshed: 3/25/2019 11:33 PM:  ED Visits 2           Last refreshed: 3/25/2019 11:33 PM:  No Show Count (past year) 0              Current as of: 3/25/2019 11:33 PM            Clinical Concerns:  Current Medical Concerns:      Current Behavioral Concerns: Possible isolation, due to being hard of hearing and newly retired. Anxiety and depression due to new diagnoses.  Education Provided to patient: about care coordination   Pain  Pain (GOAL):: No  Health Maintenance Reviewed: Up to date  Clinical Pathway: None    Medication Management: no questions or concerns at this time.     Functional Status:  Dependent ADLs:: Independent  Dependent IADLs:: Independent  Bed or wheelchair confined:: No  Mobility Status: Independent  Fallen 2 or more times in the past year?: No  Any fall with injury in the past year?: No    Living Situation:  Current living arrangement:: I live alone  Type of residence:: Private home - no stairs    Diet/Exercise/Sleep:  Diet:: Low saturated fat, No added salt  Inadequate nutrition (GOAL):: Yes. Patient has lost weight and does not have an appetite.  Food  Insecurity: No  Tube Feeding: No  Exercise:: Unable to exercise. Patient has been feeling overly tired and with low energy.  Inadequate activity/exercise (GOAL):: No    Transportation:  Transportation concerns (GOAL):: No  Transportation means:: Family     Psychosocial:  Denominational or spiritual beliefs that impact treatment:: No  Mental health DX:: Yes(anxiety)  Mental health DX how managed:: Medication, [facility] at [facility]  Mental health management concern (GOAL):: No  Informal Support system:: Family     Financial/Insurance: BCBS/BCBS MEDICARE ADVANTAGE  Financial/Insurance concerns (GOAL):: No     Resources and Interventions:  Current Community Resources: None  Supplies used at home:: Oxygen Tubing/Supplies, Nebulizer tubing(will be using)  Equipment Currently Used at Home: none    Advance Care Plan/Directive  Advanced Care Plans/Directives on file:: Yes  Type Advanced Care Plans/Directives: Advanced Directive - On File  Advanced Care Plan/Directive Status: Not Applicable    Referrals Placed: Community Resources (PT/OT)     Goals:   Goals        General    Ability to hear well (pt-stated)     Notes - Note created  3/26/2019  4:19 PM by Lily Shanks RN    Goal Statement: Patient is hard of hearing and both him and his family want his hearing capacity to improve.  Measure of Success:    Patient will not feel stressed out for not being able to communicate with others over the phone.    Patient will answer the phone right away, instead of trying to understand voice messages.    Patient will be able to talk on the phone and understand what the speaker is communicating.  Supportive Steps to Achieve:     RN Care Coordinator will provide information to patient and family about hearing aides/devices.    Patient will purchase hearing aide/device to assist him with his loss of hearing.  Barriers: Patient has multiple new diagnoses to cope with.   Strengths: Patient has health insurance and supportive  family.  Date to Achieve By: 6/26/19  Patient expressed understanding of goal: Yes.        Healthy Eating (pt-stated)     Notes - Note created  3/26/2019  4:10 PM by Lily Shanks RN    Goal Statement: Patient and his daughter want to find meals that he has an appetite for, so he does not lose more weight.  Measure of Success:     Patient will not lose weight in the next 3 months.    Patient will maintain current weight or higher for the next 6 months.  Supportive Steps to Achieve:     Patient will make a list of his favorite foods.    RN Care Coordinator will assist patient with resources.  Barriers:     Patient lives on his own.     Daughter lives in [place] and is staying until they have a better grasp of his health condition.    Son lives in [place], but availability is limited.  Strengths: Good family support system.  Date to Achieve By: 6/26/19  Date to Sustain until: 9/26/19  Patient expressed understanding of goal: Yes.          Psychosocial (pt-stated)     Notes - Note created  3/26/2019  4:29 PM by Lily Shanks RN    Goal Statement: Patient and his family want to find alternatives for his health care and living condition, as he has new cancer diagnosis.  Measure of Success: Patient will:     feel comfortable to choose alternatives to keep his independence.    be able to have good meals delivered, if he is not able to cook for himself.    have access to transportation to go to appointments/treatment in case he can not drive himself or family is not available.  Supportive Steps to Achieve:    RN Care Coordinator will provide resources to patient.    Patient and his family will voice their likes/dislikes to guide search for resources.  Barriers: Patient has just retired and is used to being independent.  Strengths: Good family support system.  Date to Achieve By: 7/26/19  Patient expressed understanding of goal: Yes.            Outreach Frequency: weekly    Plan: Patient will await for pathology  results.  RN Care Coordinator will send resources to patient by mail.  RN Care Coordinator will check in with patient and his daughter in a week.

## 2019-03-26 NOTE — LETTER
[facility]  [address]  [facility] [address]    March 27, 2019    Primo Dorsey  [address] [facility] [address]      Dear Jose Tillman, I wanted to thank your daughter Cesilia for calling me yesterday.      The goal of clinic care coordination is to help you manage your health and improve access to the [place] system in the most efficient manner. As a registered nurse care coordinator, I can assist you in meeting your health care goals by providing education, coordinating services, and strengthening the communication among your providers. As a  care coordinator, Wendy Bahena can assist you with financial, behavioral, psychosocial, chemical dependency, counseling, and/or psychiatric resources. However, we support each other and combine our knowledge, so that you do not have to necessarily work with both of us. We want to assist you in any way we can.    Cesilia, I am sending you a guide, but just to go through the pages for curiosity. I understand it is not time for that. I also wanted to give you a web site address we use a lot: [email or web address].     Please feel free to contact me or Wendy ([phone]) with any questions or concerns. Wendy is at the Clinic on Tuesdays, Wednesdays and Thursdays, and available on the number [phone] from Monday to Friday.    Sincerely,       Lily Shanks RN Care Coordinator  [facility]  Phone:  [phone] (Mondays, Wednesdays & Fridays)  Phone:  [phone] (Tuesdays & Thursdays)  Email: [email or web address]    Enclosed: [facility] Senior Housing Guide & Resources, 9090-8195 edition. I am just sending it as a reference guide, so you know what is out there in terms of resources.

## 2019-03-29 NOTE — TELEPHONE ENCOUNTER
"Spoke with Cesilia with pt present    Was given about 4 enemas at hospital     2 BID - stool softeners (Colace)     Last \"good\" stool was after enemas     Has had tiny stools but last good BM was   Pretty hard BM yesterday  Wednesday was last normal     A little abdominal pain - lower, left/center     Bloated? A little bit, not a lot, uncomfortable (throbbing pain)     No bleeding with stools     N/V? A little nausea not vomiting     Lack of appetite    Drinking lots of water   Prune juice? Not yet   Had fibrous foods - fruit, cabbage  1 TBSP of olive oil     Activity - trying to increase    Discussed home care instructions ([place] Triage) for constipation and symptoms to seek emergency care for. Also discussed UC hours.     They will try HC methods and OTC laxative, prune juice, warm fluids, hot bath, increased activity and fluids, and if no improvement or any worsening will plan UC visit     Yareli HOLLOWAY RN                      "

## 2019-03-29 NOTE — TELEPHONE ENCOUNTER
Reason for call:  Patient reporting a symptom    Symptom or request: constipation    Duration (how long have symptoms been present): 1 week+    Have you been treated for this before? Yes - ER on 3.21.19 & OV on 3.25.19    Additional comments: Pt is defecating a little but is starting to become very uncomfortable again    Phone Number patient's daughter Cesilia can be reached at:  [phone]  *C2C verified    Best Time:  any    Can we leave a detailed message on this number:  YES    Call taken on 3/29/2019 at 8:19 AM by Chayo Mazariegos

## 2019-03-29 NOTE — PATIENT INSTRUCTIONS
Follow up with your doctor if your symptoms persist/worsens, or if you develop new symptoms or side effects from the medication.    May take prescribed antibiotic if you develop fever/chills or worsening phlegmy cough.    Seek immediate medical attention if you develop worsening/severe abdominal pain, vomiting, absence of bowel movements or passage of gas.

## 2019-03-30 NOTE — ED AVS SNAPSHOT
Emergency Department  [address] [place] [address]  Phone:  [phone]  Fax:  [phone]                                    Primo Dorsey   MRN: [id number]    Department:   Emergency Department   Date of Visit:  3/30/2019           After Visit Summary Signature Page    I have received my discharge instructions, and my questions have been answered. I have discussed any challenges I see with this plan with the nurse or doctor.    ..........................................................................................................................................  Patient/Patient Representative Signature      ..........................................................................................................................................  Patient Representative Print Name and Relationship to Patient    ..................................................               ................................................  Date                                   Time    ..........................................................................................................................................  Reviewed by Signature/Title    ...................................................              ..............................................  Date                                               Time          22EPIC Rev 08/18

## 2019-03-30 NOTE — DISCHARGE INSTRUCTIONS
Continue Colace as previously prescribed    Commend MiraLAX daily and maintaining adequate hydration    Begin taking Senokot if still having issues with constipation    May consider fleets enema or Dulcolax suppository as needed for continued constipation

## 2019-03-30 NOTE — ED PROVIDER NOTES
History     Chief Complaint:  Constipation      HPI   Primo Dorsey is a 78 year old male who presents with constipation.  The patient reports dealing with constipation and abdominal discomfort recently.  He was seen here in the ED 9 days ago and had a CT scan that showed a large amount of stool impacted in the rectum as well as moderate stool in the colon.  He received 2 enemas and had a large bowel movement after which he felt significantly better.  He saw his primary care doctor 5 days ago who had him increase his Colace from one a day to 2 tablets twice a day.  He had a very small bowel movement 3 days ago but none since then therefore he went to urgent care where he was prescribed Miralax and Magnesium citrate.  He drank a bottle of Mag citrate last night and again at 0230 this morning without any production of stool.  He does have some abdominal discomfort which is the same as what he had when in the ED last time, although it is not as intense.  He has been nauseous although has not actually vomited.  He is passing gas.  He is eating and hydrating normally and his daughter has been encouraging high fiber foods and prune juice.    Allergies:  Nystatin     Medications:    Lovenox  Simvastatin   Fluconazole   Lorazepam   Levalbuterol inhaler  Albuterol inhaler   Albuterol-Ipratropium nebulizer solution  Fluticasone-salmeterol diskus inhaler     Past Medical History:    Chronic obstructive pulmonary disease   Lung mass  Chronic allergic rhinitis  Hypertension  Hyperlipidemia   Embolic stroke   Anxiety   Bilateral inguinal hernia  Degenerative joint disease, lumbar spine    Past Surgical History:    Herniorrhaphy, bilateral inguinal, umbilical 5/12/16  Tonsillectomy with adenoidectomy   Cataract surgery    Family History:    Stroke - father  Allergies - daughter  Lung cancer - brother  Stomach cancer - brother  Hypertension - sister, brother  Coronary artery disease - brother     Social History:  Presents to  the ED with his daughter, Cesilia, and son, Noman  Tobacco Use: Former smoker, 33.75 pack year history, quit 2018.   Alcohol Use: No alcohol use.   PCP: Ralph Maldonado     Review of Systems   Constitutional: Negative for chills and fever.   Respiratory: Negative for shortness of breath.    Cardiovascular: Negative for chest pain.   Gastrointestinal: Positive for abdominal pain, constipation and nausea. Negative for vomiting.   All other systems reviewed and are negative.      Physical Exam   First Vitals:  BP: 147/81  Pulse: 80  Heart Rate: 80  Temp: 97.7  F (36.5  C)  Resp: 20  Height: 182.9 cm (6')  Weight: 56.7 kg (125 lb)  SpO2: 98 %    Physical Exam  General: Alert and cooperative with exam. Patient in mild distress. Normal mentation.  Head:  Scalp is NC/AT  Eyes:  No scleral icterus, PERRL  ENT:  The external nose and ears are normal. The oropharynx is normal and without erythema; mucus membranes are moist. Uvula midline, no evidence of deep space infection.  Neck:  Normal range of motion without rigidity.  CV:  Regular rate and rhythm    No pathologic murmur   Resp:  Breath sounds are clear bilaterally    Non-labored, no retractions or accessory muscle use  GI:  Abdomen is soft with mild distension, no tenderness. No peritoneal signs  MS:  No lower extremity edema   Skin:  Warm and dry, No rash or lesions noted.  Neuro: Oriented x 3. No gross motor deficits.    Emergency Department Course   ECG:  @ 1222  Indication: irregular rhythm  Vent. Rate 87 bpm. LA interval 158. QRS duration 92. QT/QTc 362/435. P-R-T axis 83 90 77.   Normal sinus rhythm. Possible left atrial enlargement. Rightward axis. Borderline ECG.  No significant change when compared to previous ECG from 3/16/19.   Read @ 1230 by Dr. Brody.      Previous ECG from 3/16/19:  Vent. Rate 66 bpm. LA interval 164 ms. QRS duration 94 ms. QT/QTc 394/413 ms. P-R-T axis 67 91 86.   Sinus rhythm with premature atrial complexes. Rightward axis.  Borderline ECG.      Procedures:  Procedure: Fecal disimpaction    Consent: Verbal consent obtained from the patient.    Technique: Using lubrication, gentle digital rectal exam revealed hard stool within the rectal vault that was carefully and manually removed by myself.  There was significant results of hard pellet stool.    Outcome:  Patient felt much better after this procedure.     Interventions:  (1056) Pink lady enema, 286 mL, CT    Emergency Department Course:  Nursing notes and vitals reviewed.  I performed an exam of the patient as documented above.     Patient received an enema following which there was some stool production.    Disimpaction was done, soft stool in rectal vault noted.  Patient was subsequently able to have another bowel movement with improvement of his symptoms.    Findings and plan explained to the patient and family. Patient discharged home with instructions regarding supportive care, medications, and reasons to return. The importance of close follow-up was reviewed.    Impression & Plan      Medical Decision Making:  Primo Dorsey is a 78 year old male who presents for evaluation for decreased stool output without signs of serious intraabdominal problems. Signs and symptoms are consistent with ongoing constipation.  He had some stool output after enema but still felt bloated therefore rectal disimpaction was done.  There are no signs of obstruction nor other intraabdominal catastrophe.   There are no indications for advanced imaging or admission.  I am going to send them home with instructions on medication management of this.  Recommended continuing previous he prescribed Colace, daily MiraLAX, Senokot as needed, as well as fleets enema or Dulcolax suppository as needed.  Additionally recommended adequate hydration.  EKG was obtained as he was noted to have a few premature beats and demonstrates normal sinus rhythm.  Patient to follow-up closely with PCP if symptoms not improving.  Patient is  agreeable with this and questions are answered.      Diagnosis:    ICD-10-CM    1. Constipation, unspecified constipation type K59.00      Disposition:  Discharged to home.     Discharge Medications:  None       I, Lucille Dockery, am serving as a scribe on 3/30/2019 at 10:20 AM to personally document services performed by Dr. Tremayne Brody based on my observations and the provider's statements to me.     Lucille Dockery  3/30/2019    EMERGENCY DEPARTMENT       Tremayne Brody DO  03/30/19 [id number]

## 2019-04-01 NOTE — TELEPHONE ENCOUNTER
Family requesting homecare.  Face to face will need to be completed by Dr. Maldonado upon return, but order can be started now from DOD if appropriate. Pended for review, and removed Face to Face language.    See this message from Care Coordination  Patient's daughter, Cesilia, called asking if Dr. Maldonado can request Home Care evaluation for her father.  We had started discussing possibilities for the near future and they are waiting for the lung cancer biopsy result and appointment with Oncology. However, she said she had to bring him to Urgent Care on Friday and to the ED on Saturday (for constipation) and she says he is already declining.  I explained Dr. Maldonado is off today, but that I would run this by the provider that is covering for him.  Please advise.      Dinora Falcon RN

## 2019-04-01 NOTE — TELEPHONE ENCOUNTER
Called Cesilia, patient's daughter, back to let her know another provider (Jamilah Gibbs) had already put orders for Home Care.  Explained to her that the first step was taken.  She was asked to call if there are any questions or concerns.    Lily Shanks, RN Care Coordinator  [facility]  Phone:  [phone] (Mondays, Wednesdays & Fridays)  Phone:  [phone] (Tuesdays & Thursdays)

## 2019-04-01 NOTE — TELEPHONE ENCOUNTER
Patient's daughter, Cesilia, called asking if Dr. Maldonado can request Home Care evaluation for her father.  We had started discussing possibilities for the near future and they are waiting for the lung cancer biopsy result and appointment with Oncology. However, she said she had to bring him to Urgent Care on Friday and to the ED on Saturday (for constipation) and she says he is already declining.  I explained Dr. Maldonado is off today, but that I would run this by the provider that is covering for him.  Please advise.    Lily Shanks RN Care Coordinator  [facility]  Phone:  [phone] (Mondays, Wednesdays & Fridays)  Phone:  [phone] (Tuesdays & Thursdays)  Email: [email or web address]    PS: Routing this note to  Triage as well.

## 2019-04-01 NOTE — TELEPHONE ENCOUNTER
See other encounter.  Pended an encounter without face to face and routed to Dr. Maldonado's box.  Dinora Falcon RN

## 2019-04-02 NOTE — TELEPHONE ENCOUNTER
"Requested Prescriptions   Pending Prescriptions Disp Refills     ipratropium - albuterol 0.5 mg/2.5 mg/3 mL (DUONEB) 0.5-2.5 (3) MG/3ML neb solution 30 vial 0     Sig: Take 1 vial (3 mLs) by nebulization every 6 hours as needed for shortness of breath / dyspnea or wheezing    Short-Acting Beta Agonist Inhalers Protocol  Passed - 4/1/2019  5:53 PM       Passed - Patient is age 12 or older       Passed - Recent (12 mo) or future (30 days) visit within the authorizing provider's specialty    Patient had office visit in the last 12 months or has a visit in the next 30 days with authorizing provider or within the authorizing provider's specialty.  See \"Patient Info\" tab in inbasket, or \"Choose Columns\" in Meds & Orders section of the refill encounter.             Passed - Medication is active on med list        Last Written Prescription Date:  3/09/19  Last Fill Quantity: 30 vial,  # refills: 0   Last office visit: 3/25/2019 with prescribing provider:  Joel   Future Office Visit:      "

## 2019-04-02 NOTE — PROGRESS NOTES
Clinic Care Coordination Contact  Care Coordination Communication    Referral Source: Self-patient/Caregiver: patient's daughterCesilia.    Clinical Data: Patient was hospitalized at [facility] from 3/16/19 to 3/18/19 for speech difficulties evaluation. He was then diagnosed with transient aphasia, secondary to embolic acute and subacute infarcts. Incidentally, patient was suspected to have advanced lung cancer. That was confirmed.   Patient has already followed up with PCP, but is awaiting for contact from [place] [facility] for appointment to discuss treatment, pending biopsy results.   Per patient's daughter, Primo's health and mobility have started to decline. Home Care orders were placed, per her request.    Home Care Contact:  Home Care Agency: [facility]   and phone number: not available yet  Care Coordination contacted home care: Yes  Anticipated start of care date: 4/4/19 (evaluation)    Patient Contact:   Home care has contacted patient: Yes    Plan: RN Care Coordinator will outreach to patient in a week and home care every 4 weeks.      Lily Shanks RN Care Coordinator  [facility]  Phone:  [phone] (Mondays, Wednesdays & Fridays)  Phone:  [phone] (Tuesdays & Thursdays)  Email: [email or web address]

## 2019-04-02 NOTE — TELEPHONE ENCOUNTER
LORazepam (ATIVAN) 0.5 MG tablet  Last Written Prescription Date:  3/12/19  Last Fill Quantity: 30,  # refills: 0   Last office visit: 3/25/2019 with prescribing provider:  Joel    Future Office Visit:      enoxaparin (LOVENOX) 100 MG/ML syringe  Last Written Prescription Date:  3/19/19  Last Fill Quantity: 30,  # refills: 0   Last office visit: 3/25/2019 with prescribing provider:  Joel    Future Office Visit:        ULTICARE ALCOHOL SWABS 70 % PADS   Last Written Prescription Date:  Not on med list  Last Fill Quantity: ,  # refills:    Last office visit: 3/25/2019 with prescribing provider:     Future Office Visit:        Requested Prescriptions   Pending Prescriptions Disp Refills     ULTICARE ALCOHOL SWABS 70 % PADS [Pharmacy Med Name: ULTICARE ALCOHOL SWABS 70% PADS] 100 each 0     Sig: USE WITH LOVENOX INJECTIONS    There is no refill protocol information for this order        enoxaparin (LOVENOX) 100 MG/ML syringe [Pharmacy Med Name: ENOXAPARIN SODIUM 100MG/ML SOLN] 30 mL 0     Sig: INJECT 0.9ML (90MG) UNDER THE SKIN ONCE DAILY; FUTURE REFILLS BY DR LEANNE PONCE [phone]    There is no refill protocol information for this order        LORazepam (ATIVAN) 0.5 MG tablet 30 tablet 0     Sig: Take 1 tablet (0.5 mg) by mouth At Bedtime    There is no refill protocol information for this order

## 2019-04-02 NOTE — PROGRESS NOTES
Clinic Care Coordination Contact  OUTREACH    Referral Information: Caregiver: patient's daughterCesilia.    Primary Diagnosis: Oncology - Lung Cancer  Chief Complaint   Patient presents with     Clinic Care Coordination - Follow-up     Care Coordination      Universal Utilization: Urgent Care visit and last ED visit were for same reason. Care Coordination being utilized in attempt to guide patient to right resources for optimal outcome.  Clinic Utilization: Appropriate    Clinical Concerns:  Current Medical Concerns:  Per patient's daughter, patient's pain level is increasing and, proportionally, his level of activity is decreasing. He is more tired and needs more assistance with daily living activities. Despite visit to Urgent Care and Emergency Department, patient is still concerned about constipation and, therefore, becomes more anxious.      Current Behavioral Concerns: Patient, and daughter, are showing more signs of anxiety secondary to cancer diagnosis, pain management and possible debility related to pain and/or lung cancer progression.    Education Provided to patient: Home Care services scope of practice; reasons for increase in constipations and measures to reduce it.   Pain  Pain (GOAL):: Yes  Type: Acute (<3mo)  Progression: Worsening  Limitation of routine activities due to chronic pain: Yes  Description: Unable to perform most daily activities   Alleviating Factors: Rest.   Aggravating Factors: Activity    Medication Management:  Current pain medication regimen is no longer adequate, per patient's report to his daughter.    Functional Status:   Patient is depending more on his daughter as his pain level has increased and stamina has decreased in the past week.    Living Situation:  Patient lives alone. Daughter Cesilia, who lives in [place], is currently staying with the patient.    Diet:  Diet:: Low saturated fat, No added salt  Inadequate nutrition (GOAL):: Yes. Patient's appetite is  decreasing.    Psychosocial:  Mental health DX:: anxiety  Mental health DX how managed:: Medication, [facility] Services at Primary Care  Informal Support system:: Children     Financial/Insurance: [facility] MEDICARE ADVANTAGE     Resources:  Current Resources: None  Supplies used at home:: Oxygen Tubing/Supplies, Nebulizer tubing  Equipment Currently Used at Home: none  Advance Care Plan/Directive  Advanced Care Plans/Directives on file:: Yes  Type Advanced Care Plans/Directives: Advanced Directive - On File  Advanced Care Plan/Directive Status: Not Applicable    Interventions:   Referrals Placed: Home Care Services.     Goals:   Goals        General    Ability to hear well (pt-stated)     Notes - Note edited  4/2/2019 12:00 PM by Lily Shanks, RN    Goal Statement: Patient is hard of hearing and both him and his family want his hearing capacity to improve.  Measure of Success:    Patient will not feel stressed out for not being able to communicate with others over the phone.    Patient will answer the phone right away, instead of trying to understand voice messages.    Patient will be able to talk on the phone and understand what the speaker is communicating.  Supportive Steps to Achieve:     RN Care Coordinator will provide information to patient and family about hearing aides/devices.    Patient will purchase hearing aide/device to assist him with his loss of hearing.  Barriers: Patient has multiple new diagnoses to cope with.   Strengths: Patient has health insurance and supportive family.  Date to Achieve By: 6/26/19  Patient expressed understanding of goal: Yes.      Healthy Eating (pt-stated)     Notes - Note edited  4/2/2019 12:00 PM by Lily Shanks, RN    Goal Statement: Patient and his daughter want to find meals that he has an appetite for, so he does not lose more weight.  Measure of Success:     Patient will not lose weight in the next 3 months.    Patient will maintain current weight or higher for the  next 6 months.     Patient will be able to have good meals delivered, if he is not able to cook for himself.  Supportive Steps to Achieve: RN Care Coordinator will assist patient with resources.  Barriers:     Patient lives on his own.     Daughter lives in [place] and is staying until they have a better grasp of his health condition.    Son lives in [place], but availability is limited.  Strengths: Good family support system.  Date to Achieve By: 6/26/19  Date to Sustain until: 9/26/19  Patient expressed understanding of goal: Yes        Pain Management (pt-stated)     Notes - Note edited  4/2/2019 12:00 PM by Lily Shanks RN    Goal Statement: Patient wants to have pain under control within the next few days and maintain it after it's achieved.  Measure of Success: Patient will state his pain is under control and/or manageable.  Supportive Steps to Achieve:     Patient will voice his pain to his daughter Cesilia, while she is staying with him.    Patient will communicate with the Care Team if his pain increases.    RN Care Coordinator will contact [facility] and PCP to address pain management for patient.    RN Care Coordinator will contact [facility] to confirm evaluation visit to patient's home.  Barriers:     Patient is hard-of-hearing and it is difficult to talk to him over the phone.    Anxiety caused by recent diagnosis of lung cancer.  Strengths: Patient has a supportive family and a daughter that advocates for him.  Date to Achieve By: 4/3/19  Date to Maintain through: 7/3/19  Patient expressed understanding of goal: Yes.      Psychosocial (pt-stated)     Notes - Note edited  4/2/2019 12:00 PM by Lily Shanks RN    Goal Statement: Patient and his family want to find alternatives for his health care and living condition, as he has new cancer diagnosis.  Measure of Success: Patient will:     feel comfortable to choose alternatives to keep his independence.    have access to transportation to go to  "appointments/treatment in case he can not drive himself or family is not available.  Supportive Steps to Achieve:    RN Care Coordinator will provide resources to patient.    Patient and his family will voice their likes/dislikes to guide search for resources.  Barriers: Patient has just retired and is used to being independent.  Strengths: Good family support system.  Date to Achieve By: 7/26/19  Patient expressed understanding of goal: Yes.          As of today's date, \"Ability to hear well\" goal is met at 0 - 25%.   Goal Status: On hold, until pain management is taken care.    As of today's date, \"Healthy eating\" goal is met at 26 - 50%.   Goal Status:  Ongoing    As of today's date, \"Pain Management\" goal is met at 0 - 25%.   Goal Status:  Active. [facility] was contacted for update on follow-up with patient. If that is not happening in a day or two, writer will recommend that patient seeks care at the ED.    As of today's date, \"Psychosocial\" goal is met at 51 - 75%.   Goal Status:  Showing progress. Patient's daughter reached out to RN Care Coordinator to request Home Care Services to be started. RN CC contacted PCP and orders were placed yesterday. RN CC contacted [facility] Liaison and was informed patient will be evaluated for services within 2 days. RN CC contacted patient's daughter to give her an update. She informed writer that a Home Care RN has called and evaluation will take place on 4/4/19.    Patient/Caregiver understanding: Yes.    Outreach Frequency: weekly    Plan: RN Care Coordinator will update patient after receiving call from [facility].    Lily Shanks, RN Care Coordinator  [facility]  Phone:  [phone] (Mondays, Wednesdays & Fridays)  Phone:  [phone] (Tuesdays & Thursdays)  Email: [email or web address]        "

## 2019-04-02 NOTE — TELEPHONE ENCOUNTER
Routing refill request to provider for review/approval because:  Med was rxd in UC with no refills.  Please authorize if appropriate.  Thanks,  Stephie Hamilton RN

## 2019-04-02 NOTE — TELEPHONE ENCOUNTER
Second request from pharmacy for duoneb.  Marked Urgent.    Other medications requested are in a different refill encounter from today 4/2/19.    Crystal Reaves RT (R)

## 2019-04-03 NOTE — TELEPHONE ENCOUNTER
Cesilia Ware, daughter, indicated that they are going to run out of the LORazepam (ATIVAN) 0.5 MG tablet this weekend and need a refill. Just verifying this is sent to pharma    Cesilia's Ph: [phone]

## 2019-04-04 NOTE — TELEPHONE ENCOUNTER
Son/Hiren came in wondering about the LORazepam (ATIVAN) 0.5 MG tablet    Please have this sent over to      [facility], [place] - [address] [facility]    Please call Hiren At [phone]    Thank you

## 2019-04-04 NOTE — TELEPHONE ENCOUNTER
Reason for Call:  Home Health Care    Deanna with [facility] called regarding (reason for call): Orders ASAP    Orders are needed for this patient. PT, OT, Skilled Nursing    PT: Eval x 1    OT: Eval x 1    Home Health Aide: 1 x week for 3 weeks     : Carrieal x 1    Skilled Nursin x week for 1 week, 2 x week for 3 weeks, 5 PRN's    Pt Provider:     Phone Number Homecare Nurse can be reached at: [phone]    Can we leave a detailed message on this number? YES        Call taken on 2019 at 3:33 PM by Vladimir Cardoso

## 2019-04-04 NOTE — TELEPHONE ENCOUNTER
Verbal approval given per request below.Homecare/hospice agency to fax orders for provider signature.     Vale VALENTE RN

## 2019-04-04 NOTE — TELEPHONE ENCOUNTER
Jamilah-    Per Dr. Maldonado's OV notes on 3/25:     Chronic obstructive pulmonary disease, unspecified COPD type (H)  Being aggravated by his anxiety associated with his lung cancer; encouraged him to do some minor activities regularly to improve his endurance and as a stress release valve -- increasing the lorazepam to be able to take bid prn     Pended RX for 60 tabs, Sig: Take 1 tablet my mouth two times daily as needed for anxiety

## 2019-04-05 NOTE — TELEPHONE ENCOUNTER
Reason for Call:  Home Health Care    Laxmi with [facility] called regarding (reason for call): needs a verbal order     She doesn't have fax#    Orders are needed for this patient.     PT: Twice a week for 3 weeks for excercising    OT:     Skilled Nursing:     Pt Provider:     Phone Number Homecare Nurse can be reached at: [phone]    Can we leave a detailed message on this number? YES    Phone number patient can be reached at: Cell number on file:    Telephone Information:   Mobile [phone]       Best Time: any    Call taken on 4/5/2019 at 4:47 PM by Yunior Limon

## 2019-04-05 NOTE — TELEPHONE ENCOUNTER
Called Eric with [facility] to provide verbal orders for PT as requested- no answer, left  also providing fax number    Lobito TYSON RN

## 2019-04-08 NOTE — PROGRESS NOTES
HPI      Problem 1: GI problem    Character - constipation  Intensity - severe  Timing/Onset - 1 week  Aggravating Factors - none  Alleviating Factors - took Colace without much relief  Associated Symptoms - see ROS  **Still passing flatus      Problem 2: URI    Character - non-productive cough  Intensity - mild  Timing/Onset - started a few days ago  Aggravating Factors - none  Alleviating Factors - none  Associated Symptoms - see ROS      Past Medical History:   Diagnosis Date     COPD (chronic obstructive pulmonary disease) (H)      Hypertension        Review of Systems   Constitutional: Negative for chills, fever and malaise/fatigue.   HENT: Positive for congestion. Negative for sinus pain and sore throat.    Respiratory: Positive for cough. Negative for sputum production and shortness of breath.    Cardiovascular: Negative for chest pain.   Gastrointestinal: Positive for abdominal pain (Mild generalized) and constipation. Negative for blood in stool, melena, nausea and vomiting.       /84   Pulse 74   Temp 97.1  F (36.2  C) (Oral)   SpO2 97%       Physical Exam   Constitutional: He is oriented to person, place, and time. No distress.   Pulmonary/Chest: Effort normal and breath sounds normal. No respiratory distress.   Abdominal: Soft. Bowel sounds are normal. He exhibits no distension. There is tenderness (Mild generalized). There is no rebound and no guarding.   Neurological: He is alert and oriented to person, place, and time.   Skin: No rash noted.   Vitals reviewed.        ICD-10-CM    1. Constipation, unspecified constipation type K59.00 magnesium citrate solution   2. Acute bronchitis, unspecified organism J20.9 azithromycin (ZITHROMAX) 250 MG tablet       Patient Instructions   Follow up with your doctor if your symptoms persist/worsens, or if you develop new symptoms or side effects from the medication.    May take prescribed antibiotic if you develop fever/chills or worsening phlegmy  cough.    Seek immediate medical attention if you develop worsening/severe abdominal pain, vomiting, absence of bowel movements or passage of gas.

## 2019-04-08 NOTE — TELEPHONE ENCOUNTER
MAX 30 QUANTITY IN 54 DAYS      Please do not close this encounter until this has been addressed.  (prior auth approved/denied, prescriber refusal to complete prior auth or medication changed/discontinued)    Prior Authorization needed on: Lovenox 100mg/ml  Drug NDC: [address]-10     Insurance: [facility] Part D  Member ID: [id number]    Insurance phone #: [phone]    Pharmacy NPI: [id number]  Pharmacy Phone #: [phone]   Pharmacy Fax #: [phone]    Please let us know if the PA gets approved or denied or if medication is changed

## 2019-04-09 NOTE — TELEPHONE ENCOUNTER
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 Patient will be on medication indefinitely due to his history.    Insurance Name:  [facility]  Insurance ID:  [id number]      Pharmacy Information (if different than what is on RX)  Name:  [facility]  Phone:  [phone]

## 2019-04-09 NOTE — TELEPHONE ENCOUNTER
Reason for Call:  Home Health Care    Erinn with [facility] called regarding (reason for call):     Orders are needed for this patient.    Erinn as a consult with patient at 1 pm today about possibly going in to hospice care, and is looking for verbal authorization that Dr Maldonado will continue to follow the patient while in hospice care.     Pt Provider: Joel    Phone Number Homecare Nurse can be reached at: [phone]    Can we leave a detailed message on this number? YES     Call taken on 4/9/2019 at 12:20 PM by Brenda Thomas

## 2019-04-09 NOTE — TELEPHONE ENCOUNTER
Called Brenda with [facility] to provide verbal orders that PCP will follow during hospice.    Lobito TYSON RN

## 2019-04-10 NOTE — PROGRESS NOTES
Clinic Care Coordination Contact  Chart Review    Referral Information:  Referral Source: Self-patient/Caregiver (Patient's daughter, Cesilia.)  Primary Diagnosis: Oncology    RN Care Coordinator contacted patient's daughter Cesilia and engaged in AIDET communication during the encounter. Patient is very hard-of-hearing and consent to communicate with Cesilia, from 4/6/16, is on file. She stated that Primo has had an appointment with Oncology and chemotherapy was cancelled, as the patient had to start taking antibiotics for pneumonia. Cesilia stated her father's pain is better today (3/10) than when he had the Oncology appointment, in which it was 8/10.  She is planning to go back to [place] for a week. Her brother and Hospice Care will tend to her father while she is gone.    Chief Complaint   Patient presents with     Clinic Care Coordination - Follow-up     Chart review/reassessment of goals     Clinical Concerns: Patient was hospitalized at [facility] from 3/16/19 to 3/18/19 for speech difficulties evaluation. He was then diagnosed with transient aphasia, secondary to embolic acute and subacute infarcts. Incidentally, patient was suspected to have advanced lung cancer. That was confirmed.     Current Behavioral Concerns: None at this time.       Pain  Pain (GOAL):: Yes    Medication Management:  Done by patient's children and Hospice Care.     Living Situation:  Patient lives alone, but daughter, who lives in [place], is staying with him.     Psychosocial:  Evangelical or spiritual beliefs that impact treatment:: No  Mental health DX:: Yes(anxiety)  Mental health DX how managed:: Medication, C Services at Primary Care  Mental health management concern (GOAL):: No  Informal Support system:: Children     Financial/Insurance concerns:: No ([employer] MEDICARE ADVANTAGE)     Resources and Interventions:  Current Resources: Hospice Care  Home Care Agency: [facility]  : Yadira Gray RN, [phone]  Care Coordination  contacted home care: Yes  Start of hospice care date: 4/9/19 (patient had Home Care services from 4/4/19 until 4/9/19).  List of home care services:: Skilled Nursing, Home Health Aid ([facility],  visited with patient);     Supplies used at home:: Oxygen Tubing/Supplies, Nebulizer tubing    Advanced Care Plans/Directives on file:: Yes  Type Advanced Care Plans/Directives: Advanced Directive - On File  Advanced Care Plan/Directive Status: Not Applicable    RN Care Coordinator offered to assist patient's daughter with resources for added private patient care, when she mentioned possible future need for that. RN CC instructed her to reach out to Hospice SW as well.     Goals:   Goals        General    Ability to hear well (pt-stated)     Notes - Note edited  4/10/2019 11:28 AM by Lily Shanks RN    Goal Statement: Patient is hard of hearing and both him and his family want his hearing capacity to improve.  Measure of Success:    Patient will not feel stressed out for not being able to communicate with others over the phone.    Patient will answer the phone right away, instead of trying to understand voice messages.    Patient will be able to talk on the phone and understand what the speaker is communicating.  Supportive Steps to Achieve:     RN Care Coordinator will provide information to patient and family about hearing aides/devices.    Patient will purchase hearing aide/device to assist him with his loss of hearing.  Barriers: Patient has multiple new diagnoses to cope with.   Strengths: Patient has health insurance and supportive family.  Date to Achieve By: 6/26/19  Patient expressed understanding of goal: Yes.    As of today's date 4/10/2019 goal is met at 0 - 25%.   Goal Status:  Discontinued, as this is not a priority for patient at this time.        Healthy Eating (pt-stated)     Notes - Note edited  4/10/2019 11:27 AM by Lily Shanks, RN    Goal Statement: Patient and his daughter  want to find meals that he has an appetite for, so he does not lose more weight.  Measure of Success:     Patient will not lose weight in the next 3 months.    Patient will maintain current weight or higher for the next 6 months.     Patient will be able to have good meals delivered, if he is not able to cook for himself.  Supportive Steps to Achieve: RN Care Coordinator will assist patient with resources.  Barriers:     Patient lives on his own.     Daughter lives in [place] and is staying until they have a better grasp of his health condition.    Son lives in [place], but availability is limited.  Strengths: Good family support system.  Date to Achieve By: 6/26/19  Date to Sustain until: 9/26/19  Patient expressed understanding of goal: Yes    As of today's date 4/10/2019 goal status is being discontinued, as patient is now transitioning to Hospice Care.          Pain Management (pt-stated)     Notes - Note edited  4/2/2019 12:00 PM by Lily Shanks RN    Goal Statement: Patient wants to have pain under control within the next few days and maintain it after it's achieved.  Measure of Success: Patient will state his pain is under control and/or manageable.  Supportive Steps to Achieve:     Patient will voice his pain to his daughter Cesilia, while she is staying with him.    Patient will communicate with the Care Team if his pain increases.    RN Care Coordinator will contact [facility] and PCP to address pain management for patient.    RN Care Coordinator will contact [facility] to confirm evaluation visit to patient's home.  Barriers:     Patient is hard-of-hearing and it is difficult to talk to him over the phone.    Anxiety caused by recent diagnosis of lung cancer.  Strengths: Patient has a supportive family and a daughter that advocates for him.  Date to Achieve By: 4/3/19  Date to Maintain through: 7/3/19  Patient expressed understanding of goal: Yes.      Psychosocial (pt-stated)     Notes - Note edited   4/10/2019 11:27 AM by Lily Shanks RN    Goal Statement: Patient and his family want to find alternatives for his health care and living condition, as he has new cancer diagnosis.  Measure of Success: Patient will:     feel comfortable to choose alternatives to keep his independence.    have access to transportation to go to appointments/treatment in case he can not drive himself or family is not available.  Supportive Steps to Achieve:    RN Care Coordinator will provide resources to patient.    Patient and his family will voice their likes/dislikes to guide search for resources.  Barriers: Patient has just retired and is used to being independent.  Strengths: Good family support system.  Date to Achieve By: 7/26/19  Patient expressed understanding of goal: Yes.    As of today's date 4/10/2019 goal is met at 76 - 100%.   Goal Status:  Complete            As of today's date, pain goal is met at 26 - 50%.   Goal Status:  Showing progress.    Plan: RN Care Coordinator will await notification from Hospice Care staff informing about patient's discharge plans/needs. RN Care Coordinator will review patient's chart every 2 weeks, as needed, and/or outreach to Home Care staff.    Lily Shanks RN Care Coordinator  [facility]  Phone:  [phone] (Mondays, Wednesdays & Fridays)  Phone:  [phone] (Tuesdays & Thursdays)  Email: [email or web address]

## 2019-04-12 NOTE — TELEPHONE ENCOUNTER
"Returned call to Judith with [facility].      She will fax Home Care orders to Dr Maldonado to be completed using notes/information from 3-25-19 OV with Dr Maldonado.      Dr Maldonado---if that visit is not appropriate for Home Care \"face to face\" guidelines, then patient will need appointment with you to fulfill that requirement..ASAP.    Radha CORDOVA RN,BSN        "

## 2019-04-12 NOTE — TELEPHONE ENCOUNTER
Judith from  home care and hospice called and wanted a call back to confirm that the face to face was competed and the date that this was competed. She can be reached at [phone] and VM okay.  If this has not been done she needs to know if the homecare face to face form needs to be sent out.

## 2019-04-16 NOTE — TELEPHONE ENCOUNTER
For face to face, pt needs a visit with dr Maldonado sometime within 30 days of SOC.  When call was taken on 4/1/19, discussion that needed face to face complete.  If pt was homebound on 3/25 may suffice, also left message for daughter to call back to clinic.  Clinic to offer F/U visit.     Dr. Maldonado, Please review pended.   Dinora Falcon RN

## 2019-04-16 NOTE — TELEPHONE ENCOUNTER
Central Prior Authorization Team   Phone: [phone]      PA Initiation    Medication: Lovenox  Insurance Company: [facility] - Phone [phone] Fax [phone]  Pharmacy Filling the Rx: [facility] - [address] [facility]  Filling Pharmacy Phone: [phone]  Filling Pharmacy Fax:    Start Date: 4/16/2019

## 2019-04-16 NOTE — TELEPHONE ENCOUNTER
Prior Authorization Approval    Authorization Effective Date: 1/16/2019  Authorization Expiration Date: 4/16/2020  Medication: Lovenox  Approved Dose/Quantity:    Reference #: [id number]   Insurance Company: [employer] [place] - Phone [phone] Fax [phone]  Expected CoPay:       CoPay Card Available:      Foundation Assistance Needed:    Which Pharmacy is filling the prescription (Not needed for infusion/clinic administered): [facility] [place], [place] - [address] [address]  Pharmacy Notified: Yes  Patient Notified: Yes

## 2019-04-17 NOTE — TELEPHONE ENCOUNTER
Cesilia, pt's daughter returning a call, states that patient is under hospice care and is unable to come in for an OV    Ph for Cesilia: [phone] VM is okay

## 2019-04-17 NOTE — TELEPHONE ENCOUNTER
[facility] called looking for the face-to-face OV notes prior to home care visits from possibly 3.25.19. Pt is homebound    Fax: [phone]  Ph: [phone]

## 2019-04-25 NOTE — PROGRESS NOTES
Clinic Care Coordination Contact    Situation: Patient chart reviewed by care coordinator.    Background: Patient was hospitalized at [facility] from 3/16/19 to 3/18/19 for speech difficulties evaluation. He was then diagnosed with transient aphasia, secondary to embolic acute and subacute infarcts. Incidentally, a diagnosis of advanced lung cancer was confirmed. Soon after, Natalies health and mobility started to decline. Home Care was started, per patient's family request, on 4/4/19. On 4/9/19, patient transitioned to Hospice Care.    Assessment: Patient is receiving Hospice Care services by [facility] and pain is being well managed.    List of home care services:: Skilled Nursing, Home Health Aid (Spiritual Care, Massage Therapist, .);   Supplies used at home:: Oxygen Tubing/Supplies, Nebulizer tubing  Equipment Currently Used at Home: other (oxygen tank/portable)    Advance Care Plan/Directive  Advanced Care Plans/Directives on file:: Yes  Type Advanced Care Plans/Directives: Advanced Directive - On File, POLST  Advanced Care Plan/Directive Status: Not Applicable    Goals:   Goals        General    Pain Management (pt-stated)     Notes - Note edited  4/2/2019 12:00 PM by Lily Shanks RN    Goal Statement: Patient wants to have pain under control within the next few days and maintain it after it's achieved.  Measure of Success: Patient will state his pain is under control and/or manageable.  Supportive Steps to Achieve:     Patient will voice his pain to his daughter Cesilia, while she is staying with him.    Patient will communicate with the Care Team if his pain increases.    RN Care Coordinator will contact [facility] and PCP to address pain management for patient.    RN Care Coordinator will contact [facility] to confirm evaluation visit to patient's home.  Barriers:     Patient is hard-of-hearing and it is difficult to talk to him over the phone.    Anxiety caused by recent diagnosis of  lung cancer.  Strengths: Patient has a supportive family and a daughter that advocates for him.  Date to Achieve By: 4/3/19  Date to Maintain through: 7/3/19  Patient expressed understanding of goal: Yes.          Per chart review, as of today's date 4/25/2019 goal is met at 51 - 75%.   Goal Status:  Ongoing    Outreach Frequency: 2 weeks      Plan: RN Care Coordinator will await notification from Hospice Care staff informing about patient's discharge plans/needs. RN Care Coordinator will review patient's chart in 2 weeks and/or outreach to Home Care staff.    Lily Shanks RN Care Coordinator  [facility]  Phone:  [phone] (Mondays, Wednesdays & Fridays)  Phone:  [phone] (Tuesdays & Thursdays)  Email: [email or web address]      Addendum - 5/8/2019    Per chart review, patient has passed away on 4/25/19.  This writer will rectify all necessary data and close care coordination accordingly.    Lily Shanks RN Care Coordinator

## 2019-04-26 ENCOUNTER — TELEPHONE (OUTPATIENT)
Dept: FAMILY MEDICINE | Facility: CLINIC | Age: 79
End: 2019-04-26

## 2019-04-26 NOTE — TELEPHONE ENCOUNTER
PCP see below, Hospice asking if 3/25/19 OV notes be addended for face-to-face?     Please advise   Yareli HOLLOWAY RN

## 2019-04-26 NOTE — TELEPHONE ENCOUNTER
Reason for Call:  Other call back    Detailed comments: Judith from [facility] is calling, per notes from 4/9 it was indicated patient needed an OV for face to face visit but patient is in hospice care.  Judith wants to clarify that the 3/25 visit can be used as face to face visit, she said it had been removed as the face to face visit for home care orders.     Phone Number Patient can be reached at: Other phone number:  [phone]    Best Time: any, before 330    Can we leave a detailed message on this number? YES    Call taken on 4/26/2019 at 9:56 AM by Brenda Thomas

## 2019-06-17 PROBLEM — J30.2 CHRONIC SEASONAL ALLERGIC RHINITIS: Status: ACTIVE | Noted: 2017-07-19

## 2020-06-03 NOTE — TELEPHONE ENCOUNTER
Patients daughter Cesilia called and also asked for     LORazepam (ATIVAN) 0.5 MG tablet     [facility] [place], [place] - [address] [place] [address]  Cesilia can be reached at [phone]    She asked that we send the Nebulizer to    [id number] - [place], [place] - [address] [place] [address]     Airborne+Contact precautions

## (undated) RX ORDER — FLUMAZENIL 0.1 MG/ML
INJECTION, SOLUTION INTRAVENOUS
Status: DISPENSED
Start: 2019-01-01

## (undated) RX ORDER — FENTANYL CITRATE 50 UG/ML
INJECTION, SOLUTION INTRAMUSCULAR; INTRAVENOUS
Status: DISPENSED
Start: 2019-01-01

## (undated) RX ORDER — NALOXONE HYDROCHLORIDE 0.4 MG/ML
INJECTION, SOLUTION INTRAMUSCULAR; INTRAVENOUS; SUBCUTANEOUS
Status: DISPENSED
Start: 2019-01-01

## (undated) RX ORDER — LIDOCAINE HYDROCHLORIDE 40 MG/ML
SOLUTION TOPICAL
Status: DISPENSED
Start: 2019-01-01

## (undated) RX ORDER — GLYCOPYRROLATE 0.2 MG/ML
INJECTION, SOLUTION INTRAMUSCULAR; INTRAVENOUS
Status: DISPENSED
Start: 2019-01-01